# Patient Record
Sex: FEMALE | Race: WHITE | NOT HISPANIC OR LATINO | Employment: OTHER | URBAN - METROPOLITAN AREA
[De-identification: names, ages, dates, MRNs, and addresses within clinical notes are randomized per-mention and may not be internally consistent; named-entity substitution may affect disease eponyms.]

---

## 2023-09-04 PROBLEM — F41.9 ANXIETY: Status: ACTIVE | Noted: 2020-07-31

## 2023-09-04 PROBLEM — M54.32 LEFT SIDED SCIATICA: Status: ACTIVE | Noted: 2019-05-08

## 2023-09-04 PROBLEM — M48.02 FORAMINAL STENOSIS OF CERVICAL REGION: Status: ACTIVE | Noted: 2023-09-04

## 2023-09-04 PROBLEM — K80.10 CHOLELITHIASIS AND CHOLECYSTITIS WITHOUT OBSTRUCTION: Status: ACTIVE | Noted: 2023-09-04

## 2023-09-04 PROBLEM — R31.9 HEMATURIA: Status: ACTIVE | Noted: 2020-01-17

## 2023-09-04 PROBLEM — E66.9 OBESITY: Status: ACTIVE | Noted: 2019-07-23

## 2023-09-04 PROBLEM — R10.11 ABDOMINAL PAIN, RIGHT UPPER QUADRANT: Status: ACTIVE | Noted: 2019-11-22

## 2023-09-04 PROBLEM — R82.90 ABNORMAL URINALYSIS: Status: ACTIVE | Noted: 2020-11-24

## 2023-09-04 PROBLEM — K80.20 GALL STONE: Status: ACTIVE | Noted: 2019-11-27

## 2023-09-04 PROBLEM — E78.5 HLD (HYPERLIPIDEMIA): Status: ACTIVE | Noted: 2022-10-19

## 2023-09-04 PROBLEM — R82.90 MALODOROUS URINE: Status: ACTIVE | Noted: 2020-01-17

## 2023-09-04 PROBLEM — K52.9 GASTROENTERITIS: Status: ACTIVE | Noted: 2023-09-04

## 2023-09-04 PROBLEM — G56.20 CUBITAL TUNNEL SYNDROME: Status: ACTIVE | Noted: 2019-06-14

## 2023-09-04 PROBLEM — Z90.49 S/P CHOLECYSTECTOMY: Status: ACTIVE | Noted: 2020-01-17

## 2023-09-04 PROBLEM — R30.0 DYSURIA: Status: ACTIVE | Noted: 2018-05-07

## 2023-09-04 PROBLEM — E11.9 DIABETES MELLITUS (MULTI): Status: ACTIVE | Noted: 2020-04-24

## 2023-09-04 PROBLEM — G95.20 CERVICAL SPINAL CORD COMPRESSION (MULTI): Status: ACTIVE | Noted: 2023-09-04

## 2023-09-04 PROBLEM — R20.0 NUMBNESS: Status: ACTIVE | Noted: 2023-09-04

## 2023-09-04 PROBLEM — R47.81 SLURRED SPEECH: Status: ACTIVE | Noted: 2020-07-06

## 2023-09-04 PROBLEM — Z98.1 STATUS POST CERVICAL SPINAL FUSION: Status: ACTIVE | Noted: 2023-09-04

## 2023-09-04 RX ORDER — OMEPRAZOLE 20 MG/1
1 CAPSULE, DELAYED RELEASE ORAL DAILY
COMMUNITY
Start: 2023-02-28 | End: 2024-05-10 | Stop reason: SDUPTHER

## 2023-09-04 RX ORDER — MAGNESIUM GLUCONATE 27.5 (500)
TABLET ORAL
COMMUNITY
End: 2023-12-19

## 2023-09-04 RX ORDER — NAPROXEN SODIUM 220 MG
TABLET ORAL
COMMUNITY

## 2023-09-04 RX ORDER — MECLIZINE HCL 12.5 MG 12.5 MG/1
TABLET ORAL
COMMUNITY
Start: 2019-07-11 | End: 2023-12-19

## 2023-09-04 RX ORDER — IBUPROFEN 400 MG/1
TABLET ORAL
COMMUNITY
End: 2024-06-05

## 2023-09-04 RX ORDER — DEXTROMETHORPHAN HYDROBROMIDE, GUAIFENESIN 5; 100 MG/5ML; MG/5ML
1300 LIQUID ORAL
COMMUNITY
Start: 2023-02-28

## 2023-09-04 RX ORDER — METFORMIN HYDROCHLORIDE 500 MG/1
1 TABLET ORAL 2 TIMES DAILY
COMMUNITY
Start: 2023-02-28 | End: 2023-12-19

## 2023-09-04 RX ORDER — BLOOD-GLUCOSE METER
KIT MISCELLANEOUS DAILY
COMMUNITY
Start: 2019-03-22

## 2023-09-17 ENCOUNTER — HOSPITAL ENCOUNTER (OUTPATIENT)
Dept: DATA CONVERSION | Facility: HOSPITAL | Age: 73
Discharge: HOME | End: 2023-09-17
Payer: MEDICARE

## 2023-09-17 DIAGNOSIS — R53.83 OTHER FATIGUE: ICD-10-CM

## 2023-09-17 DIAGNOSIS — R42 DIZZINESS AND GIDDINESS: ICD-10-CM

## 2023-09-17 DIAGNOSIS — I10 ESSENTIAL (PRIMARY) HYPERTENSION: ICD-10-CM

## 2023-09-17 DIAGNOSIS — Z20.822 CONTACT WITH AND (SUSPECTED) EXPOSURE TO COVID-19: ICD-10-CM

## 2023-09-17 DIAGNOSIS — Z88.2 ALLERGY STATUS TO SULFONAMIDES: ICD-10-CM

## 2023-09-17 DIAGNOSIS — Z88.0 ALLERGY STATUS TO PENICILLIN: ICD-10-CM

## 2023-09-17 DIAGNOSIS — Z79.84 LONG TERM (CURRENT) USE OF ORAL HYPOGLYCEMIC DRUGS: ICD-10-CM

## 2023-09-17 DIAGNOSIS — R53.1 WEAKNESS: ICD-10-CM

## 2023-09-17 DIAGNOSIS — Z79.899 OTHER LONG TERM (CURRENT) DRUG THERAPY: ICD-10-CM

## 2023-09-17 LAB
ALBUMIN SERPL-MCNC: 4.3 GM/DL (ref 3.5–5)
ALBUMIN/GLOB SERPL: 1.6 RATIO (ref 1.5–3)
ALP BLD-CCNC: 118 U/L (ref 35–125)
ALT SERPL-CCNC: 13 U/L (ref 5–40)
AMPHETAMINES UR QL SCN>1000 NG/ML: NEGATIVE
ANION GAP SERPL CALCULATED.3IONS-SCNC: 12 MMOL/L (ref 0–19)
AST SERPL-CCNC: 17 U/L (ref 5–40)
BACTERIA SPEC CULT: NORMAL
BACTERIA UR QL AUTO: NEGATIVE
BARBITURATES UR QL SCN>300 NG/ML: NEGATIVE
BASOPHILS # BLD AUTO: 0.02 K/UL (ref 0–0.22)
BASOPHILS NFR BLD AUTO: 0.3 % (ref 0–1)
BENZODIAZ UR QL SCN>300 NG/ML: NEGATIVE
BILIRUB SERPL-MCNC: 0.4 MG/DL (ref 0.1–1.2)
BILIRUB UR QL STRIP.AUTO: NEGATIVE
BUN SERPL-MCNC: 20 MG/DL (ref 8–25)
BUN/CREAT SERPL: 22.2 RATIO (ref 8–21)
BZE UR QL SCN>300 NG/ML: NEGATIVE
CALCIUM SERPL-MCNC: 10.2 MG/DL (ref 8.5–10.4)
CANNABINOIDS UR QL SCN>50 NG/ML: NEGATIVE
CC # UR: NORMAL /UL
CHLORIDE SERPL-SCNC: 102 MMOL/L (ref 97–107)
CLARITY UR: CLEAR
CO2 SERPL-SCNC: 25 MMOL/L (ref 24–31)
COLOR UR: ABNORMAL
CREAT SERPL-MCNC: 0.9 MG/DL (ref 0.4–1.6)
DEPRECATED RDW RBC AUTO: 43 FL (ref 37–54)
DIFFERENTIAL METHOD BLD: ABNORMAL
DRUG SCREEN COMMENT UR-IMP: NORMAL
EOSINOPHIL # BLD AUTO: 0.1 K/UL (ref 0–0.45)
EOSINOPHIL NFR BLD: 1.7 % (ref 0–3)
ERYTHROCYTE [DISTWIDTH] IN BLOOD BY AUTOMATED COUNT: 12.8 % (ref 11.7–15)
FENTANYL+NORFENTANYL UR QL SCN: NEGATIVE
GFR SERPL CREATININE-BSD FRML MDRD: 67 ML/MIN/1.73 M2
GLOBULIN SER-MCNC: 2.7 G/DL (ref 1.9–3.7)
GLUCOSE SERPL-MCNC: 195 MG/DL (ref 65–99)
GLUCOSE UR STRIP.AUTO-MCNC: 300 MG/DL
HCT VFR BLD AUTO: 43.4 % (ref 36–44)
HGB BLD-MCNC: 15.2 GM/DL (ref 12–15)
HGB UR QL STRIP.AUTO: 1 /HPF (ref 0–3)
HGB UR QL: NEGATIVE
HYALINE CASTS UR QL AUTO: ABNORMAL /LPF
IMM GRANULOCYTES # BLD AUTO: 0.02 K/UL (ref 0–0.1)
KETONES UR QL STRIP.AUTO: NEGATIVE
LEUKOCYTE ESTERASE UR QL STRIP.AUTO: ABNORMAL
LYMPHOCYTES # BLD AUTO: 1.31 K/UL (ref 1.2–3.2)
LYMPHOCYTES NFR BLD MANUAL: 22.9 % (ref 20–40)
MCH RBC QN AUTO: 32 PG (ref 26–34)
MCHC RBC AUTO-ENTMCNC: 35 % (ref 31–37)
MCV RBC AUTO: 91.4 FL (ref 80–100)
METHADONE UR QL SCN>300 NG/ML: NEGATIVE
MICROSCOPIC (UA): ABNORMAL
MONOCYTES # BLD AUTO: 0.28 K/UL (ref 0–0.8)
MONOCYTES NFR BLD MANUAL: 4.9 % (ref 0–8)
NEUTROPHILS # BLD AUTO: 3.99 K/UL
NEUTROPHILS # BLD AUTO: 3.99 K/UL (ref 1.8–7.7)
NEUTROPHILS.IMMATURE NFR BLD: 0.3 % (ref 0–1)
NEUTS SEG NFR BLD: 69.9 % (ref 50–70)
NITRITE UR QL STRIP.AUTO: NEGATIVE
NOTE (COV): NORMAL
NRBC BLD-RTO: 0 /100 WBC
OPIATES UR QL SCN>300 NG/ML: NEGATIVE
OXYCODONE UR QL: NEGATIVE
PCP UR QL SCN>25 NG/ML: NEGATIVE
PH UR STRIP.AUTO: 7 [PH] (ref 4.6–8)
PLATELET # BLD AUTO: 167 K/UL (ref 150–450)
PMV BLD AUTO: 10.7 CU (ref 7–12.6)
POTASSIUM SERPL-SCNC: 4.7 MMOL/L (ref 3.4–5.1)
PROT SERPL-MCNC: 7 G/DL (ref 5.9–7.9)
PROT UR STRIP.AUTO-MCNC: NEGATIVE MG/DL
RBC # BLD AUTO: 4.75 M/UL (ref 4–4.9)
REPORT STATUS -LH SQ DATA CONVERSION: NORMAL
SARS-COV-2 RNA RESP QL NAA+PROBE: NEGATIVE
SERVICE CMNT-IMP: NORMAL
SODIUM SERPL-SCNC: 139 MMOL/L (ref 133–145)
SP GR UR STRIP.AUTO: 1.02 (ref 1–1.03)
SPECIMEN SOURCE (COV): NORMAL
SPECIMEN SOURCE: NORMAL
SQUAMOUS UR QL AUTO: ABNORMAL /HPF
URINE CULTURE: ABNORMAL
UROBILINOGEN UR QL STRIP.AUTO: NORMAL MG/DL (ref 0–1)
WBC # BLD AUTO: 5.7 K/UL (ref 4.5–11)
WBC #/AREA URNS AUTO: 2 /HPF (ref 0–3)

## 2023-10-03 ENCOUNTER — LAB (OUTPATIENT)
Dept: LAB | Facility: LAB | Age: 73
End: 2023-10-03
Payer: MEDICARE

## 2023-10-03 DIAGNOSIS — E78.5 HYPERLIPIDEMIA, UNSPECIFIED: Primary | ICD-10-CM

## 2023-10-03 DIAGNOSIS — I10 ESSENTIAL (PRIMARY) HYPERTENSION: ICD-10-CM

## 2023-10-03 DIAGNOSIS — E11.9 TYPE 2 DIABETES MELLITUS WITHOUT COMPLICATIONS (MULTI): ICD-10-CM

## 2023-10-03 DIAGNOSIS — E66.9 OBESITY, UNSPECIFIED: ICD-10-CM

## 2023-10-03 DIAGNOSIS — E78.00 PURE HYPERCHOLESTEROLEMIA, UNSPECIFIED: ICD-10-CM

## 2023-10-03 LAB
ALBUMIN SERPL-MCNC: 4.2 G/DL (ref 3.5–5)
ALP BLD-CCNC: 105 U/L (ref 35–125)
ALT SERPL-CCNC: 14 U/L (ref 5–40)
ANION GAP SERPL CALC-SCNC: 10 MMOL/L
APPEARANCE UR: ABNORMAL
AST SERPL-CCNC: 14 U/L (ref 5–40)
BASOPHILS # BLD AUTO: 0.01 X10*3/UL (ref 0–0.1)
BASOPHILS NFR BLD AUTO: 0.2 %
BILIRUB SERPL-MCNC: 0.3 MG/DL (ref 0.1–1.2)
BILIRUB UR STRIP.AUTO-MCNC: NEGATIVE MG/DL
BUN SERPL-MCNC: 15 MG/DL (ref 8–25)
CALCIUM SERPL-MCNC: 9.5 MG/DL (ref 8.5–10.4)
CHLORIDE SERPL-SCNC: 108 MMOL/L (ref 97–107)
CHOLEST SERPL-MCNC: 222 MG/DL (ref 133–200)
CHOLEST/HDLC SERPL: 3 {RATIO}
CO2 SERPL-SCNC: 23 MMOL/L (ref 24–31)
COLOR UR: ABNORMAL
CREAT SERPL-MCNC: 0.9 MG/DL (ref 0.4–1.6)
EOSINOPHIL # BLD AUTO: 0.13 X10*3/UL (ref 0–0.4)
EOSINOPHIL NFR BLD AUTO: 2.7 %
ERYTHROCYTE [DISTWIDTH] IN BLOOD BY AUTOMATED COUNT: 13.5 % (ref 11.5–14.5)
EST. AVERAGE GLUCOSE BLD GHB EST-MCNC: 157 MG/DL
GFR SERPL CREATININE-BSD FRML MDRD: 67 ML/MIN/1.73M*2
GLUCOSE SERPL-MCNC: 173 MG/DL (ref 65–99)
GLUCOSE UR STRIP.AUTO-MCNC: NORMAL MG/DL
HBA1C MFR BLD: 7.1 %
HCT VFR BLD AUTO: 40.4 % (ref 36–46)
HDLC SERPL-MCNC: 73 MG/DL
HGB BLD-MCNC: 13.5 G/DL (ref 12–16)
IMM GRANULOCYTES # BLD AUTO: 0.03 X10*3/UL (ref 0–0.5)
IMM GRANULOCYTES NFR BLD AUTO: 0.6 % (ref 0–0.9)
KETONES UR STRIP.AUTO-MCNC: NEGATIVE MG/DL
LDLC SERPL CALC-MCNC: 121 MG/DL (ref 65–130)
LEUKOCYTE ESTERASE UR QL STRIP.AUTO: ABNORMAL
LYMPHOCYTES # BLD AUTO: 1.44 X10*3/UL (ref 0.8–3)
LYMPHOCYTES NFR BLD AUTO: 29.5 %
MCH RBC QN AUTO: 31.4 PG (ref 26–34)
MCHC RBC AUTO-ENTMCNC: 33.4 G/DL (ref 32–36)
MCV RBC AUTO: 94 FL (ref 80–100)
MONOCYTES # BLD AUTO: 0.28 X10*3/UL (ref 0.05–0.8)
MONOCYTES NFR BLD AUTO: 5.7 %
NEUTROPHILS # BLD AUTO: 2.99 X10*3/UL (ref 1.6–5.5)
NEUTROPHILS NFR BLD AUTO: 61.3 %
NITRITE UR QL STRIP.AUTO: NEGATIVE
NRBC BLD-RTO: 0 /100 WBCS (ref 0–0)
PH UR STRIP.AUTO: 5 [PH]
PLATELET # BLD AUTO: 179 X10*3/UL (ref 150–450)
PMV BLD AUTO: 11.5 FL (ref 7.5–11.5)
POTASSIUM SERPL-SCNC: 4 MMOL/L (ref 3.4–5.1)
PROT SERPL-MCNC: 6.3 G/DL (ref 5.9–7.9)
PROT UR STRIP.AUTO-MCNC: NEGATIVE MG/DL
RBC # BLD AUTO: 4.3 X10*6/UL (ref 4–5.2)
RBC # UR STRIP.AUTO: ABNORMAL /UL
RBC #/AREA URNS AUTO: ABNORMAL /HPF
SODIUM SERPL-SCNC: 141 MMOL/L (ref 133–145)
SP GR UR STRIP.AUTO: 1.01
SQUAMOUS #/AREA URNS AUTO: ABNORMAL /HPF
TRIGL SERPL-MCNC: 141 MG/DL (ref 40–150)
TSH SERPL DL<=0.05 MIU/L-ACNC: 0.96 MIU/L (ref 0.27–4.2)
UROBILINOGEN UR STRIP.AUTO-MCNC: NORMAL MG/DL
WBC # BLD AUTO: 4.9 X10*3/UL (ref 4.4–11.3)
WBC #/AREA URNS AUTO: ABNORMAL /HPF

## 2023-10-03 PROCEDURE — 36415 COLL VENOUS BLD VENIPUNCTURE: CPT

## 2023-10-04 ENCOUNTER — ANCILLARY PROCEDURE (OUTPATIENT)
Dept: RADIOLOGY | Facility: CLINIC | Age: 73
End: 2023-10-04
Payer: MEDICARE

## 2023-10-04 DIAGNOSIS — Z12.31 ENCOUNTER FOR SCREENING MAMMOGRAM FOR MALIGNANT NEOPLASM OF BREAST: ICD-10-CM

## 2023-10-04 PROCEDURE — 77063 BREAST TOMOSYNTHESIS BI: CPT | Mod: 50

## 2023-10-04 PROCEDURE — 77067 SCR MAMMO BI INCL CAD: CPT | Mod: 50

## 2023-10-13 ENCOUNTER — APPOINTMENT (OUTPATIENT)
Dept: PRIMARY CARE | Facility: CLINIC | Age: 73
End: 2023-10-13
Payer: MEDICARE

## 2023-10-19 ENCOUNTER — OFFICE VISIT (OUTPATIENT)
Dept: PRIMARY CARE | Facility: CLINIC | Age: 73
End: 2023-10-19
Payer: MEDICARE

## 2023-10-19 VITALS
BODY MASS INDEX: 32.17 KG/M2 | HEART RATE: 73 BPM | DIASTOLIC BLOOD PRESSURE: 82 MMHG | SYSTOLIC BLOOD PRESSURE: 144 MMHG | HEIGHT: 62 IN | OXYGEN SATURATION: 98 % | WEIGHT: 174.8 LBS

## 2023-10-19 DIAGNOSIS — E11.9 TYPE 2 DIABETES MELLITUS WITHOUT COMPLICATION, WITHOUT LONG-TERM CURRENT USE OF INSULIN (MULTI): ICD-10-CM

## 2023-10-19 DIAGNOSIS — E78.5 HYPERLIPIDEMIA, UNSPECIFIED HYPERLIPIDEMIA TYPE: ICD-10-CM

## 2023-10-19 DIAGNOSIS — M85.859 OSTEOPENIA OF HIP, UNSPECIFIED LATERALITY: ICD-10-CM

## 2023-10-19 DIAGNOSIS — Z78.0 ASYMPTOMATIC MENOPAUSAL STATE: ICD-10-CM

## 2023-10-19 DIAGNOSIS — Z00.00 ROUTINE GENERAL MEDICAL EXAMINATION AT HEALTH CARE FACILITY: Primary | ICD-10-CM

## 2023-10-19 DIAGNOSIS — I10 BENIGN ESSENTIAL HTN: ICD-10-CM

## 2023-10-19 PROCEDURE — 3079F DIAST BP 80-89 MM HG: CPT | Performed by: STUDENT IN AN ORGANIZED HEALTH CARE EDUCATION/TRAINING PROGRAM

## 2023-10-19 PROCEDURE — 1159F MED LIST DOCD IN RCRD: CPT | Performed by: STUDENT IN AN ORGANIZED HEALTH CARE EDUCATION/TRAINING PROGRAM

## 2023-10-19 PROCEDURE — 3077F SYST BP >= 140 MM HG: CPT | Performed by: STUDENT IN AN ORGANIZED HEALTH CARE EDUCATION/TRAINING PROGRAM

## 2023-10-19 PROCEDURE — 1126F AMNT PAIN NOTED NONE PRSNT: CPT | Performed by: STUDENT IN AN ORGANIZED HEALTH CARE EDUCATION/TRAINING PROGRAM

## 2023-10-19 PROCEDURE — 1036F TOBACCO NON-USER: CPT | Performed by: STUDENT IN AN ORGANIZED HEALTH CARE EDUCATION/TRAINING PROGRAM

## 2023-10-19 PROCEDURE — 99213 OFFICE O/P EST LOW 20 MIN: CPT | Performed by: STUDENT IN AN ORGANIZED HEALTH CARE EDUCATION/TRAINING PROGRAM

## 2023-10-19 PROCEDURE — 1170F FXNL STATUS ASSESSED: CPT | Performed by: STUDENT IN AN ORGANIZED HEALTH CARE EDUCATION/TRAINING PROGRAM

## 2023-10-19 PROCEDURE — 1160F RVW MEDS BY RX/DR IN RCRD: CPT | Performed by: STUDENT IN AN ORGANIZED HEALTH CARE EDUCATION/TRAINING PROGRAM

## 2023-10-19 PROCEDURE — 3051F HG A1C>EQUAL 7.0%<8.0%: CPT | Performed by: STUDENT IN AN ORGANIZED HEALTH CARE EDUCATION/TRAINING PROGRAM

## 2023-10-19 PROCEDURE — G0439 PPPS, SUBSEQ VISIT: HCPCS | Performed by: STUDENT IN AN ORGANIZED HEALTH CARE EDUCATION/TRAINING PROGRAM

## 2023-10-19 PROCEDURE — 3049F LDL-C 100-129 MG/DL: CPT | Performed by: STUDENT IN AN ORGANIZED HEALTH CARE EDUCATION/TRAINING PROGRAM

## 2023-10-19 ASSESSMENT — ACTIVITIES OF DAILY LIVING (ADL)
DOING_HOUSEWORK: INDEPENDENT
GROCERY_SHOPPING: INDEPENDENT
TAKING_MEDICATION: INDEPENDENT
DRESSING: INDEPENDENT
BATHING: INDEPENDENT
MANAGING_FINANCES: INDEPENDENT

## 2023-10-19 ASSESSMENT — ENCOUNTER SYMPTOMS
DEPRESSION: 0
OCCASIONAL FEELINGS OF UNSTEADINESS: 0
LOSS OF SENSATION IN FEET: 0

## 2023-10-19 ASSESSMENT — PAIN SCALES - GENERAL: PAINLEVEL: 0-NO PAIN

## 2023-10-19 ASSESSMENT — PATIENT HEALTH QUESTIONNAIRE - PHQ9
1. LITTLE INTEREST OR PLEASURE IN DOING THINGS: NOT AT ALL
2. FEELING DOWN, DEPRESSED OR HOPELESS: NOT AT ALL
SUM OF ALL RESPONSES TO PHQ9 QUESTIONS 1 AND 2: 0

## 2023-10-19 NOTE — PROGRESS NOTES
Subjective   Reason for Visit: Beryl Sierra is an 73 y.o. female here for a Medicare Wellness visit.   PMHx, FMHx, SHx, and Social history reviewed and updated in chart. PHQ2 reviewed.  Advanced Care planning reviewed.  Self assessment of Health - good  Issues with ADLs - Bathing, dressing, walking - no  Issues with IADLs - managing finances, managing medications, shopping, basic housework - no  Issues with home safety - no      Past Medical, Surgical, and Family History reviewed and updated in chart.  Reviewed all medications by prescribing practitioner or clinical pharmacist (such as prescriptions, OTCs, herbal therapies and supplements) and documented in the medical record.    Presents for Annual Wellness Visit. PMHx, FMHx, SHx, and Social history reviewed and updated in chart. PHQ2 reviewed. Advanced Care planning reviewed.    CHRONIC ISSUES:  Anxiety - no meds  DM- prior Metformin 500mg BID. Stopped few mos ago  HTN - follows with Dr. Buck for med management d/t multiple allergies/intolerances. Home readings 130-low 140's/70's  HLD - no meds  GERD - omeprazole 20mg PRN    SOCIAL  Gardening, walking for exercise  Lives with   No EtOH, drugs, or tobacco    Health Maintenance:  Immunizations -    TDAP/TD - MEDICARE    Pneumococcal: declines all d/t allergy concerns    Shingles - declines d/t allergy concerns    COVID - x1- went to ED after for SOB, did not receive any treatment, just monitored. Has decided to forgo further injections    Influenza - declines d/t allergy concerns  Colonoscopy -  Dr. Alexander, ordered , not done  Women's Health:     Mammogram 10/2023    Dexa-   osteopenia, repeat 2 years    Pap - hyst age 30's      Diabetic review:  Last glucose on BMP - 173, 126, 148  Last A1c - 7.1, 6.9, 6.5, 7.1, 7.1  Current treatment:  - self discontinued Metformin 500mg BID  Home blood sugar readings: 130-160's fasting, 150-200's  Fastin's  No hypoglycemia, no hyperglycemia  Last eGFR  "- 67, 67, 77, 68, 65  Last Creatinine - 0.9, 0.9, 0.8, 0.9, 0.9  Last microalbumin - 12  Last eye exam - 3/2023. Dr. Rafael YANG -- upcoming appt 3/20/24  Last foot exam - declines today    Lung ca screen - NA never smoker  HLD - ASCVD 44.5%, trialed and failed statins, not willing to try any additional therapies    Patient Care Team:  Rox Govea MD as PCP - General (Family Medicine)  Jenniffer Cook PA-C as Primary Care Provider     Review of Systems   Constitutional:  Negative for chills and fever.   HENT:  Negative for trouble swallowing.    Eyes:  Negative for visual disturbance.   Respiratory:  Negative for cough, chest tightness, shortness of breath and wheezing.    Cardiovascular:  Negative for chest pain and palpitations.   Gastrointestinal:  Negative for abdominal pain, blood in stool, constipation and diarrhea.   Genitourinary:  Negative for difficulty urinating, vaginal bleeding, vaginal discharge and vaginal pain.   Neurological:  Negative for dizziness, weakness, light-headedness and headaches.       Objective   Vitals:  /82   Pulse 73   Ht 1.58 m (5' 2.21\")   Wt 79.3 kg (174 lb 12.8 oz)   SpO2 98%   BMI 31.76 kg/m²       Physical Exam  Constitutional:       Appearance: Normal appearance.   HENT:      Head: Normocephalic and atraumatic.      Right Ear: Tympanic membrane, ear canal and external ear normal.      Left Ear: Tympanic membrane, ear canal and external ear normal.      Mouth/Throat:      Mouth: Mucous membranes are moist.      Pharynx: Oropharynx is clear.   Eyes:      Extraocular Movements: Extraocular movements intact.      Conjunctiva/sclera: Conjunctivae normal.      Pupils: Pupils are equal, round, and reactive to light.   Cardiovascular:      Rate and Rhythm: Normal rate and regular rhythm.      Pulses: Normal pulses.      Heart sounds: Normal heart sounds. No murmur heard.  Pulmonary:      Effort: Pulmonary effort is normal.      Breath sounds: Normal breath sounds. No " wheezing, rhonchi or rales.   Abdominal:      General: Abdomen is flat.      Palpations: Abdomen is soft.      Tenderness: There is no abdominal tenderness.   Musculoskeletal:         General: Normal range of motion.      Cervical back: Neck supple.   Skin:     General: Skin is warm and dry.   Neurological:      Mental Status: She is alert.   Psychiatric:         Mood and Affect: Mood normal.         Behavior: Behavior normal.         Assessment/Plan   1. Routine general medical examination at health care facility  Normal exam  No acute issues  HM reviewed and discussed  Labs reviewed    2. Type 2 diabetes mellitus without complication, without long-term current use of insulin (CMS/[facility])  A1c reviewed. Diet and exercise regimen recommendations discussed. Follow up 3 mos.     3. Asymptomatic menopausal state  - XR DEXA bone density; Future    4. Benign essential HTN  Well controlled on current meds, no indication to change any meds at this time  Stable renal function  Repeat in 6mo      5. Hyperlipidemia, unspecified hyperlipidemia type  Lipid panel reviewed. Low fat diet. Regular exercise.      6. Osteopenia of hip, unspecified laterality  Update dexa scan

## 2023-10-26 ASSESSMENT — ENCOUNTER SYMPTOMS
CHEST TIGHTNESS: 0
ABDOMINAL PAIN: 0
COUGH: 0
LIGHT-HEADEDNESS: 0
PALPITATIONS: 0
WHEEZING: 0
SHORTNESS OF BREATH: 0
TROUBLE SWALLOWING: 0
BLOOD IN STOOL: 0
FEVER: 0
WEAKNESS: 0
CONSTIPATION: 0
DIZZINESS: 0
CHILLS: 0
DIARRHEA: 0
DIFFICULTY URINATING: 0
HEADACHES: 0

## 2023-12-19 ENCOUNTER — OFFICE VISIT (OUTPATIENT)
Dept: CARDIOLOGY | Facility: CLINIC | Age: 73
End: 2023-12-19
Payer: MEDICARE

## 2023-12-19 VITALS
OXYGEN SATURATION: 100 % | WEIGHT: 178 LBS | SYSTOLIC BLOOD PRESSURE: 168 MMHG | BODY MASS INDEX: 32.34 KG/M2 | HEART RATE: 81 BPM | DIASTOLIC BLOOD PRESSURE: 88 MMHG

## 2023-12-19 DIAGNOSIS — I10 PRIMARY HYPERTENSION: Primary | ICD-10-CM

## 2023-12-19 PROCEDURE — 3079F DIAST BP 80-89 MM HG: CPT | Performed by: INTERNAL MEDICINE

## 2023-12-19 PROCEDURE — 1126F AMNT PAIN NOTED NONE PRSNT: CPT | Performed by: INTERNAL MEDICINE

## 2023-12-19 PROCEDURE — 3077F SYST BP >= 140 MM HG: CPT | Performed by: INTERNAL MEDICINE

## 2023-12-19 PROCEDURE — 1160F RVW MEDS BY RX/DR IN RCRD: CPT | Performed by: INTERNAL MEDICINE

## 2023-12-19 PROCEDURE — 3049F LDL-C 100-129 MG/DL: CPT | Performed by: INTERNAL MEDICINE

## 2023-12-19 PROCEDURE — 1159F MED LIST DOCD IN RCRD: CPT | Performed by: INTERNAL MEDICINE

## 2023-12-19 PROCEDURE — 1036F TOBACCO NON-USER: CPT | Performed by: INTERNAL MEDICINE

## 2023-12-19 PROCEDURE — 3051F HG A1C>EQUAL 7.0%<8.0%: CPT | Performed by: INTERNAL MEDICINE

## 2023-12-19 PROCEDURE — 99213 OFFICE O/P EST LOW 20 MIN: CPT | Performed by: INTERNAL MEDICINE

## 2023-12-19 ASSESSMENT — ENCOUNTER SYMPTOMS
COUGH: 0
BLURRED VISION: 0
PALPITATIONS: 0
ABDOMINAL PAIN: 0
DYSPNEA ON EXERTION: 0
PARESTHESIAS: 0
DYSURIA: 0
NUMBNESS: 0
HEMATURIA: 0
SHORTNESS OF BREATH: 0

## 2023-12-19 ASSESSMENT — PAIN SCALES - GENERAL: PAINLEVEL: 0-NO PAIN

## 2023-12-19 NOTE — ASSESSMENT & PLAN NOTE
Patient physically feels well.  Reviewed her home blood pressure log with modest elevations in blood pressures not as high as mine today.  Problem has been finding an agent that would not produce adverse effects.  I do not see any trials of an angiotensin receptor blocker agent.  Patient is somewhat hesitant at this time as there is lots of social stressors at home.  Will thus have patient continue to keep a home blood pressure log and we will review on return visit.  Monitor blood open consider a trial of an agent such as losartan.

## 2023-12-19 NOTE — PROGRESS NOTES
Subjective   Beryl Sierra is a 73 y.o. female.    Chief Complaint:  6 month f/u    HPI   with illnesses,  CABG and skin cancer, so lots of stress.  Physically she feels well.  Review of Systems   Constitutional: Negative for malaise/fatigue.   HENT:  Negative for congestion.    Eyes:  Negative for blurred vision.   Cardiovascular:  Negative for chest pain, dyspnea on exertion and palpitations.   Respiratory:  Negative for cough and shortness of breath.    Musculoskeletal:  Negative for joint pain.   Gastrointestinal:  Negative for abdominal pain.   Genitourinary:  Negative for dysuria and hematuria.   Neurological:  Negative for numbness and paresthesias.       Objective   Constitutional:       Appearance: Not in distress.   Eyes:      Conjunctiva/sclera: Conjunctivae normal.   Neck:      Vascular: JVD normal.   Pulmonary:      Breath sounds: Normal breath sounds. No wheezing. No rhonchi. No rales.   Cardiovascular:      Normal rate. Regular rhythm.      Murmurs: There is no murmur.      No gallop.  No click. No rub.   Abdominal:      Palpations: Abdomen is soft.   Neurological:      General: No focal deficit present.      Mental Status: Alert.         Lab Review:       Assessment/Plan   The encounter diagnosis was Primary hypertension.    Primary hypertension  Patient physically feels well.  Reviewed her home blood pressure log with modest elevations in blood pressures not as high as mine today.  Problem has been finding an agent that would not produce adverse effects.  I do not see any trials of an angiotensin receptor blocker agent.  Patient is somewhat hesitant at this time as there is lots of social stressors at home.  Will thus have patient continue to keep a home blood pressure log and we will review on return visit.  Monitor blood open consider a trial of an agent such as losartan.

## 2024-01-09 ENCOUNTER — APPOINTMENT (OUTPATIENT)
Dept: RADIOLOGY | Facility: CLINIC | Age: 74
End: 2024-01-09
Payer: MEDICARE

## 2024-01-09 ENCOUNTER — TELEPHONE (OUTPATIENT)
Dept: PRIMARY CARE | Facility: CLINIC | Age: 74
End: 2024-01-09

## 2024-01-09 DIAGNOSIS — E11.9 TYPE 2 DIABETES MELLITUS WITHOUT COMPLICATION, WITHOUT LONG-TERM CURRENT USE OF INSULIN (MULTI): ICD-10-CM

## 2024-02-02 ENCOUNTER — APPOINTMENT (OUTPATIENT)
Dept: RADIOLOGY | Facility: CLINIC | Age: 74
End: 2024-02-02
Payer: MEDICARE

## 2024-02-02 ENCOUNTER — OFFICE VISIT (OUTPATIENT)
Dept: PRIMARY CARE | Facility: CLINIC | Age: 74
End: 2024-02-02
Payer: MEDICARE

## 2024-02-02 VITALS
BODY MASS INDEX: 31.83 KG/M2 | WEIGHT: 175.2 LBS | HEART RATE: 101 BPM | SYSTOLIC BLOOD PRESSURE: 132 MMHG | DIASTOLIC BLOOD PRESSURE: 82 MMHG | TEMPERATURE: 97.8 F | OXYGEN SATURATION: 98 %

## 2024-02-02 DIAGNOSIS — H10.13 ALLERGIC CONJUNCTIVITIS OF BOTH EYES: Primary | ICD-10-CM

## 2024-02-02 PROCEDURE — 3079F DIAST BP 80-89 MM HG: CPT | Performed by: STUDENT IN AN ORGANIZED HEALTH CARE EDUCATION/TRAINING PROGRAM

## 2024-02-02 PROCEDURE — 1036F TOBACCO NON-USER: CPT | Performed by: STUDENT IN AN ORGANIZED HEALTH CARE EDUCATION/TRAINING PROGRAM

## 2024-02-02 PROCEDURE — 3075F SYST BP GE 130 - 139MM HG: CPT | Performed by: STUDENT IN AN ORGANIZED HEALTH CARE EDUCATION/TRAINING PROGRAM

## 2024-02-02 PROCEDURE — 99213 OFFICE O/P EST LOW 20 MIN: CPT | Performed by: STUDENT IN AN ORGANIZED HEALTH CARE EDUCATION/TRAINING PROGRAM

## 2024-02-02 PROCEDURE — 1159F MED LIST DOCD IN RCRD: CPT | Performed by: STUDENT IN AN ORGANIZED HEALTH CARE EDUCATION/TRAINING PROGRAM

## 2024-02-02 PROCEDURE — 3051F HG A1C>EQUAL 7.0%<8.0%: CPT | Performed by: STUDENT IN AN ORGANIZED HEALTH CARE EDUCATION/TRAINING PROGRAM

## 2024-02-02 PROCEDURE — 3062F POS MACROALBUMINURIA REV: CPT | Performed by: STUDENT IN AN ORGANIZED HEALTH CARE EDUCATION/TRAINING PROGRAM

## 2024-02-02 PROCEDURE — 1125F AMNT PAIN NOTED PAIN PRSNT: CPT | Performed by: STUDENT IN AN ORGANIZED HEALTH CARE EDUCATION/TRAINING PROGRAM

## 2024-02-02 RX ORDER — PREDNISOLONE ACETATE 10 MG/ML
SUSPENSION/ DROPS OPHTHALMIC
COMMUNITY
Start: 2024-01-30 | End: 2024-06-05

## 2024-02-02 RX ORDER — CIPROFLOXACIN HYDROCHLORIDE 3 MG/ML
SOLUTION/ DROPS OPHTHALMIC
COMMUNITY
Start: 2024-01-30 | End: 2024-02-12 | Stop reason: ALTCHOICE

## 2024-02-02 ASSESSMENT — PATIENT HEALTH QUESTIONNAIRE - PHQ9
SUM OF ALL RESPONSES TO PHQ9 QUESTIONS 1 AND 2: 0
1. LITTLE INTEREST OR PLEASURE IN DOING THINGS: NOT AT ALL
2. FEELING DOWN, DEPRESSED OR HOPELESS: NOT AT ALL

## 2024-02-02 ASSESSMENT — PAIN SCALES - GENERAL: PAINLEVEL: 4

## 2024-02-02 NOTE — PROGRESS NOTES
GENERAL PROGRESS NOTE    Chief Complaint   Patient presents with    eyes red      Was given eye drops-Cipro/Prednisone     Sore Throat     Started as a result of using eye drops        HPI  Beryl Sierra is a 73 y.o. female that presents today for eye redness and irritation. Onset was earlier this week on Monday, 4 days ago,. Started with watery eye on the Rt and then began feeling irritated. Lt eye also become affected. She saw her ophthalmologist following day and was told related to her allergies. She was started on cipro/prednisone eye drops by Dr. Hall. Saw him on Tuesday. When she picked up the prescription from the pharmacy she was told she could not use the eye drops due to documented allergy. She is/was not aware of allergy but does have a number of drug allergies on her list including levofloxacin. She did not start the eye drops and as far as she is aware Dr. Hall was not notified. The eye drops were still dispensed to her. Has been using mostly Refresh tears and another ointment she had from a prior prescription from ophthalmology. Used the cipro/pred for a few days initially and then stopped because she developed a scratchhy throat and congestion and felt they were causing that due to her allergy. Currently her eyes both feel irritated, watery. No discharge or pain. Vision is normal.     Vital signs   /82   Pulse 101   Temp 36.6 °C (97.8 °F)   Wt 79.5 kg (175 lb 3.2 oz)   SpO2 98%   BMI 31.83 kg/m²      Current Outpatient Medications   Medication Sig Dispense Refill    acetaminophen (Tylenol Arthritis Pain) 650 mg ER tablet Take 2 tablets (1,300 mg) by mouth.      FreeStyle Lite Strips strip once daily.      ibuprofen 400 mg tablet 2 tablet with food or milk as needed Orally once daily      ciprofloxacin (Ciloxan) 0.3 % ophthalmic solution INSTILL ONE DROP INTO LEFT EYE TWICE  DAILY      naproxen sodium (Aleve) 220 mg tablet 1 tablet with food or milk as needed Orally every 12 hrs      omeprazole (PriLOSEC) 20 mg DR capsule Take 1 capsule (20 mg) by mouth once daily. As needed      prednisoLONE acetate (Pred-Forte) 1 % ophthalmic suspension INSTILL ONE DROP INTO LEFT EYE FOUR TIMES A DAY       No current facility-administered medications for this visit.       Review of Systems   All other systems have been reviewed and are negative except as noted in the HPI.       Physical Exam  Constitutional:       Appearance: Normal appearance.   HENT:      Head: Normocephalic and atraumatic.      Right Ear: Tympanic membrane, ear canal and external ear normal.      Left Ear: Tympanic membrane, ear canal and external ear normal.      Nose: No congestion or rhinorrhea.      Mouth/Throat:      Mouth: Mucous membranes are moist.      Pharynx: Oropharynx is clear. No oropharyngeal exudate or posterior oropharyngeal erythema.   Eyes:      General:         Right eye: No discharge.         Left eye: No discharge.      Extraocular Movements: Extraocular movements intact.      Conjunctiva/sclera:      Right eye: Right conjunctiva is injected.      Left eye: Left conjunctiva is injected.      Pupils: Pupils are equal, round, and reactive to light.   Neurological:      Mental Status: She is alert.         Assessment/Plan   1. Allergic conjunctivitis of both eyes  Discussed her scratchy throat and congestion are more likely related to allergies than allergic reaction to the eye drops but given her concern and hx of allergic reaction and several other documented drug allergies will remain off of antibiotic eye drop. She will continue lubricating eye drops. Discussed adding antihistamine eye drop given her symptoms appear to be allergy related. She will follow back with her ophthalmologist as scheduled. Return precautions reviewed.         Rox Govea MD  02/02/24  1:04 PM

## 2024-02-07 ENCOUNTER — LAB (OUTPATIENT)
Dept: LAB | Facility: LAB | Age: 74
End: 2024-02-07
Payer: MEDICARE

## 2024-02-07 ENCOUNTER — HOSPITAL ENCOUNTER (OUTPATIENT)
Dept: RADIOLOGY | Facility: CLINIC | Age: 74
Discharge: HOME | End: 2024-02-07
Payer: MEDICARE

## 2024-02-07 DIAGNOSIS — E11.9 TYPE 2 DIABETES MELLITUS WITHOUT COMPLICATION, WITHOUT LONG-TERM CURRENT USE OF INSULIN (MULTI): ICD-10-CM

## 2024-02-07 DIAGNOSIS — Z78.0 ASYMPTOMATIC MENOPAUSAL STATE: ICD-10-CM

## 2024-02-07 LAB
ALBUMIN SERPL-MCNC: 4.2 G/DL (ref 3.5–5)
ALP BLD-CCNC: 127 U/L (ref 35–125)
ALT SERPL-CCNC: 10 U/L (ref 5–40)
ANION GAP SERPL CALC-SCNC: 14 MMOL/L
AST SERPL-CCNC: 12 U/L (ref 5–40)
BILIRUB SERPL-MCNC: 0.3 MG/DL (ref 0.1–1.2)
BUN SERPL-MCNC: 16 MG/DL (ref 8–25)
CALCIUM SERPL-MCNC: 9.6 MG/DL (ref 8.5–10.4)
CHLORIDE SERPL-SCNC: 107 MMOL/L (ref 97–107)
CO2 SERPL-SCNC: 24 MMOL/L (ref 24–31)
CREAT SERPL-MCNC: 1 MG/DL (ref 0.4–1.6)
CREAT UR-MCNC: 188.2 MG/DL
EGFRCR SERPLBLD CKD-EPI 2021: 59 ML/MIN/1.73M*2
EST. AVERAGE GLUCOSE BLD GHB EST-MCNC: 177 MG/DL
GLUCOSE SERPL-MCNC: 220 MG/DL (ref 65–99)
HBA1C MFR BLD: 7.8 %
MICROALBUMIN UR-MCNC: <12 MG/L (ref 0–23)
MICROALBUMIN/CREAT UR: NORMAL MG/G{CREAT}
POTASSIUM SERPL-SCNC: 4.1 MMOL/L (ref 3.4–5.1)
PROT SERPL-MCNC: 6.6 G/DL (ref 5.9–7.9)
SODIUM SERPL-SCNC: 145 MMOL/L (ref 133–145)

## 2024-02-07 PROCEDURE — 77085 DXA BONE DENSITY AXL VRT FX: CPT

## 2024-02-07 PROCEDURE — 82570 ASSAY OF URINE CREATININE: CPT

## 2024-02-07 PROCEDURE — 83036 HEMOGLOBIN GLYCOSYLATED A1C: CPT

## 2024-02-07 PROCEDURE — 82043 UR ALBUMIN QUANTITATIVE: CPT

## 2024-02-07 PROCEDURE — 36415 COLL VENOUS BLD VENIPUNCTURE: CPT

## 2024-02-07 PROCEDURE — 80053 COMPREHEN METABOLIC PANEL: CPT

## 2024-02-12 ENCOUNTER — TELEPHONE (OUTPATIENT)
Dept: PRIMARY CARE | Facility: CLINIC | Age: 74
End: 2024-02-12

## 2024-02-12 ENCOUNTER — OFFICE VISIT (OUTPATIENT)
Dept: PRIMARY CARE | Facility: CLINIC | Age: 74
End: 2024-02-12
Payer: MEDICARE

## 2024-02-12 VITALS
OXYGEN SATURATION: 99 % | HEART RATE: 91 BPM | DIASTOLIC BLOOD PRESSURE: 80 MMHG | SYSTOLIC BLOOD PRESSURE: 130 MMHG | WEIGHT: 177 LBS | BODY MASS INDEX: 32.16 KG/M2

## 2024-02-12 DIAGNOSIS — E11.9 TYPE 2 DIABETES MELLITUS WITHOUT COMPLICATION, WITHOUT LONG-TERM CURRENT USE OF INSULIN (MULTI): Primary | ICD-10-CM

## 2024-02-12 DIAGNOSIS — M85.859 OSTEOPENIA OF NECK OF FEMUR, UNSPECIFIED LATERALITY: ICD-10-CM

## 2024-02-12 DIAGNOSIS — N18.31 STAGE 3A CHRONIC KIDNEY DISEASE (MULTI): ICD-10-CM

## 2024-02-12 DIAGNOSIS — E11.9 TYPE 2 DIABETES MELLITUS WITHOUT COMPLICATION, WITHOUT LONG-TERM CURRENT USE OF INSULIN (MULTI): ICD-10-CM

## 2024-02-12 DIAGNOSIS — E78.5 HYPERLIPIDEMIA, UNSPECIFIED HYPERLIPIDEMIA TYPE: ICD-10-CM

## 2024-02-12 DIAGNOSIS — I10 PRIMARY HYPERTENSION: ICD-10-CM

## 2024-02-12 PROBLEM — R47.81 SLURRED SPEECH: Status: RESOLVED | Noted: 2020-07-06 | Resolved: 2024-02-12

## 2024-02-12 PROBLEM — K80.10 CHOLELITHIASIS AND CHOLECYSTITIS WITHOUT OBSTRUCTION: Status: RESOLVED | Noted: 2023-09-04 | Resolved: 2024-02-12

## 2024-02-12 PROBLEM — R10.11 ABDOMINAL PAIN, RIGHT UPPER QUADRANT: Status: RESOLVED | Noted: 2019-11-22 | Resolved: 2024-02-12

## 2024-02-12 PROBLEM — K80.20 GALL STONE: Status: RESOLVED | Noted: 2019-11-27 | Resolved: 2024-02-12

## 2024-02-12 PROBLEM — R20.0 NUMBNESS: Status: RESOLVED | Noted: 2023-09-04 | Resolved: 2024-02-12

## 2024-02-12 PROBLEM — R30.0 DYSURIA: Status: RESOLVED | Noted: 2018-05-07 | Resolved: 2024-02-12

## 2024-02-12 PROBLEM — R82.90 ABNORMAL URINALYSIS: Status: RESOLVED | Noted: 2020-11-24 | Resolved: 2024-02-12

## 2024-02-12 PROBLEM — R82.90 MALODOROUS URINE: Status: RESOLVED | Noted: 2020-01-17 | Resolved: 2024-02-12

## 2024-02-12 PROCEDURE — 3051F HG A1C>EQUAL 7.0%<8.0%: CPT | Performed by: STUDENT IN AN ORGANIZED HEALTH CARE EDUCATION/TRAINING PROGRAM

## 2024-02-12 PROCEDURE — 3062F POS MACROALBUMINURIA REV: CPT | Performed by: STUDENT IN AN ORGANIZED HEALTH CARE EDUCATION/TRAINING PROGRAM

## 2024-02-12 PROCEDURE — 1159F MED LIST DOCD IN RCRD: CPT | Performed by: STUDENT IN AN ORGANIZED HEALTH CARE EDUCATION/TRAINING PROGRAM

## 2024-02-12 PROCEDURE — 3079F DIAST BP 80-89 MM HG: CPT | Performed by: STUDENT IN AN ORGANIZED HEALTH CARE EDUCATION/TRAINING PROGRAM

## 2024-02-12 PROCEDURE — 1036F TOBACCO NON-USER: CPT | Performed by: STUDENT IN AN ORGANIZED HEALTH CARE EDUCATION/TRAINING PROGRAM

## 2024-02-12 PROCEDURE — 1160F RVW MEDS BY RX/DR IN RCRD: CPT | Performed by: STUDENT IN AN ORGANIZED HEALTH CARE EDUCATION/TRAINING PROGRAM

## 2024-02-12 PROCEDURE — 99214 OFFICE O/P EST MOD 30 MIN: CPT | Performed by: STUDENT IN AN ORGANIZED HEALTH CARE EDUCATION/TRAINING PROGRAM

## 2024-02-12 PROCEDURE — 3075F SYST BP GE 130 - 139MM HG: CPT | Performed by: STUDENT IN AN ORGANIZED HEALTH CARE EDUCATION/TRAINING PROGRAM

## 2024-02-12 PROCEDURE — 1126F AMNT PAIN NOTED NONE PRSNT: CPT | Performed by: STUDENT IN AN ORGANIZED HEALTH CARE EDUCATION/TRAINING PROGRAM

## 2024-02-12 ASSESSMENT — PAIN SCALES - GENERAL: PAINLEVEL: 0-NO PAIN

## 2024-02-12 NOTE — PROGRESS NOTES
Subjective   Beryl Sierra is a 73 y.o. female who presents for chronic disease management. CPE due 10/2024     INTERVAL HX   Allergic conjunctivitis - has been using prednisone drops prescribed, using visine allergy as well. Not using Cipro.     CHRONIC ISSUES:  Anxiety - no meds  DM- no medications, previously self discontinued metformin. A1c 7.8  HTN - follows with Dr. Buck for med management d/t multiple allergies/intolerances. Home readings 130-low 140's/70's  HLD - no meds  GERD - omeprazole 20mg PRN     #T2DM  Current medication regimen:  None  - previously self discontinued Metformin 500mg BID  Blood sugars at home averaging:  - Fasting and non-fasting: 160-180's  CGM? no  Hypoglycemic episodes no  Last A1C :  7.8, 7.1, 6.9, 6.5, 7.1  Last eGFR - 59, 67, 67, 77, 68, 65  Last Creatinine - 1.0, 0.9, 0.9, 0.8, 0.9, 0.9  Last microalbumin - <12 2/2024  Up to date on yearly eye exams yes under Dr. Hall  Pneumococcal - no, declined previously    Dexa:  Spine T score: 1.6, total femur T-score: -0.4,  femur neck T-score: -1.9.   Fracture risk total 18%, hip 4.0%  -Not on calcium supplement, not taking Vitamin D -- says allergic to Vit D      LAB REVIEW   Hemoglobin A1C (%)   Date Value   02/07/2024 7.8 (H)   10/03/2023 7.1 (H)   02/17/2023 6.9 (H)   08/16/2022 6.5 (H)   01/24/2022 7.1 (H)     Glucose   Date Value   02/07/2024 220 mg/dL (H)   10/03/2023 173 mg/dL (H)   09/17/2023 195 MG/DL (H)   02/17/2023 126 MG/DL (H)   08/16/2022 148 MG/DL (H)     Creatinine   Date Value   02/07/2024 1.00 mg/dL   10/03/2023 0.90 mg/dL   09/17/2023 0.9 MG/DL   02/17/2023 0.9 MG/DL   08/16/2022 0.8 MG/DL     eGFR (mL/min/1.73m*2)   Date Value   02/07/2024 59 (L)   10/03/2023 67     ESTIMATED GFR (mL/min/1.73 m2)   Date Value   09/17/2023 67   02/17/2023 67   08/16/2022 77     Lab Results   Component Value Date    ALBUR 12 02/17/2023    CREATININE 1.00 02/07/2024     Lab Results   Component Value Date    CHOL 222 (H) 10/03/2023     CHOL 206 (H) 08/16/2022    CHOL 217 (H) 07/30/2021     Lab Results   Component Value Date    HDL 73.0 10/03/2023    HDL 69 08/16/2022    HDL 65 07/30/2021     Lab Results   Component Value Date    LDLCALC 121 10/03/2023    LDLCALC 113 08/16/2022    LDLCALC 127 07/30/2021     Lab Results   Component Value Date    TRIG 141 10/03/2023    TRIG 121 08/16/2022    TRIG 124 07/30/2021       ROS otherwise negative aside from what was mentioned above in HPI.      Objective   /80   Pulse 91   Wt 80.3 kg (177 lb)   SpO2 99%   BMI 32.16 kg/m²     Physical Exam  Vitals and nursing note reviewed.   Constitutional:       General: She is not in acute distress.     Appearance: She is not ill-appearing.   Cardiovascular:      Rate and Rhythm: Normal rate and regular rhythm.      Heart sounds: Normal heart sounds. No murmur heard.  Pulmonary:      Effort: Pulmonary effort is normal. No respiratory distress.      Breath sounds: Normal breath sounds. No wheezing, rhonchi or rales.   Musculoskeletal:      Right lower leg: No edema.      Left lower leg: No edema.         Assessment/Plan     1. Type 2 diabetes mellitus without complication, without long-term current use of insulin (CMS/[facility])  Interval increase in A1c from 7.1 to 7.8. Does admit to poor diet and lack of exercise. She is hesitant to start medication at this time given many drug intolerances/allergies. Will increase diet and exercise efforts, repeat 3-4 mos and if no improvement will need to revisit medication. Healthy diet discussed.  Limit simple sugars. Goals reviewed.  Follow up 3 mos.      2. Hyperlipidemia, unspecified hyperlipidemia type  No medications. Diet and exercise recommendations reviewed.    3. Primary hypertension  Well controlled off meds. Continue home monitoring. Will continue to follow with cardiology.       4. Stage 3a chronic kidney disease (CMS/[facility])  Discussed adequate hydration. Limited use of NSAIDs. Avoid nephrotoxic agents.   - Vitamin D  25-Hydroxy,Total (for eval of Vitamin D levels); Future    5. Osteopenia of neck of femur, unspecified laterality  Discussed increased hip fracture risk and option to start treatment vs observe. She declines to treat at this time. Will consider retrial vitamin D supplement. Will check level in meantime. Dietary calcium intake discussed.

## 2024-02-12 NOTE — PATIENT INSTRUCTIONS
Recommended calcium intake 1000mg daily  Milk (skim, 2%, or whole; 8 oz [240 mL]):  300mg  Yogurt (6 oz [168 g]):  250mg  Orange juice (with calcium; 8 oz [240 mL]):  300mg  Tofu with calcium (0.5 cup [113 g]):  435mg  Cheese (1 oz [28 g]): 195 to 335mg (hard cheese = higher calcium)  Cottage cheese (0.5 cup [113 g]): 130mg  Ice cream or frozen yogurt (0.5 cup [113 g]): 100mg  Non-dairy milks (soy, oat, almond; 8 oz [240 mL]):  300 to 450mg  Beans (0.5 cup cooked [113 g]):  60 to 80mg  Dark, leafy green vegetables (0.5 cup cooked [113 g]):  50 to 135mg  Almonds (24 whole):  70mg  Orange (1 medium): 60mg    Vitamin D - 1000 international units  daily

## 2024-05-10 ENCOUNTER — TELEPHONE (OUTPATIENT)
Dept: PRIMARY CARE | Facility: CLINIC | Age: 74
End: 2024-05-10

## 2024-05-10 DIAGNOSIS — K52.9 GASTROENTERITIS: Primary | ICD-10-CM

## 2024-05-10 RX ORDER — OMEPRAZOLE 20 MG/1
20 CAPSULE, DELAYED RELEASE ORAL DAILY
Qty: 90 CAPSULE | Refills: 0 | Status: SHIPPED | OUTPATIENT
Start: 2024-05-10

## 2024-05-10 NOTE — TELEPHONE ENCOUNTER
Patient called RX Line:    omeprazole (PriLOSEC) 20mg DR capsule     [facility] ([place])    Patient phone number: [phone]

## 2024-06-03 ENCOUNTER — LAB (OUTPATIENT)
Dept: LAB | Facility: LAB | Age: 74
End: 2024-06-03
Payer: COMMERCIAL

## 2024-06-03 DIAGNOSIS — N18.31 STAGE 3A CHRONIC KIDNEY DISEASE (MULTI): ICD-10-CM

## 2024-06-03 DIAGNOSIS — E11.9 TYPE 2 DIABETES MELLITUS WITHOUT COMPLICATION, WITHOUT LONG-TERM CURRENT USE OF INSULIN (MULTI): ICD-10-CM

## 2024-06-03 LAB
25(OH)D3 SERPL-MCNC: 17 NG/ML (ref 31–100)
ALBUMIN SERPL-MCNC: 4.2 G/DL (ref 3.5–5)
ALP BLD-CCNC: 111 U/L (ref 35–125)
ALT SERPL-CCNC: 13 U/L (ref 5–40)
ANION GAP SERPL CALC-SCNC: 13 MMOL/L
APPEARANCE UR: ABNORMAL
AST SERPL-CCNC: 15 U/L (ref 5–40)
BACTERIA #/AREA URNS AUTO: ABNORMAL /HPF
BILIRUB SERPL-MCNC: 0.3 MG/DL (ref 0.1–1.2)
BILIRUB UR STRIP.AUTO-MCNC: NEGATIVE MG/DL
BUN SERPL-MCNC: 17 MG/DL (ref 8–25)
CALCIUM SERPL-MCNC: 9.5 MG/DL (ref 8.5–10.4)
CHLORIDE SERPL-SCNC: 106 MMOL/L (ref 97–107)
CO2 SERPL-SCNC: 22 MMOL/L (ref 24–31)
COLOR UR: ABNORMAL
CREAT SERPL-MCNC: 0.9 MG/DL (ref 0.4–1.6)
CREAT UR-MCNC: 104.5 MG/DL
EGFRCR SERPLBLD CKD-EPI 2021: 66 ML/MIN/1.73M*2
EST. AVERAGE GLUCOSE BLD GHB EST-MCNC: 183 MG/DL
GLUCOSE SERPL-MCNC: 195 MG/DL (ref 65–99)
GLUCOSE UR STRIP.AUTO-MCNC: ABNORMAL MG/DL
HBA1C MFR BLD: 8 %
HYALINE CASTS #/AREA URNS AUTO: ABNORMAL /LPF
KETONES UR STRIP.AUTO-MCNC: NEGATIVE MG/DL
LEUKOCYTE ESTERASE UR QL STRIP.AUTO: ABNORMAL
MICROALBUMIN UR-MCNC: <12 MG/L (ref 0–23)
MICROALBUMIN/CREAT UR: NORMAL MG/G{CREAT}
MUCOUS THREADS #/AREA URNS AUTO: ABNORMAL /LPF
NITRITE UR QL STRIP.AUTO: NEGATIVE
PH UR STRIP.AUTO: 5 [PH]
POTASSIUM SERPL-SCNC: 3.9 MMOL/L (ref 3.4–5.1)
PROT SERPL-MCNC: 6.6 G/DL (ref 5.9–7.9)
PROT UR STRIP.AUTO-MCNC: NEGATIVE MG/DL
RBC # UR STRIP.AUTO: ABNORMAL /UL
RBC #/AREA URNS AUTO: >20 /HPF
SODIUM SERPL-SCNC: 141 MMOL/L (ref 133–145)
SP GR UR STRIP.AUTO: 1.02
SQUAMOUS #/AREA URNS AUTO: ABNORMAL /HPF
UROBILINOGEN UR STRIP.AUTO-MCNC: NORMAL MG/DL
WBC #/AREA URNS AUTO: ABNORMAL /HPF

## 2024-06-03 PROCEDURE — 82306 VITAMIN D 25 HYDROXY: CPT

## 2024-06-03 PROCEDURE — 83036 HEMOGLOBIN GLYCOSYLATED A1C: CPT

## 2024-06-03 PROCEDURE — 36415 COLL VENOUS BLD VENIPUNCTURE: CPT

## 2024-06-03 PROCEDURE — 80053 COMPREHEN METABOLIC PANEL: CPT

## 2024-06-03 PROCEDURE — 82043 UR ALBUMIN QUANTITATIVE: CPT

## 2024-06-03 PROCEDURE — 82570 ASSAY OF URINE CREATININE: CPT

## 2024-06-03 PROCEDURE — 81001 URINALYSIS AUTO W/SCOPE: CPT

## 2024-06-05 ENCOUNTER — OFFICE VISIT (OUTPATIENT)
Dept: CARDIOLOGY | Facility: CLINIC | Age: 74
End: 2024-06-05
Payer: COMMERCIAL

## 2024-06-05 VITALS
BODY MASS INDEX: 31.98 KG/M2 | DIASTOLIC BLOOD PRESSURE: 80 MMHG | HEART RATE: 72 BPM | WEIGHT: 176 LBS | SYSTOLIC BLOOD PRESSURE: 142 MMHG

## 2024-06-05 DIAGNOSIS — I10 PRIMARY HYPERTENSION: Primary | ICD-10-CM

## 2024-06-05 PROCEDURE — 1036F TOBACCO NON-USER: CPT | Performed by: INTERNAL MEDICINE

## 2024-06-05 PROCEDURE — 1159F MED LIST DOCD IN RCRD: CPT | Performed by: INTERNAL MEDICINE

## 2024-06-05 PROCEDURE — 3079F DIAST BP 80-89 MM HG: CPT | Performed by: INTERNAL MEDICINE

## 2024-06-05 PROCEDURE — 1126F AMNT PAIN NOTED NONE PRSNT: CPT | Performed by: INTERNAL MEDICINE

## 2024-06-05 PROCEDURE — 99213 OFFICE O/P EST LOW 20 MIN: CPT | Performed by: INTERNAL MEDICINE

## 2024-06-05 PROCEDURE — 3077F SYST BP >= 140 MM HG: CPT | Performed by: INTERNAL MEDICINE

## 2024-06-05 ASSESSMENT — ENCOUNTER SYMPTOMS
DYSPNEA ON EXERTION: 0
SHORTNESS OF BREATH: 0
ABDOMINAL PAIN: 0
BLURRED VISION: 0
HEMATURIA: 0
PARESTHESIAS: 0
DYSURIA: 0
COUGH: 0
PALPITATIONS: 0
NUMBNESS: 0

## 2024-06-05 ASSESSMENT — PAIN SCALES - GENERAL: PAINLEVEL: 0-NO PAIN

## 2024-06-05 NOTE — PROGRESS NOTES
Subjective   Beryl Sierra is a 74 y.o. female.    Chief Complaint:  Primary hypertension (6 month f/u)    HPI  Physically patient feels very well.  No chest pain or palpitations.  No unusual shortness of breath.  Stays physically active without any significant restriction.    Review of Systems   Constitutional: Negative for malaise/fatigue.   HENT:  Negative for congestion.    Eyes:  Negative for blurred vision.   Cardiovascular:  Negative for chest pain, dyspnea on exertion and palpitations.   Respiratory:  Negative for cough and shortness of breath.    Musculoskeletal:  Negative for joint pain.   Gastrointestinal:  Negative for abdominal pain.   Genitourinary:  Negative for dysuria and hematuria.   Neurological:  Negative for numbness and paresthesias.       Objective   Constitutional:       Appearance: Not in distress.   Eyes:      Conjunctiva/sclera: Conjunctivae normal.   Neck:      Vascular: JVD normal.   Pulmonary:      Breath sounds: Normal breath sounds. No wheezing. No rhonchi. No rales.   Cardiovascular:      Normal rate. Regular rhythm.      Murmurs: There is no murmur.      No gallop.  No click. No rub.   Abdominal:      Palpations: Abdomen is soft.   Neurological:      General: No focal deficit present.      Mental Status: Alert.         Lab Review:       Assessment/Plan   The encounter diagnosis was Primary hypertension.    Primary hypertension  Reviewed home blood pressure log and similar to my reading systolic pressures in the 130s and 140s primarily.  Patient has been intolerant to multiple medications in the past.  She is resistant to restarting any medication at this time.  Review of adverse reactions I do not see a trial of an angiotensin receptor blocker which would be an option.  At this time patient would rather just treat conservatively with diet exercise and sodium restriction.  She will continue to check blood pressures and keep a log.  And we will rediscuss options in the future.  She  is thankful today that I am not forcing another medication on her.

## 2024-06-05 NOTE — ASSESSMENT & PLAN NOTE
Reviewed home blood pressure log and similar to my reading systolic pressures in the 130s and 140s primarily.  Patient has been intolerant to multiple medications in the past.  She is resistant to restarting any medication at this time.  Review of adverse reactions I do not see a trial of an angiotensin receptor blocker which would be an option.  At this time patient would rather just treat conservatively with diet exercise and sodium restriction.  She will continue to check blood pressures and keep a log.  And we will rediscuss options in the future.  She is thankful today that I am not forcing another medication on her.

## 2024-06-11 PROBLEM — E78.00 HYPERCHOLESTEROLEMIA: Status: ACTIVE | Noted: 2020-07-31

## 2024-06-11 PROBLEM — S09.90XA INJURY OF HEAD: Status: RESOLVED | Noted: 2024-06-11 | Resolved: 2024-06-11

## 2024-06-11 PROBLEM — R07.89 CHEST WALL PAIN: Status: RESOLVED | Noted: 2024-06-11 | Resolved: 2024-06-11

## 2024-06-11 PROBLEM — Z28.310 UNVACCINATED FOR COVID-19: Status: ACTIVE | Noted: 2022-04-28

## 2024-06-11 PROBLEM — N39.0 ACUTE URINARY TRACT INFECTION: Status: RESOLVED | Noted: 2021-03-30 | Resolved: 2024-06-11

## 2024-06-11 PROBLEM — E87.6 HYPOKALEMIA: Status: ACTIVE | Noted: 2024-06-11

## 2024-06-11 PROBLEM — M48.00 STENOSIS OF INTERVERTEBRAL FORAMINA: Status: ACTIVE | Noted: 2023-09-04
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
## 2024-06-11 PROBLEM — M79.673 PAIN OF FOOT: Status: RESOLVED | Noted: 2020-07-06 | Resolved: 2024-06-11

## 2024-06-11 PROBLEM — K52.9 GASTROENTERITIS: Status: RESOLVED | Noted: 2023-09-04 | Resolved: 2024-06-11

## 2024-06-11 PROBLEM — R40.1 CLOUDED CONSCIOUSNESS: Status: ACTIVE | Noted: 2020-07-06

## 2024-06-11 PROBLEM — R31.9 HEMATURIA: Status: RESOLVED | Noted: 2020-01-17 | Resolved: 2024-06-11

## 2024-06-11 PROBLEM — M54.32 LEFT SIDED SCIATICA: Status: RESOLVED | Noted: 2019-05-08 | Resolved: 2024-06-11

## 2024-06-11 PROBLEM — S20.219A CONTUSION OF CHEST: Status: RESOLVED | Noted: 2024-06-11 | Resolved: 2024-06-11

## 2024-06-11 PROBLEM — Z20.822 CONTACT WITH AND (SUSPECTED) EXPOSURE TO COVID-19: Status: RESOLVED | Noted: 2022-04-28 | Resolved: 2024-06-11

## 2024-06-11 PROBLEM — T88.1XXA: Status: ACTIVE | Noted: 2021-03-26

## 2024-06-11 PROBLEM — R10.9 ABDOMINAL PAIN: Status: RESOLVED | Noted: 2022-06-27 | Resolved: 2024-06-11

## 2024-06-11 PROBLEM — I10 HYPERTENSION: Status: ACTIVE | Noted: 2023-09-30

## 2024-06-11 PROBLEM — N18.31 STAGE 3A CHRONIC KIDNEY DISEASE (MULTI): Status: RESOLVED | Noted: 2024-02-12 | Resolved: 2024-06-11
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
## 2024-06-11 PROBLEM — R42 DIZZINESS: Status: ACTIVE | Noted: 2022-04-28

## 2024-06-11 PROBLEM — G56.20 CUBITAL TUNNEL SYNDROME: Status: RESOLVED | Noted: 2019-06-14 | Resolved: 2024-06-11

## 2024-06-11 PROBLEM — T50.Z95A ADVERSE EFFECT OF VACCINE: Status: ACTIVE | Noted: 2021-02-05

## 2024-06-11 PROBLEM — F41.9 ANXIETY: Status: RESOLVED | Noted: 2020-07-31 | Resolved: 2024-06-11

## 2024-06-11 PROBLEM — R11.0 NAUSEA: Status: RESOLVED | Noted: 2022-06-27 | Resolved: 2024-06-11

## 2024-06-13 ENCOUNTER — TELEPHONE (OUTPATIENT)
Dept: PRIMARY CARE | Facility: CLINIC | Age: 74
End: 2024-06-13

## 2024-06-13 ENCOUNTER — APPOINTMENT (OUTPATIENT)
Dept: PRIMARY CARE | Facility: CLINIC | Age: 74
End: 2024-06-13
Payer: COMMERCIAL

## 2024-06-13 VITALS
HEIGHT: 62 IN | DIASTOLIC BLOOD PRESSURE: 90 MMHG | OXYGEN SATURATION: 98 % | TEMPERATURE: 97.6 F | SYSTOLIC BLOOD PRESSURE: 164 MMHG | WEIGHT: 176 LBS | BODY MASS INDEX: 32.39 KG/M2 | HEART RATE: 78 BPM

## 2024-06-13 DIAGNOSIS — I10 PRIMARY HYPERTENSION: ICD-10-CM

## 2024-06-13 DIAGNOSIS — E11.65 TYPE 2 DIABETES MELLITUS WITH HYPERGLYCEMIA, WITHOUT LONG-TERM CURRENT USE OF INSULIN (MULTI): ICD-10-CM

## 2024-06-13 DIAGNOSIS — R31.21 ASYMPTOMATIC MICROSCOPIC HEMATURIA: ICD-10-CM

## 2024-06-13 DIAGNOSIS — E11.65 TYPE 2 DIABETES MELLITUS WITH HYPERGLYCEMIA, WITHOUT LONG-TERM CURRENT USE OF INSULIN (MULTI): Primary | ICD-10-CM

## 2024-06-13 PROCEDURE — 99214 OFFICE O/P EST MOD 30 MIN: CPT | Performed by: STUDENT IN AN ORGANIZED HEALTH CARE EDUCATION/TRAINING PROGRAM

## 2024-06-13 PROCEDURE — 1036F TOBACCO NON-USER: CPT | Performed by: STUDENT IN AN ORGANIZED HEALTH CARE EDUCATION/TRAINING PROGRAM

## 2024-06-13 PROCEDURE — 1159F MED LIST DOCD IN RCRD: CPT | Performed by: STUDENT IN AN ORGANIZED HEALTH CARE EDUCATION/TRAINING PROGRAM

## 2024-06-13 PROCEDURE — 3080F DIAST BP >= 90 MM HG: CPT | Performed by: STUDENT IN AN ORGANIZED HEALTH CARE EDUCATION/TRAINING PROGRAM

## 2024-06-13 PROCEDURE — 1160F RVW MEDS BY RX/DR IN RCRD: CPT | Performed by: STUDENT IN AN ORGANIZED HEALTH CARE EDUCATION/TRAINING PROGRAM

## 2024-06-13 PROCEDURE — 3077F SYST BP >= 140 MM HG: CPT | Performed by: STUDENT IN AN ORGANIZED HEALTH CARE EDUCATION/TRAINING PROGRAM

## 2024-06-13 PROCEDURE — 1124F ACP DISCUSS-NO DSCNMKR DOCD: CPT | Performed by: STUDENT IN AN ORGANIZED HEALTH CARE EDUCATION/TRAINING PROGRAM

## 2024-06-13 ASSESSMENT — ENCOUNTER SYMPTOMS
LOSS OF SENSATION IN FEET: 0
DEPRESSION: 0
OCCASIONAL FEELINGS OF UNSTEADINESS: 0

## 2024-06-13 ASSESSMENT — COLUMBIA-SUICIDE SEVERITY RATING SCALE - C-SSRS
2. HAVE YOU ACTUALLY HAD ANY THOUGHTS OF KILLING YOURSELF?: NO
1. IN THE PAST MONTH, HAVE YOU WISHED YOU WERE DEAD OR WISHED YOU COULD GO TO SLEEP AND NOT WAKE UP?: NO
6. HAVE YOU EVER DONE ANYTHING, STARTED TO DO ANYTHING, OR PREPARED TO DO ANYTHING TO END YOUR LIFE?: NO

## 2024-06-13 ASSESSMENT — PATIENT HEALTH QUESTIONNAIRE - PHQ9
2. FEELING DOWN, DEPRESSED OR HOPELESS: NOT AT ALL
1. LITTLE INTEREST OR PLEASURE IN DOING THINGS: NOT AT ALL
SUM OF ALL RESPONSES TO PHQ9 QUESTIONS 1 AND 2: 0
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
## 2024-06-22 ENCOUNTER — APPOINTMENT (OUTPATIENT)
Dept: RADIOLOGY | Facility: HOSPITAL | Age: 74
End: 2024-06-22
Payer: COMMERCIAL

## 2024-06-22 ENCOUNTER — HOSPITAL ENCOUNTER (EMERGENCY)
Facility: HOSPITAL | Age: 74
Discharge: HOME | End: 2024-06-22
Attending: STUDENT IN AN ORGANIZED HEALTH CARE EDUCATION/TRAINING PROGRAM
Payer: COMMERCIAL

## 2024-06-22 ENCOUNTER — APPOINTMENT (OUTPATIENT)
Dept: CARDIOLOGY | Facility: HOSPITAL | Age: 74
End: 2024-06-22
Payer: COMMERCIAL

## 2024-06-22 VITALS
HEART RATE: 86 BPM | WEIGHT: 174.6 LBS | HEIGHT: 62 IN | BODY MASS INDEX: 32.13 KG/M2 | DIASTOLIC BLOOD PRESSURE: 99 MMHG | TEMPERATURE: 97.8 F | OXYGEN SATURATION: 97 % | SYSTOLIC BLOOD PRESSURE: 157 MMHG | RESPIRATION RATE: 17 BRPM

## 2024-06-22 DIAGNOSIS — S16.1XXA CERVICAL STRAIN, ACUTE, INITIAL ENCOUNTER: ICD-10-CM

## 2024-06-22 DIAGNOSIS — Z04.1 EXAM FOLLOWING MVC (MOTOR VEHICLE COLLISION), NO APPARENT INJURY: Primary | ICD-10-CM

## 2024-06-22 LAB
ANION GAP SERPL CALC-SCNC: 15 MMOL/L
BASOPHILS # BLD AUTO: 0.02 X10*3/UL (ref 0–0.1)
BASOPHILS NFR BLD AUTO: 0.3 %
BUN SERPL-MCNC: 17 MG/DL (ref 8–25)
CALCIUM SERPL-MCNC: 9.5 MG/DL (ref 8.5–10.4)
CHLORIDE SERPL-SCNC: 104 MMOL/L (ref 97–107)
CO2 SERPL-SCNC: 20 MMOL/L (ref 24–31)
CREAT SERPL-MCNC: 0.9 MG/DL (ref 0.4–1.6)
EGFRCR SERPLBLD CKD-EPI 2021: 66 ML/MIN/1.73M*2
EOSINOPHIL # BLD AUTO: 0.12 X10*3/UL (ref 0–0.4)
EOSINOPHIL NFR BLD AUTO: 2.1 %
ERYTHROCYTE [DISTWIDTH] IN BLOOD BY AUTOMATED COUNT: 12.5 % (ref 11.5–14.5)
GLUCOSE SERPL-MCNC: 143 MG/DL (ref 65–99)
HCT VFR BLD AUTO: 41.2 % (ref 36–46)
HGB BLD-MCNC: 14.4 G/DL (ref 12–16)
IMM GRANULOCYTES # BLD AUTO: 0.02 X10*3/UL (ref 0–0.5)
IMM GRANULOCYTES NFR BLD AUTO: 0.3 % (ref 0–0.9)
LYMPHOCYTES # BLD AUTO: 1.68 X10*3/UL (ref 0.8–3)
LYMPHOCYTES NFR BLD AUTO: 29.1 %
MCH RBC QN AUTO: 31.2 PG (ref 26–34)
MCHC RBC AUTO-ENTMCNC: 35 G/DL (ref 32–36)
MCV RBC AUTO: 89 FL (ref 80–100)
MONOCYTES # BLD AUTO: 0.29 X10*3/UL (ref 0.05–0.8)
MONOCYTES NFR BLD AUTO: 5 %
NEUTROPHILS # BLD AUTO: 3.64 X10*3/UL (ref 1.6–5.5)
NEUTROPHILS NFR BLD AUTO: 63.2 %
NRBC BLD-RTO: 0 /100 WBCS (ref 0–0)
PLATELET # BLD AUTO: 183 X10*3/UL (ref 150–450)
POTASSIUM SERPL-SCNC: 4.1 MMOL/L (ref 3.4–5.1)
RBC # BLD AUTO: 4.62 X10*6/UL (ref 4–5.2)
SODIUM SERPL-SCNC: 139 MMOL/L (ref 133–145)
TROPONIN T SERPL-MCNC: 11 NG/L
WBC # BLD AUTO: 5.8 X10*3/UL (ref 4.4–11.3)

## 2024-06-22 PROCEDURE — 80048 BASIC METABOLIC PNL TOTAL CA: CPT | Performed by: STUDENT IN AN ORGANIZED HEALTH CARE EDUCATION/TRAINING PROGRAM

## 2024-06-22 PROCEDURE — 84484 ASSAY OF TROPONIN QUANT: CPT | Performed by: STUDENT IN AN ORGANIZED HEALTH CARE EDUCATION/TRAINING PROGRAM

## 2024-06-22 PROCEDURE — 2500000004 HC RX 250 GENERAL PHARMACY W/ HCPCS (ALT 636 FOR OP/ED): Performed by: STUDENT IN AN ORGANIZED HEALTH CARE EDUCATION/TRAINING PROGRAM

## 2024-06-22 PROCEDURE — 93005 ELECTROCARDIOGRAM TRACING: CPT

## 2024-06-22 PROCEDURE — 99285 EMERGENCY DEPT VISIT HI MDM: CPT | Mod: 25

## 2024-06-22 PROCEDURE — 36415 COLL VENOUS BLD VENIPUNCTURE: CPT | Performed by: STUDENT IN AN ORGANIZED HEALTH CARE EDUCATION/TRAINING PROGRAM

## 2024-06-22 PROCEDURE — 72125 CT NECK SPINE W/O DYE: CPT | Performed by: RADIOLOGY

## 2024-06-22 PROCEDURE — 85025 COMPLETE CBC W/AUTO DIFF WBC: CPT | Performed by: STUDENT IN AN ORGANIZED HEALTH CARE EDUCATION/TRAINING PROGRAM

## 2024-06-22 PROCEDURE — 96374 THER/PROPH/DIAG INJ IV PUSH: CPT

## 2024-06-22 PROCEDURE — 72125 CT NECK SPINE W/O DYE: CPT

## 2024-06-22 PROCEDURE — 70450 CT HEAD/BRAIN W/O DYE: CPT | Performed by: RADIOLOGY

## 2024-06-22 PROCEDURE — 70450 CT HEAD/BRAIN W/O DYE: CPT

## 2024-06-22 RX ORDER — KETOROLAC TROMETHAMINE 30 MG/ML
15 INJECTION, SOLUTION INTRAMUSCULAR; INTRAVENOUS ONCE
Status: DISCONTINUED | OUTPATIENT
Start: 2024-06-22 | End: 2024-06-22

## 2024-06-22 RX ORDER — KETOROLAC TROMETHAMINE 30 MG/ML
15 INJECTION, SOLUTION INTRAMUSCULAR; INTRAVENOUS ONCE
Status: COMPLETED | OUTPATIENT
Start: 2024-06-22 | End: 2024-06-22

## 2024-06-22 RX ADMIN — KETOROLAC TROMETHAMINE 15 MG: 30 INJECTION, SOLUTION INTRAMUSCULAR at 15:09

## 2024-06-22 ASSESSMENT — PAIN DESCRIPTION - LOCATION
LOCATION: NECK
LOCATION: NECK

## 2024-06-22 ASSESSMENT — PAIN SCALES - GENERAL
PAINLEVEL_OUTOF10: 0 - NO PAIN
PAINLEVEL_OUTOF10: 3
PAINLEVEL_OUTOF10: 0 - NO PAIN
PAINLEVEL_OUTOF10: 0 - NO PAIN

## 2024-06-22 ASSESSMENT — PAIN DESCRIPTION - PAIN TYPE
TYPE: ACUTE PAIN
TYPE: ACUTE PAIN

## 2024-06-22 ASSESSMENT — PAIN DESCRIPTION - FREQUENCY: FREQUENCY: CONSTANT/CONTINUOUS

## 2024-06-22 ASSESSMENT — PAIN DESCRIPTION - PROGRESSION: CLINICAL_PROGRESSION: NOT CHANGED

## 2024-06-22 ASSESSMENT — PAIN DESCRIPTION - ORIENTATION: ORIENTATION: RIGHT

## 2024-06-22 ASSESSMENT — PAIN - FUNCTIONAL ASSESSMENT
PAIN_FUNCTIONAL_ASSESSMENT: 0-10
PAIN_FUNCTIONAL_ASSESSMENT: 0-10

## 2024-06-22 ASSESSMENT — PAIN DESCRIPTION - DESCRIPTORS: DESCRIPTORS: DULL
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
## 2024-06-24 LAB
ATRIAL RATE: 68 BPM
P AXIS: 17 DEGREES
P OFFSET: 178 MS
P ONSET: 133 MS
PR INTERVAL: 162 MS
Q ONSET: 214 MS
QRS COUNT: 11 BEATS
QRS DURATION: 82 MS
QT INTERVAL: 400 MS
QTC CALCULATION(BAZETT): 425 MS
QTC FREDERICIA: 417 MS
R AXIS: -27 DEGREES
T AXIS: 3 DEGREES
T OFFSET: 414 MS
VENTRICULAR RATE: 68 BPM

## 2024-06-25 ENCOUNTER — TELEPHONE (OUTPATIENT)
Dept: PRIMARY CARE | Facility: CLINIC | Age: 74
End: 2024-06-25
Payer: COMMERCIAL
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
## 2024-07-01 ENCOUNTER — APPOINTMENT (OUTPATIENT)
Dept: PRIMARY CARE | Facility: CLINIC | Age: 74
End: 2024-07-01
Payer: COMMERCIAL

## 2024-07-01 VITALS
DIASTOLIC BLOOD PRESSURE: 78 MMHG | TEMPERATURE: 97.9 F | SYSTOLIC BLOOD PRESSURE: 138 MMHG | WEIGHT: 175 LBS | OXYGEN SATURATION: 98 % | HEART RATE: 84 BPM | BODY MASS INDEX: 32.01 KG/M2

## 2024-07-01 DIAGNOSIS — M54.2 NECK PAIN: ICD-10-CM
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
## 2024-07-01 DIAGNOSIS — S06.0X0A CONCUSSION WITHOUT LOSS OF CONSCIOUSNESS, INITIAL ENCOUNTER: ICD-10-CM

## 2024-07-01 PROCEDURE — 3078F DIAST BP <80 MM HG: CPT | Performed by: STUDENT IN AN ORGANIZED HEALTH CARE EDUCATION/TRAINING PROGRAM

## 2024-07-01 PROCEDURE — 1159F MED LIST DOCD IN RCRD: CPT | Performed by: STUDENT IN AN ORGANIZED HEALTH CARE EDUCATION/TRAINING PROGRAM

## 2024-07-01 PROCEDURE — 99214 OFFICE O/P EST MOD 30 MIN: CPT | Performed by: STUDENT IN AN ORGANIZED HEALTH CARE EDUCATION/TRAINING PROGRAM

## 2024-07-01 PROCEDURE — 3075F SYST BP GE 130 - 139MM HG: CPT | Performed by: STUDENT IN AN ORGANIZED HEALTH CARE EDUCATION/TRAINING PROGRAM
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
## 2024-07-08 ENCOUNTER — TELEPHONE (OUTPATIENT)
Dept: PRIMARY CARE | Facility: CLINIC | Age: 74
End: 2024-07-08
Payer: COMMERCIAL
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
## 2024-07-08 DIAGNOSIS — S06.0X0S CONCUSSION WITHOUT LOSS OF CONSCIOUSNESS, SEQUELA (CMS-HCC): ICD-10-CM

## 2024-07-16 ASSESSMENT — ENCOUNTER SYMPTOMS
HEADACHES: 1
BACK PAIN: 1
WEAKNESS: 0
DIZZINESS: 0
NECK PAIN: 1
NUMBNESS: 0
LIGHT-HEADEDNESS: 0
ABDOMINAL PAIN: 0
CHILLS: 0
PHOTOPHOBIA: 0
SHORTNESS OF BREATH: 0
FEVER: 0

## 2024-07-19 ASSESSMENT — ENCOUNTER SYMPTOMS
MYALGIAS: 1
CARDIOVASCULAR NEGATIVE: 1
RESPIRATORY NEGATIVE: 1
ARTHRALGIAS: 1
CONSTITUTIONAL NEGATIVE: 1

## 2024-07-19 NOTE — PATIENT INSTRUCTIONS
Discussed head injury and second impact syndrome in detail with the patient and/or parent(s) and/or legal guardian(s) to the level of their understanding at the time of this office.  Concussion handout provided to the patient and/or parent(s) and/or legal guardian(s) at the time of this office visit.  Reviewed worsening signs and symptoms as well as what to look for with the patient and should these symptoms occur, advised to call the office and/or go to the emergency department immediately.  Recommendation over-the-counter supplements of EPA DHA Omega-3 fatty acids/Fish oilds take 3-4g a day, Coenzyme Q-10 take 300mg a day, Magnesium Oxide 500mg a day, as well as Gingko Biloba 120mg a day to help speed up the recovery from concussions and cut down on the signs and symptoms.  Patient may take OTC Tylenol Extra Strength, may take 1 tablet every 6-8 hours as needed with food.  Patient advised regarding the risk and/or potential adverse reactions and/or side effects of any prescribed medications along with any over-the-counter medications or any supplements used. Patient advised to seek immediate medical care if any adverse reactions occur. The patient and/or patients(s) parents(s) verbalized their understanding.  Recommend that the patient begin relative rest which includes activities of daily living and reduced screen time immediately after injury and up to two days status post injury. The patient may return to light-intensity physical activity, such as walking that does not more than mildly exacerbate symptoms during the initial 24-48 hours following concussion  Prior to Phase 6 of Return to Moderate/Heavy job demands protocol, re-evaluation must be performed and show return to baseline or normal, whichever is available.   Physical therapy ordered for cervical spine and/or vestibular ocular and balance therapy during today's visit   Follow up 4 weeks (office# [phone])
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
## 2024-07-22 ENCOUNTER — OFFICE VISIT (OUTPATIENT)
Dept: SPORTS MEDICINE | Facility: CLINIC | Age: 74
End: 2024-07-22
Payer: MEDICARE

## 2024-07-22 VITALS
WEIGHT: 175 LBS | BODY MASS INDEX: 32.2 KG/M2 | DIASTOLIC BLOOD PRESSURE: 88 MMHG | HEART RATE: 83 BPM | HEIGHT: 62 IN | SYSTOLIC BLOOD PRESSURE: 138 MMHG

## 2024-07-22 DIAGNOSIS — M25.512 LEFT SHOULDER PAIN, UNSPECIFIED CHRONICITY: ICD-10-CM
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
## 2024-07-22 DIAGNOSIS — F40.298 PHONOPHOBIA: ICD-10-CM

## 2024-07-22 DIAGNOSIS — S16.1XXA CERVICAL STRAIN, ACUTE, INITIAL ENCOUNTER: ICD-10-CM

## 2024-07-22 DIAGNOSIS — M54.2 CERVICAL PAIN: ICD-10-CM

## 2024-07-22 DIAGNOSIS — R26.89 BALANCE DISORDER: ICD-10-CM

## 2024-07-22 DIAGNOSIS — G44.319 ACUTE POST-TRAUMATIC HEADACHE, NOT INTRACTABLE: ICD-10-CM

## 2024-07-22 DIAGNOSIS — H53.149 PHOTOPHOBIA: ICD-10-CM

## 2024-07-22 DIAGNOSIS — H53.9 VISUAL DISORDER: ICD-10-CM
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
## 2024-07-22 DIAGNOSIS — S06.0X0A CONCUSSION WITHOUT LOSS OF CONSCIOUSNESS, INITIAL ENCOUNTER: Primary | ICD-10-CM

## 2024-07-22 DIAGNOSIS — R42 DIZZINESS: ICD-10-CM

## 2024-07-22 PROCEDURE — 3075F SYST BP GE 130 - 139MM HG: CPT | Performed by: NURSE PRACTITIONER

## 2024-07-22 PROCEDURE — 1125F AMNT PAIN NOTED PAIN PRSNT: CPT | Performed by: NURSE PRACTITIONER

## 2024-07-22 PROCEDURE — 1159F MED LIST DOCD IN RCRD: CPT | Performed by: NURSE PRACTITIONER

## 2024-07-22 PROCEDURE — 99215 OFFICE O/P EST HI 40 MIN: CPT | Performed by: NURSE PRACTITIONER

## 2024-07-22 PROCEDURE — 1036F TOBACCO NON-USER: CPT | Performed by: NURSE PRACTITIONER

## 2024-07-22 PROCEDURE — 3079F DIAST BP 80-89 MM HG: CPT | Performed by: NURSE PRACTITIONER

## 2024-07-22 RX ORDER — MECLIZINE HYDROCHLORIDE 25 MG/1
25 TABLET ORAL 3 TIMES DAILY PRN
COMMUNITY

## 2024-07-22 ASSESSMENT — PATIENT HEALTH QUESTIONNAIRE - PHQ9
1. LITTLE INTEREST OR PLEASURE IN DOING THINGS: NOT AT ALL
SUM OF ALL RESPONSES TO PHQ9 QUESTIONS 1 AND 2: 0
2. FEELING DOWN, DEPRESSED OR HOPELESS: NOT AT ALL

## 2024-07-22 ASSESSMENT — PAIN SCALES - GENERAL: PAINLEVEL: 5

## 2024-07-22 ASSESSMENT — ENCOUNTER SYMPTOMS
LOSS OF SENSATION IN FEET: 0
DEPRESSION: 0
OCCASIONAL FEELINGS OF UNSTEADINESS: 0

## 2024-08-05 ENCOUNTER — APPOINTMENT (OUTPATIENT)
Dept: NEUROSURGERY | Facility: CLINIC | Age: 74
End: 2024-08-05
Payer: COMMERCIAL

## 2024-08-14 ENCOUNTER — APPOINTMENT (OUTPATIENT)
Dept: PHYSICAL THERAPY | Facility: CLINIC | Age: 74
End: 2024-08-14
Payer: COMMERCIAL

## 2024-08-14 ENCOUNTER — APPOINTMENT (OUTPATIENT)
Dept: PRIMARY CARE | Facility: CLINIC | Age: 74
End: 2024-08-14
Payer: COMMERCIAL

## 2024-08-19 ENCOUNTER — APPOINTMENT (OUTPATIENT)
Dept: SPORTS MEDICINE | Facility: CLINIC | Age: 74
End: 2024-08-19
Payer: COMMERCIAL

## 2024-08-19 ENCOUNTER — CLINICAL SUPPORT (OUTPATIENT)
Dept: PHYSICAL THERAPY | Facility: CLINIC | Age: 74
End: 2024-08-19
Payer: MEDICARE
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
## 2024-08-19 DIAGNOSIS — H53.9 VISUAL DISORDER: ICD-10-CM

## 2024-08-19 DIAGNOSIS — M54.2 CERVICAL PAIN: ICD-10-CM

## 2024-08-19 DIAGNOSIS — R26.89 BALANCE DISORDER: ICD-10-CM

## 2024-08-19 DIAGNOSIS — M25.512 LEFT SHOULDER PAIN, UNSPECIFIED CHRONICITY: ICD-10-CM

## 2024-08-19 DIAGNOSIS — R42 DIZZINESS: ICD-10-CM

## 2024-08-19 DIAGNOSIS — H53.149 PHOTOPHOBIA: ICD-10-CM

## 2024-08-19 DIAGNOSIS — S16.1XXA CERVICAL STRAIN, ACUTE, INITIAL ENCOUNTER: ICD-10-CM

## 2024-08-19 DIAGNOSIS — G44.319 ACUTE POST-TRAUMATIC HEADACHE, NOT INTRACTABLE: ICD-10-CM

## 2024-08-19 DIAGNOSIS — S06.0X0A CONCUSSION WITHOUT LOSS OF CONSCIOUSNESS, INITIAL ENCOUNTER: ICD-10-CM

## 2024-08-19 DIAGNOSIS — F40.298 PHONOPHOBIA: ICD-10-CM

## 2024-08-19 PROCEDURE — 97140 MANUAL THERAPY 1/> REGIONS: CPT | Mod: GP

## 2024-08-19 PROCEDURE — 97162 PT EVAL MOD COMPLEX 30 MIN: CPT | Mod: GP

## 2024-08-19 PROCEDURE — 97112 NEUROMUSCULAR REEDUCATION: CPT | Mod: GP

## 2024-08-19 ASSESSMENT — ENCOUNTER SYMPTOMS
PAIN SCALE AT HIGHEST: 10
ALLEVIATING FACTORS: MEDICATIONS
PAIN SCALE AT LOWEST: 2
PAIN SCALE: 5
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
## 2024-08-20 ENCOUNTER — APPOINTMENT (OUTPATIENT)
Dept: SPORTS MEDICINE | Facility: CLINIC | Age: 74
End: 2024-08-20
Payer: COMMERCIAL
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
## 2024-08-20 DIAGNOSIS — H53.9 VISUAL DISORDER: ICD-10-CM

## 2024-08-20 DIAGNOSIS — F40.298 PHONOPHOBIA: ICD-10-CM

## 2024-08-20 DIAGNOSIS — R26.89 BALANCE DISORDER: ICD-10-CM

## 2024-08-20 DIAGNOSIS — S16.1XXD CERVICAL STRAIN, SUBSEQUENT ENCOUNTER: ICD-10-CM

## 2024-08-20 DIAGNOSIS — R42 DIZZINESS: ICD-10-CM

## 2024-08-20 DIAGNOSIS — M54.2 CERVICAL PAIN: ICD-10-CM

## 2024-08-20 DIAGNOSIS — M25.512 LEFT SHOULDER PAIN, UNSPECIFIED CHRONICITY: ICD-10-CM

## 2024-08-20 DIAGNOSIS — H53.149 PHOTOPHOBIA: ICD-10-CM

## 2024-08-20 DIAGNOSIS — G44.319 ACUTE POST-TRAUMATIC HEADACHE, NOT INTRACTABLE: ICD-10-CM

## 2024-08-20 DIAGNOSIS — S06.0X0D CONCUSSION WITHOUT LOSS OF CONSCIOUSNESS, SUBSEQUENT ENCOUNTER: ICD-10-CM

## 2024-08-20 ASSESSMENT — ENCOUNTER SYMPTOMS
OCCASIONAL FEELINGS OF UNSTEADINESS: 1
RESPIRATORY NEGATIVE: 1
MYALGIAS: 1
DEPRESSION: 0
ARTHRALGIAS: 1
CARDIOVASCULAR NEGATIVE: 1
CONSTITUTIONAL NEGATIVE: 1
LOSS OF SENSATION IN FEET: 0

## 2024-08-20 ASSESSMENT — ACTIVITIES OF DAILY LIVING (ADL): POOR_BALANCE: 1

## 2024-08-20 ASSESSMENT — PAIN SCALES - GENERAL: PAINLEVEL_OUTOF10: 5 - MODERATE PAIN

## 2024-08-20 ASSESSMENT — PAIN - FUNCTIONAL ASSESSMENT: PAIN_FUNCTIONAL_ASSESSMENT: 0-10

## 2024-08-20 NOTE — PROGRESS NOTES
Established patient  History Of Present Illness  Beryl Sierra is a 74 y.o. female presenting for her follow up CONCUSSION evaluation.     Past Medical History  She has a past medical history of Abdominal pain (06/27/2022), Abnormal urinalysis (11/24/2020), Accidental fall (06/11/2024), Acute urinary tract infection (03/30/2021), Allergic, Anxiety, Arthritis, Chest wall pain (06/11/2024), Cholelithiasis and cholecystitis without obstruction (09/04/2023), Contact with and (suspected) exposure to covid-19 (04/28/2022), Contusion of chest (06/11/2024), Diabetes mellitus (Multi) (Unknown), Dysuria (05/07/2018), Gall stone (11/27/2019), GERD (gastroesophageal reflux disease), Hypertension (Unknown), Injury of head (06/11/2024), Nausea (06/27/2022), Pain of foot (07/06/2020), and Urinary tract infection.    Surgical History  She has a past surgical history that includes Cholecystectomy and Tonsillectomy.     Social History  She reports that she has never smoked. She has never used smokeless tobacco. She reports that she does not drink alcohol and does not use drugs.    Family History  Family History   Problem Relation Name Age of Onset    Other (Shy-Drager) Mother      Tuberculosis Mother      Heart disease Father Enrique Sierra (Father)     Hypertension Father Enrique Sierra (Father)     Atrial fibrillation Father Enrique Sierra (Father)     Abnormal EKG Father Enrique Sierra (Father)     Angina Father Enrique Sierra (Father)     Diabetes type II Father Enrique Sierra (Father)     Heart attack Father Enrique Sierra (Father)     Heart murmur Father Enrique Sierra (Father)     Cancer Other Uncle      Allergies  Vitamins a,c,e-zinc-copper; Aluminum aspirin; Amlodipine; Aspirin; Atenolol; Cefaclor; Cefuroxime axetil; Cephalexin; Codeine; Covid-19 vaccine, mrna, tel000d1, lnp-s (pfizer); Doxazosin; Doxycycline; Erythromycin; Escitalopram oxalate; Ezetimibe; Latex; Levofloxacin; Lisinopril; Metformin; Metoprolol; Metronidazole;  Dwdee-vdmxk-hufohsx-pramoxine; Nitrofurantoin monohyd/m-cryst; Other; Penicillin; Simvastatin; Streptomycin; Sulfamethoxazole-trimethoprim; Tetracycline; Trimethoprim; Tropicamide; Vancomycin; Zolpidem tartrate; and Hydrochlorothiazide    Review of Systems  Review of Systems   Constitutional: Negative.    Respiratory: Negative.     Cardiovascular: Negative.    Musculoskeletal:  Positive for arthralgias and myalgias.     Last Recorded Vitals  There were no vitals taken for this visit.     The , ARVIND ROSAS was present during today's visit and not limited to physical examination!    Examination:  CERVICAL   TART Findings: Positive Tissue Texture Changes, Asymmetry, Restriction, Tenderness.   Ecchymosis/Bruising: Negative.   Percussion Test (CERVICAL): Positive  Tuning Fork Test (CERVICAL): Negative.   Orientation (CERVICAL): Normal.     ROM (CERVICAL):  Positive DECREASED and painful Forward Flexion, Extension, and Lateral Bending (Side Bending).     Muscle Strength:   Negative: Cervical Flexion, Extension, Left Rotation, Right Rotation, Left Lateral Flexion, Right Lateral Flexion, Trapezius (Shrug), Anterior Deltoid, Posterior Deltoid and Lateral Deltoid.          DTR/Neurological: Symmetrical  +2/+4 Biceps Reflex (C5)  +2/+4 Brachioradialis Reflex (C6)  +2/+4 Triceps Reflex (C7).     Sensation/Neurological Cervical:   Negative, Symmetrical:  C2: Lower jaw and back of head  C3: Upper neck and back of head  C4: Lower neck, upper shoulders, and upper chest  C5: Area of collarbones, lateral upper arms, and upper chest  C6: Lateral forearms, thumbs, anterior index finger, and lateral half of the middle finger  C7: Some of posterior index finger, medial half of middle finger, upper posterior back, and back of arms  C8: Ring finger, little finger, medial forearm, and posterior upper back.     Cervicle Spine Tests:  Lhermitte's Sign: Negative.   Spurling's Test (Atlanto-Axial Compression Test): Negative.  "  Reverse Spurling's Test: Negative.   Cervical Compression with Max Foraminal Compression Test: Negative.   Intervertebral Foramina Compression Test: Negative.   Flexion Compression Test: Positive   Extension Compression Test: Positive    Maximum Compression of the Intervertebral Foramina Test: Negative.   Forward Flexion Test: Positive     Neurological:  CORTICAL FUNCTIONS: normal.   CRANIAL NERVES: normal  MOTOR STRENGTH: normal.   SENSORY: normal.   REFLEXES: normal.   PLANTARS: normal.   CEREBELLAR SIGNS: absent.   TREMORS: absent.   COORDINATION: finger-to-nose and rapid alternating movements were intact.   GAIT AND STATION: Within normal limits.   SPEECH: normal.   MUSCLE BULK: normal.   PRONATOR DRIFT: not present.   INVOLUNTARY MOVEMENTS: no tremors seen.           PNP Psych Exam:  APPEARANCE: appropriate.   BEHAVIOR/DEMEANOR: appropriate.   MOOD: cheerful.   SPEECH: normal.   THOUGHT PROCESSES: coherent.   THOUGHT CONTENT: appropriate.   MEMORY PROBLEMS: none.   INSIGHT: appropriate.   DISORIENTATION: none.        Ophthalmology:  VISUAL ACUITY No change noted by pt.   IRIS WNLs.   PUPILS normal, bilaterally.   VISION normal, bilaterally.     Nystagmus:  NO    Cranial Palpation Exam:  Paritel Bone Normal.   Occipital Bone Normal.   Frontal Bone Normal.      Concussion Examination    Symptoms:  Date of last concussion 24  Rate Symptoms over the past 24 hours: (0 to 6 symptom scale)  Headache: 6/6  Pressure in head: 6/6  Neck Pain: 6/6  Nausea or vomitin/6  Dizziness: 5/6  Blurred vision: 3/6  Balance problems: 4/6  Sensitivity to light: 5/6  Sensitivity to noise:2/6  Feeling slowed down: 6/6  Feeling like in a \"fog\": 5/6  Difficulty concentratin/6  Difficulty rememberin/6  Fatigue or low energy: 6/6  Confusion: 6/6  Drowsiness: 5/6  More emotional: 6/6  Irritability: 5/6  Sadness: 6/6  Nervous or anxious: 6/6  Sleep disturbances: 3/6    Vestibular Ocular Motor Screening (VOMS):  NOT " PERFORMED  Baseline Post Injury 1: Headache 8, Dizziness 3, Nausea 2, and Fogginess 8  Smooth Pursuits Post Injury 1: Headache 9, Dizziness 4, Nausea 3, and Fogginess 8  Horizontal Saccades  Post Inury 1: NOT PERFORMED  Vertical Saccades  Post Injury 1: NOT PERFORMED  Near Point Convergence  Post Injury 1: NOT PERFORMED  Vestibular Ocular Reflex (VOR) Horizontal  Post Injury 1: NOT PERFORMED  Vestibular Ocular Reflex (VOR) Veritical  Post Injury 1: NOT PERFORMED  Visual Motion Sensitivity (VMS)  Post Injury 1:  NOT PERFORMED    Balance: Unable to perform with eyes closed  Foot tested: LEFT/RIGHT (ie: test the non-dominant foot)  Modified MIKAL: NOT PERFORMED  Double leg stance: ___ of 10  Tandem Stance: ____ of 10  Single leg stance: ____ of 10  Total errors: ___ of 10    Imaging and Diagnostics Review:  No new radiographs were obtained today.    Assessment   No diagnosis found.      Plan   Treatment or Intervention:  Once again discussed head injury and second impact syndrome in detail with the patient and/or parent(s) and/or legal guardian(s) to the level of their understanding at the time of this office.  Once again reviewed worsening signs and symptoms as well as what to look for with the patient and should these symptoms occur, advised to call the office and/or go to the emergency department immediately.  Recommendation over-the-counter supplements of EPA DHA Omega-3 fatty acids/Fish oilds take 3-4g a day, Coenzyme Q-10 take 300mg a day, Magnesium Oxide 500mg a day, as well as Gingko Biloba 120mg a day to help speed up the recovery from concussions and cut down on the signs and symptoms.  Patient may take OTC Tylenol Extra Strength, may take 1 tablet every 6-8 hours as needed with food.  Patient advised regarding the risk and/or potential adverse reactions and/or side effects of any prescribed medications along with any over-the-counter medications or any supplements used. Patient advised to seek immediate  medical care if any adverse reactions occur. The patient and/or patients(s) parents(s) verbalized their understanding.  Recommend that the patient begin relative rest which includes activities of daily living and reduced screen time immediately after injury and up to two days status post injury. The patient may return to light-intensity physical activity, such as walking that does not more than mildly exacerbate symptoms during the initial 24-48 hours following concussion  Prior to Phase 6 of Return to Moderate/Heavy job demands protocol, re-evaluation must be performed and show return to baseline or normal, whichever is available.   Continue with physical therapy as previously ordered for cervical spine and/or vestibular ocular and balance therapy    Diagnostic studies:      Activity Instructions, Restrictions, and Accommodations:      Consultations/Referrals:  Physical therapy    Follow-up:      ARVIND NEAL on 8/20/24 at 10:47 AM.     Baljit Liriano CNP

## 2024-08-21 ENCOUNTER — TREATMENT (OUTPATIENT)
Dept: PHYSICAL THERAPY | Facility: CLINIC | Age: 74
End: 2024-08-21
Payer: MEDICARE

## 2024-08-21 DIAGNOSIS — F40.298 PHONOPHOBIA: ICD-10-CM

## 2024-08-21 DIAGNOSIS — M54.2 CERVICAL PAIN: ICD-10-CM

## 2024-08-21 DIAGNOSIS — R26.89 BALANCE DISORDER: ICD-10-CM

## 2024-08-21 DIAGNOSIS — H53.149 PHOTOPHOBIA: ICD-10-CM
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
## 2024-08-21 DIAGNOSIS — S16.1XXA CERVICAL STRAIN, ACUTE, INITIAL ENCOUNTER: ICD-10-CM

## 2024-08-21 DIAGNOSIS — S06.0X0A CONCUSSION WITHOUT LOSS OF CONSCIOUSNESS, INITIAL ENCOUNTER: ICD-10-CM
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
## 2024-08-21 DIAGNOSIS — G44.319 ACUTE POST-TRAUMATIC HEADACHE, NOT INTRACTABLE: ICD-10-CM

## 2024-08-21 DIAGNOSIS — R42 DIZZINESS: ICD-10-CM

## 2024-08-21 DIAGNOSIS — M25.512 LEFT SHOULDER PAIN, UNSPECIFIED CHRONICITY: ICD-10-CM

## 2024-08-21 DIAGNOSIS — H53.9 VISUAL DISORDER: ICD-10-CM

## 2024-08-21 PROCEDURE — 97014 ELECTRIC STIMULATION THERAPY: CPT | Mod: GP

## 2024-08-21 PROCEDURE — 97110 THERAPEUTIC EXERCISES: CPT | Mod: GP

## 2024-08-21 PROCEDURE — 97140 MANUAL THERAPY 1/> REGIONS: CPT | Mod: GP
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
## 2024-08-26 ENCOUNTER — TREATMENT (OUTPATIENT)
Dept: PHYSICAL THERAPY | Facility: CLINIC | Age: 74
End: 2024-08-26
Payer: MEDICARE

## 2024-08-26 DIAGNOSIS — S16.1XXA CERVICAL STRAIN, ACUTE, INITIAL ENCOUNTER: ICD-10-CM

## 2024-08-26 DIAGNOSIS — R26.89 BALANCE DISORDER: ICD-10-CM

## 2024-08-26 DIAGNOSIS — S06.0X0A CONCUSSION WITHOUT LOSS OF CONSCIOUSNESS, INITIAL ENCOUNTER: ICD-10-CM
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
## 2024-08-26 DIAGNOSIS — M54.2 CERVICAL PAIN: ICD-10-CM

## 2024-08-26 DIAGNOSIS — G44.319 ACUTE POST-TRAUMATIC HEADACHE, NOT INTRACTABLE: ICD-10-CM
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
## 2024-08-26 DIAGNOSIS — H53.9 VISUAL DISORDER: ICD-10-CM

## 2024-08-26 DIAGNOSIS — R42 DIZZINESS: ICD-10-CM

## 2024-08-26 DIAGNOSIS — M25.512 LEFT SHOULDER PAIN, UNSPECIFIED CHRONICITY: ICD-10-CM

## 2024-08-26 DIAGNOSIS — H53.149 PHOTOPHOBIA: ICD-10-CM

## 2024-08-26 DIAGNOSIS — F40.298 PHONOPHOBIA: ICD-10-CM
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
## 2024-08-26 PROCEDURE — 97112 NEUROMUSCULAR REEDUCATION: CPT | Mod: GP

## 2024-08-26 PROCEDURE — 97014 ELECTRIC STIMULATION THERAPY: CPT | Mod: GP

## 2024-08-26 ASSESSMENT — PAIN - FUNCTIONAL ASSESSMENT: PAIN_FUNCTIONAL_ASSESSMENT: 0-10

## 2024-08-26 ASSESSMENT — PAIN SCALES - GENERAL: PAINLEVEL_OUTOF10: 4
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
## 2024-09-03 ENCOUNTER — TREATMENT (OUTPATIENT)
Dept: PHYSICAL THERAPY | Facility: CLINIC | Age: 74
End: 2024-09-03
Payer: COMMERCIAL

## 2024-09-03 DIAGNOSIS — M54.2 CERVICAL PAIN: ICD-10-CM

## 2024-09-03 DIAGNOSIS — S06.0X0A CONCUSSION WITHOUT LOSS OF CONSCIOUSNESS, INITIAL ENCOUNTER: ICD-10-CM
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
## 2024-09-03 DIAGNOSIS — S16.1XXA CERVICAL STRAIN, ACUTE, INITIAL ENCOUNTER: ICD-10-CM

## 2024-09-03 DIAGNOSIS — R42 DIZZINESS: ICD-10-CM

## 2024-09-03 DIAGNOSIS — H53.9 VISUAL DISORDER: ICD-10-CM

## 2024-09-03 DIAGNOSIS — R26.89 BALANCE DISORDER: ICD-10-CM

## 2024-09-03 DIAGNOSIS — G44.319 ACUTE POST-TRAUMATIC HEADACHE, NOT INTRACTABLE: ICD-10-CM

## 2024-09-03 DIAGNOSIS — F40.298 PHONOPHOBIA: ICD-10-CM

## 2024-09-03 DIAGNOSIS — H53.149 PHOTOPHOBIA: ICD-10-CM

## 2024-09-03 DIAGNOSIS — M25.512 LEFT SHOULDER PAIN, UNSPECIFIED CHRONICITY: ICD-10-CM

## 2024-09-03 PROCEDURE — 97140 MANUAL THERAPY 1/> REGIONS: CPT | Mod: GP

## 2024-09-03 PROCEDURE — 97014 ELECTRIC STIMULATION THERAPY: CPT | Mod: GP
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
## 2024-09-04 ASSESSMENT — PAIN - FUNCTIONAL ASSESSMENT: PAIN_FUNCTIONAL_ASSESSMENT: 0-10

## 2024-09-04 ASSESSMENT — PAIN SCALES - GENERAL: PAINLEVEL_OUTOF10: 8

## 2024-09-05 ENCOUNTER — TREATMENT (OUTPATIENT)
Dept: PHYSICAL THERAPY | Facility: CLINIC | Age: 74
End: 2024-09-05
Payer: COMMERCIAL

## 2024-09-05 DIAGNOSIS — M54.2 CERVICAL PAIN: ICD-10-CM

## 2024-09-05 DIAGNOSIS — H53.149 PHOTOPHOBIA: ICD-10-CM

## 2024-09-05 DIAGNOSIS — G44.319 ACUTE POST-TRAUMATIC HEADACHE, NOT INTRACTABLE: ICD-10-CM

## 2024-09-05 DIAGNOSIS — S06.0X0A CONCUSSION WITHOUT LOSS OF CONSCIOUSNESS, INITIAL ENCOUNTER: ICD-10-CM

## 2024-09-05 DIAGNOSIS — M25.512 LEFT SHOULDER PAIN, UNSPECIFIED CHRONICITY: ICD-10-CM
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
## 2024-09-05 DIAGNOSIS — H53.9 VISUAL DISORDER: ICD-10-CM

## 2024-09-05 DIAGNOSIS — S16.1XXA CERVICAL STRAIN, ACUTE, INITIAL ENCOUNTER: ICD-10-CM
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
## 2024-09-05 DIAGNOSIS — R26.89 BALANCE DISORDER: ICD-10-CM

## 2024-09-05 DIAGNOSIS — R42 DIZZINESS: ICD-10-CM

## 2024-09-05 DIAGNOSIS — F40.298 PHONOPHOBIA: ICD-10-CM

## 2024-09-05 PROCEDURE — 97140 MANUAL THERAPY 1/> REGIONS: CPT | Mod: GP

## 2024-09-05 PROCEDURE — 97112 NEUROMUSCULAR REEDUCATION: CPT | Mod: GP

## 2024-09-05 ASSESSMENT — PAIN SCALES - GENERAL: PAINLEVEL_OUTOF10: 8

## 2024-09-05 ASSESSMENT — PAIN - FUNCTIONAL ASSESSMENT: PAIN_FUNCTIONAL_ASSESSMENT: 0-10
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
## 2024-09-10 ENCOUNTER — TREATMENT (OUTPATIENT)
Dept: PHYSICAL THERAPY | Facility: CLINIC | Age: 74
End: 2024-09-10
Payer: COMMERCIAL

## 2024-09-10 DIAGNOSIS — S16.1XXA CERVICAL STRAIN, ACUTE, INITIAL ENCOUNTER: ICD-10-CM

## 2024-09-10 DIAGNOSIS — R42 DIZZINESS: ICD-10-CM

## 2024-09-10 DIAGNOSIS — R26.89 BALANCE DISORDER: ICD-10-CM

## 2024-09-10 DIAGNOSIS — G44.319 ACUTE POST-TRAUMATIC HEADACHE, NOT INTRACTABLE: ICD-10-CM

## 2024-09-10 DIAGNOSIS — H53.9 VISUAL DISORDER: ICD-10-CM
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
## 2024-09-10 DIAGNOSIS — H53.149 PHOTOPHOBIA: ICD-10-CM
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
## 2024-09-10 DIAGNOSIS — F40.298 PHONOPHOBIA: ICD-10-CM

## 2024-09-10 DIAGNOSIS — M25.512 LEFT SHOULDER PAIN, UNSPECIFIED CHRONICITY: ICD-10-CM

## 2024-09-10 DIAGNOSIS — M54.2 CERVICAL PAIN: ICD-10-CM

## 2024-09-10 DIAGNOSIS — S06.0X0A CONCUSSION WITHOUT LOSS OF CONSCIOUSNESS, INITIAL ENCOUNTER: ICD-10-CM

## 2024-09-10 PROCEDURE — 97140 MANUAL THERAPY 1/> REGIONS: CPT | Mod: GP

## 2024-09-10 PROCEDURE — 97112 NEUROMUSCULAR REEDUCATION: CPT | Mod: GP

## 2024-09-10 ASSESSMENT — PAIN - FUNCTIONAL ASSESSMENT: PAIN_FUNCTIONAL_ASSESSMENT: 0-10

## 2024-09-10 ASSESSMENT — PAIN SCALES - GENERAL: PAINLEVEL_OUTOF10: 3
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
## 2024-09-12 ENCOUNTER — TREATMENT (OUTPATIENT)
Dept: PHYSICAL THERAPY | Facility: CLINIC | Age: 74
End: 2024-09-12
Payer: COMMERCIAL

## 2024-09-12 DIAGNOSIS — R42 DIZZINESS: ICD-10-CM

## 2024-09-12 DIAGNOSIS — H53.149 PHOTOPHOBIA: ICD-10-CM

## 2024-09-12 DIAGNOSIS — G44.319 ACUTE POST-TRAUMATIC HEADACHE, NOT INTRACTABLE: ICD-10-CM

## 2024-09-12 DIAGNOSIS — R26.89 BALANCE DISORDER: ICD-10-CM
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
## 2024-09-12 DIAGNOSIS — F40.298 PHONOPHOBIA: ICD-10-CM

## 2024-09-12 DIAGNOSIS — M54.2 CERVICAL PAIN: ICD-10-CM

## 2024-09-12 DIAGNOSIS — S16.1XXA CERVICAL STRAIN, ACUTE, INITIAL ENCOUNTER: ICD-10-CM

## 2024-09-12 DIAGNOSIS — S06.0X0A CONCUSSION WITHOUT LOSS OF CONSCIOUSNESS, INITIAL ENCOUNTER: ICD-10-CM
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
## 2024-09-12 DIAGNOSIS — H53.9 VISUAL DISORDER: ICD-10-CM

## 2024-09-12 DIAGNOSIS — M25.512 LEFT SHOULDER PAIN, UNSPECIFIED CHRONICITY: ICD-10-CM

## 2024-09-12 PROCEDURE — 97026 INFRARED THERAPY: CPT | Mod: GP

## 2024-09-12 PROCEDURE — 97112 NEUROMUSCULAR REEDUCATION: CPT | Mod: GP

## 2024-09-12 ASSESSMENT — PAIN - FUNCTIONAL ASSESSMENT: PAIN_FUNCTIONAL_ASSESSMENT: 0-10

## 2024-09-12 ASSESSMENT — ENCOUNTER SYMPTOMS
CARDIOVASCULAR NEGATIVE: 1
CONSTITUTIONAL NEGATIVE: 1
RESPIRATORY NEGATIVE: 1
MYALGIAS: 1
ARTHRALGIAS: 1

## 2024-09-12 ASSESSMENT — PAIN SCALES - GENERAL: PAINLEVEL_OUTOF10: 5 - MODERATE PAIN
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
## 2024-09-16 ENCOUNTER — TREATMENT (OUTPATIENT)
Dept: PHYSICAL THERAPY | Facility: CLINIC | Age: 74
End: 2024-09-16
Payer: COMMERCIAL

## 2024-09-16 ENCOUNTER — OFFICE VISIT (OUTPATIENT)
Dept: SPORTS MEDICINE | Facility: CLINIC | Age: 74
End: 2024-09-16
Payer: COMMERCIAL

## 2024-09-16 VITALS
DIASTOLIC BLOOD PRESSURE: 74 MMHG | HEIGHT: 62 IN | WEIGHT: 175 LBS | SYSTOLIC BLOOD PRESSURE: 130 MMHG | HEART RATE: 84 BPM | BODY MASS INDEX: 32.2 KG/M2

## 2024-09-16 DIAGNOSIS — H53.149 PHOTOPHOBIA: ICD-10-CM

## 2024-09-16 DIAGNOSIS — F40.298 PHONOPHOBIA: ICD-10-CM
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
## 2024-09-16 DIAGNOSIS — R26.89 BALANCE DISORDER: ICD-10-CM

## 2024-09-16 DIAGNOSIS — S06.0X0D CONCUSSION WITHOUT LOSS OF CONSCIOUSNESS, SUBSEQUENT ENCOUNTER: Primary | ICD-10-CM

## 2024-09-16 DIAGNOSIS — H53.9 VISUAL DISORDER: ICD-10-CM

## 2024-09-16 DIAGNOSIS — M25.512 LEFT SHOULDER PAIN, UNSPECIFIED CHRONICITY: ICD-10-CM

## 2024-09-16 DIAGNOSIS — M54.2 CERVICAL PAIN: ICD-10-CM

## 2024-09-16 DIAGNOSIS — S16.1XXA CERVICAL STRAIN, ACUTE, INITIAL ENCOUNTER: ICD-10-CM

## 2024-09-16 DIAGNOSIS — G44.319 ACUTE POST-TRAUMATIC HEADACHE, NOT INTRACTABLE: ICD-10-CM

## 2024-09-16 DIAGNOSIS — R42 DIZZINESS: ICD-10-CM

## 2024-09-16 DIAGNOSIS — S06.0X0A CONCUSSION WITHOUT LOSS OF CONSCIOUSNESS, INITIAL ENCOUNTER: ICD-10-CM
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
## 2024-09-16 DIAGNOSIS — S16.1XXD CERVICAL STRAIN, SUBSEQUENT ENCOUNTER: ICD-10-CM
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
## 2024-09-16 PROCEDURE — 1160F RVW MEDS BY RX/DR IN RCRD: CPT | Performed by: NURSE PRACTITIONER

## 2024-09-16 PROCEDURE — 99214 OFFICE O/P EST MOD 30 MIN: CPT | Performed by: NURSE PRACTITIONER

## 2024-09-16 PROCEDURE — 97140 MANUAL THERAPY 1/> REGIONS: CPT | Mod: GP

## 2024-09-16 PROCEDURE — 3078F DIAST BP <80 MM HG: CPT | Performed by: NURSE PRACTITIONER

## 2024-09-16 PROCEDURE — 1125F AMNT PAIN NOTED PAIN PRSNT: CPT | Performed by: NURSE PRACTITIONER

## 2024-09-16 PROCEDURE — 1159F MED LIST DOCD IN RCRD: CPT | Performed by: NURSE PRACTITIONER

## 2024-09-16 PROCEDURE — 1036F TOBACCO NON-USER: CPT | Performed by: NURSE PRACTITIONER

## 2024-09-16 PROCEDURE — 3075F SYST BP GE 130 - 139MM HG: CPT | Performed by: NURSE PRACTITIONER

## 2024-09-16 ASSESSMENT — ENCOUNTER SYMPTOMS
LOSS OF SENSATION IN FEET: 0
OCCASIONAL FEELINGS OF UNSTEADINESS: 0
DEPRESSION: 0

## 2024-09-16 ASSESSMENT — PATIENT HEALTH QUESTIONNAIRE - PHQ9
2. FEELING DOWN, DEPRESSED OR HOPELESS: NOT AT ALL
SUM OF ALL RESPONSES TO PHQ9 QUESTIONS 1 AND 2: 0
1. LITTLE INTEREST OR PLEASURE IN DOING THINGS: NOT AT ALL

## 2024-09-16 ASSESSMENT — PAIN SCALES - GENERAL
PAINLEVEL_OUTOF10: 3
PAINLEVEL: 3
PAINLEVEL_OUTOF10: 4

## 2024-09-16 ASSESSMENT — COLUMBIA-SUICIDE SEVERITY RATING SCALE - C-SSRS
2. HAVE YOU ACTUALLY HAD ANY THOUGHTS OF KILLING YOURSELF?: NO
6. HAVE YOU EVER DONE ANYTHING, STARTED TO DO ANYTHING, OR PREPARED TO DO ANYTHING TO END YOUR LIFE?: NO
1. IN THE PAST MONTH, HAVE YOU WISHED YOU WERE DEAD OR WISHED YOU COULD GO TO SLEEP AND NOT WAKE UP?: NO

## 2024-09-16 ASSESSMENT — PAIN - FUNCTIONAL ASSESSMENT
PAIN_FUNCTIONAL_ASSESSMENT: 0-10
PAIN_FUNCTIONAL_ASSESSMENT: 0-10

## 2024-09-16 ASSESSMENT — PAIN DESCRIPTION - DESCRIPTORS: DESCRIPTORS: ACHING
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
## 2024-09-20 ENCOUNTER — TREATMENT (OUTPATIENT)
Dept: PHYSICAL THERAPY | Facility: CLINIC | Age: 74
End: 2024-09-20
Payer: COMMERCIAL

## 2024-09-20 DIAGNOSIS — G44.319 ACUTE POST-TRAUMATIC HEADACHE, NOT INTRACTABLE: ICD-10-CM

## 2024-09-20 DIAGNOSIS — F40.298 PHONOPHOBIA: ICD-10-CM

## 2024-09-20 DIAGNOSIS — H53.9 VISUAL DISORDER: ICD-10-CM
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
## 2024-09-20 DIAGNOSIS — R42 DIZZINESS: ICD-10-CM

## 2024-09-20 DIAGNOSIS — M54.2 CERVICAL PAIN: ICD-10-CM

## 2024-09-20 DIAGNOSIS — R26.89 BALANCE DISORDER: ICD-10-CM

## 2024-09-20 DIAGNOSIS — S16.1XXA CERVICAL STRAIN, ACUTE, INITIAL ENCOUNTER: ICD-10-CM

## 2024-09-20 DIAGNOSIS — S06.0X0A CONCUSSION WITHOUT LOSS OF CONSCIOUSNESS, INITIAL ENCOUNTER: ICD-10-CM

## 2024-09-20 DIAGNOSIS — H53.149 PHOTOPHOBIA: ICD-10-CM

## 2024-09-20 DIAGNOSIS — M25.512 LEFT SHOULDER PAIN, UNSPECIFIED CHRONICITY: ICD-10-CM

## 2024-09-20 PROCEDURE — 97140 MANUAL THERAPY 1/> REGIONS: CPT | Mod: GP

## 2024-09-20 ASSESSMENT — PAIN SCALES - GENERAL: PAINLEVEL_OUTOF10: 5 - MODERATE PAIN

## 2024-09-20 ASSESSMENT — PAIN - FUNCTIONAL ASSESSMENT: PAIN_FUNCTIONAL_ASSESSMENT: 0-10
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
## 2024-09-24 ENCOUNTER — TREATMENT (OUTPATIENT)
Dept: PHYSICAL THERAPY | Facility: CLINIC | Age: 74
End: 2024-09-24
Payer: COMMERCIAL

## 2024-09-24 DIAGNOSIS — M25.512 LEFT SHOULDER PAIN, UNSPECIFIED CHRONICITY: ICD-10-CM

## 2024-09-24 DIAGNOSIS — G44.319 ACUTE POST-TRAUMATIC HEADACHE, NOT INTRACTABLE: ICD-10-CM

## 2024-09-24 DIAGNOSIS — F40.298 PHONOPHOBIA: ICD-10-CM
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
## 2024-09-24 DIAGNOSIS — H53.149 PHOTOPHOBIA: ICD-10-CM

## 2024-09-24 DIAGNOSIS — R42 DIZZINESS: ICD-10-CM

## 2024-09-24 DIAGNOSIS — S16.1XXA CERVICAL STRAIN, ACUTE, INITIAL ENCOUNTER: ICD-10-CM
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
## 2024-09-24 DIAGNOSIS — M54.2 CERVICAL PAIN: ICD-10-CM

## 2024-09-24 DIAGNOSIS — R26.89 BALANCE DISORDER: ICD-10-CM

## 2024-09-24 DIAGNOSIS — H53.9 VISUAL DISORDER: ICD-10-CM

## 2024-09-24 DIAGNOSIS — S06.0X0A CONCUSSION WITHOUT LOSS OF CONSCIOUSNESS, INITIAL ENCOUNTER: ICD-10-CM

## 2024-09-24 PROCEDURE — 97140 MANUAL THERAPY 1/> REGIONS: CPT | Mod: GP

## 2024-09-24 ASSESSMENT — PAIN SCALES - GENERAL: PAINLEVEL_OUTOF10: 4

## 2024-09-24 ASSESSMENT — PAIN - FUNCTIONAL ASSESSMENT: PAIN_FUNCTIONAL_ASSESSMENT: 0-10
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
## 2024-09-26 ASSESSMENT — PAIN - FUNCTIONAL ASSESSMENT: PAIN_FUNCTIONAL_ASSESSMENT: 0-10

## 2024-09-26 ASSESSMENT — PAIN SCALES - GENERAL: PAINLEVEL_OUTOF10: 4

## 2024-09-27 ENCOUNTER — TREATMENT (OUTPATIENT)
Dept: PHYSICAL THERAPY | Facility: CLINIC | Age: 74
End: 2024-09-27
Payer: COMMERCIAL

## 2024-09-27 DIAGNOSIS — S16.1XXA CERVICAL STRAIN, ACUTE, INITIAL ENCOUNTER: ICD-10-CM

## 2024-09-27 DIAGNOSIS — H53.9 VISUAL DISORDER: ICD-10-CM

## 2024-09-27 DIAGNOSIS — R42 DIZZINESS: ICD-10-CM

## 2024-09-27 DIAGNOSIS — F40.298 PHONOPHOBIA: ICD-10-CM
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
## 2024-09-27 DIAGNOSIS — G44.319 ACUTE POST-TRAUMATIC HEADACHE, NOT INTRACTABLE: ICD-10-CM

## 2024-09-27 DIAGNOSIS — M54.2 CERVICAL PAIN: ICD-10-CM

## 2024-09-27 DIAGNOSIS — R26.89 BALANCE DISORDER: ICD-10-CM

## 2024-09-27 DIAGNOSIS — M25.512 LEFT SHOULDER PAIN, UNSPECIFIED CHRONICITY: ICD-10-CM

## 2024-09-27 DIAGNOSIS — S06.0X0A CONCUSSION WITHOUT LOSS OF CONSCIOUSNESS, INITIAL ENCOUNTER: ICD-10-CM
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
## 2024-09-27 DIAGNOSIS — H53.149 PHOTOPHOBIA: ICD-10-CM

## 2024-09-27 PROCEDURE — 97026 INFRARED THERAPY: CPT | Mod: GP

## 2024-09-27 PROCEDURE — 97014 ELECTRIC STIMULATION THERAPY: CPT | Mod: GP

## 2024-09-27 PROCEDURE — 97140 MANUAL THERAPY 1/> REGIONS: CPT | Mod: GP
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
## 2024-10-01 ENCOUNTER — EVALUATION (OUTPATIENT)
Dept: SPEECH THERAPY | Facility: CLINIC | Age: 74
End: 2024-10-01
Payer: COMMERCIAL

## 2024-10-01 ENCOUNTER — TREATMENT (OUTPATIENT)
Dept: PHYSICAL THERAPY | Facility: CLINIC | Age: 74
End: 2024-10-01
Payer: COMMERCIAL

## 2024-10-01 DIAGNOSIS — M54.2 CERVICAL PAIN: ICD-10-CM

## 2024-10-01 DIAGNOSIS — H53.149 PHOTOPHOBIA: ICD-10-CM

## 2024-10-01 DIAGNOSIS — S06.0X0A CONCUSSION WITHOUT LOSS OF CONSCIOUSNESS, INITIAL ENCOUNTER: ICD-10-CM
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
## 2024-10-01 DIAGNOSIS — S16.1XXD CERVICAL STRAIN, SUBSEQUENT ENCOUNTER: ICD-10-CM

## 2024-10-01 DIAGNOSIS — F40.298 PHONOPHOBIA: ICD-10-CM

## 2024-10-01 DIAGNOSIS — S06.0X0D CONCUSSION WITHOUT LOSS OF CONSCIOUSNESS, SUBSEQUENT ENCOUNTER: ICD-10-CM

## 2024-10-01 DIAGNOSIS — M25.512 LEFT SHOULDER PAIN, UNSPECIFIED CHRONICITY: ICD-10-CM

## 2024-10-01 DIAGNOSIS — R26.89 BALANCE DISORDER: ICD-10-CM

## 2024-10-01 DIAGNOSIS — H53.9 VISUAL DISORDER: ICD-10-CM
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
## 2024-10-01 DIAGNOSIS — R48.8 OTHER SYMBOLIC DYSFUNCTION: Primary | ICD-10-CM

## 2024-10-01 DIAGNOSIS — G44.319 ACUTE POST-TRAUMATIC HEADACHE, NOT INTRACTABLE: ICD-10-CM

## 2024-10-01 DIAGNOSIS — R42 DIZZINESS: ICD-10-CM

## 2024-10-01 DIAGNOSIS — S16.1XXA CERVICAL STRAIN, ACUTE, INITIAL ENCOUNTER: ICD-10-CM

## 2024-10-01 PROCEDURE — 97110 THERAPEUTIC EXERCISES: CPT | Mod: GP | Performed by: PHYSICAL THERAPIST

## 2024-10-01 PROCEDURE — 92523 SPEECH SOUND LANG COMPREHEN: CPT | Mod: GN | Performed by: SPEECH-LANGUAGE PATHOLOGIST

## 2024-10-01 PROCEDURE — 97140 MANUAL THERAPY 1/> REGIONS: CPT | Mod: GP | Performed by: PHYSICAL THERAPIST
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
## 2024-10-04 ENCOUNTER — TREATMENT (OUTPATIENT)
Dept: PHYSICAL THERAPY | Facility: CLINIC | Age: 74
End: 2024-10-04
Payer: COMMERCIAL

## 2024-10-04 DIAGNOSIS — R42 DIZZINESS: ICD-10-CM

## 2024-10-04 DIAGNOSIS — M25.512 LEFT SHOULDER PAIN, UNSPECIFIED CHRONICITY: ICD-10-CM

## 2024-10-04 DIAGNOSIS — F40.298 PHONOPHOBIA: ICD-10-CM

## 2024-10-04 DIAGNOSIS — S16.1XXA CERVICAL STRAIN, ACUTE, INITIAL ENCOUNTER: ICD-10-CM

## 2024-10-04 DIAGNOSIS — G44.319 ACUTE POST-TRAUMATIC HEADACHE, NOT INTRACTABLE: ICD-10-CM

## 2024-10-04 DIAGNOSIS — H53.149 PHOTOPHOBIA: ICD-10-CM

## 2024-10-04 DIAGNOSIS — R26.89 BALANCE DISORDER: ICD-10-CM

## 2024-10-04 DIAGNOSIS — H53.9 VISUAL DISORDER: ICD-10-CM
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
## 2024-10-04 DIAGNOSIS — S06.0X0A CONCUSSION WITHOUT LOSS OF CONSCIOUSNESS, INITIAL ENCOUNTER: ICD-10-CM

## 2024-10-04 DIAGNOSIS — M54.2 CERVICAL PAIN: ICD-10-CM

## 2024-10-04 PROCEDURE — 97112 NEUROMUSCULAR REEDUCATION: CPT | Mod: GP

## 2024-10-04 PROCEDURE — 97140 MANUAL THERAPY 1/> REGIONS: CPT | Mod: GP

## 2024-10-04 PROCEDURE — 97014 ELECTRIC STIMULATION THERAPY: CPT | Mod: GP

## 2024-10-04 ASSESSMENT — PAIN - FUNCTIONAL ASSESSMENT: PAIN_FUNCTIONAL_ASSESSMENT: 0-10

## 2024-10-04 ASSESSMENT — PAIN SCALES - GENERAL: PAINLEVEL_OUTOF10: 3
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
## 2024-10-08 ENCOUNTER — TREATMENT (OUTPATIENT)
Dept: PHYSICAL THERAPY | Facility: CLINIC | Age: 74
End: 2024-10-08
Payer: COMMERCIAL

## 2024-10-08 ENCOUNTER — TREATMENT (OUTPATIENT)
Dept: SPEECH THERAPY | Facility: CLINIC | Age: 74
End: 2024-10-08
Payer: COMMERCIAL

## 2024-10-08 DIAGNOSIS — G44.319 ACUTE POST-TRAUMATIC HEADACHE, NOT INTRACTABLE: ICD-10-CM

## 2024-10-08 DIAGNOSIS — S16.1XXA CERVICAL STRAIN, ACUTE, INITIAL ENCOUNTER: ICD-10-CM

## 2024-10-08 DIAGNOSIS — R48.8 OTHER SYMBOLIC DYSFUNCTION: ICD-10-CM
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
## 2024-10-08 DIAGNOSIS — S16.1XXD CERVICAL STRAIN, SUBSEQUENT ENCOUNTER: ICD-10-CM
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
## 2024-10-08 DIAGNOSIS — S06.0X0A CONCUSSION WITHOUT LOSS OF CONSCIOUSNESS, INITIAL ENCOUNTER: ICD-10-CM

## 2024-10-08 DIAGNOSIS — F40.298 PHONOPHOBIA: ICD-10-CM

## 2024-10-08 DIAGNOSIS — M25.512 LEFT SHOULDER PAIN, UNSPECIFIED CHRONICITY: ICD-10-CM
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
## 2024-10-08 DIAGNOSIS — S06.0X0D CONCUSSION WITHOUT LOSS OF CONSCIOUSNESS, SUBSEQUENT ENCOUNTER: ICD-10-CM

## 2024-10-08 DIAGNOSIS — M54.2 CERVICAL PAIN: ICD-10-CM

## 2024-10-08 DIAGNOSIS — R26.89 BALANCE DISORDER: ICD-10-CM

## 2024-10-08 DIAGNOSIS — H53.149 PHOTOPHOBIA: ICD-10-CM

## 2024-10-08 DIAGNOSIS — R42 DIZZINESS: ICD-10-CM

## 2024-10-08 DIAGNOSIS — H53.9 VISUAL DISORDER: ICD-10-CM

## 2024-10-08 PROCEDURE — 97140 MANUAL THERAPY 1/> REGIONS: CPT | Mod: GP

## 2024-10-08 PROCEDURE — 92507 TX SP LANG VOICE COMM INDIV: CPT | Mod: GN | Performed by: SPEECH-LANGUAGE PATHOLOGIST

## 2024-10-08 ASSESSMENT — PAIN SCALES - GENERAL: PAINLEVEL_OUTOF10: 2

## 2024-10-08 ASSESSMENT — PAIN - FUNCTIONAL ASSESSMENT: PAIN_FUNCTIONAL_ASSESSMENT: 0-10
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
## 2024-10-11 ENCOUNTER — TREATMENT (OUTPATIENT)
Dept: PHYSICAL THERAPY | Facility: CLINIC | Age: 74
End: 2024-10-11
Payer: COMMERCIAL

## 2024-10-11 DIAGNOSIS — M25.512 LEFT SHOULDER PAIN, UNSPECIFIED CHRONICITY: ICD-10-CM

## 2024-10-11 DIAGNOSIS — S06.0X0A CONCUSSION WITHOUT LOSS OF CONSCIOUSNESS, INITIAL ENCOUNTER: ICD-10-CM

## 2024-10-11 DIAGNOSIS — F40.298 PHONOPHOBIA: ICD-10-CM

## 2024-10-11 DIAGNOSIS — S16.1XXD CERVICAL STRAIN, SUBSEQUENT ENCOUNTER: ICD-10-CM

## 2024-10-11 DIAGNOSIS — M54.2 CERVICAL PAIN: ICD-10-CM

## 2024-10-11 DIAGNOSIS — R26.89 BALANCE DISORDER: ICD-10-CM
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
## 2024-10-11 DIAGNOSIS — G44.319 ACUTE POST-TRAUMATIC HEADACHE, NOT INTRACTABLE: ICD-10-CM

## 2024-10-11 DIAGNOSIS — H53.9 VISUAL DISORDER: ICD-10-CM
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
## 2024-10-11 DIAGNOSIS — H53.149 PHOTOPHOBIA: ICD-10-CM

## 2024-10-11 DIAGNOSIS — S16.1XXA CERVICAL STRAIN, ACUTE, INITIAL ENCOUNTER: ICD-10-CM

## 2024-10-11 DIAGNOSIS — R42 DIZZINESS: ICD-10-CM
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
## 2024-10-11 PROCEDURE — 97140 MANUAL THERAPY 1/> REGIONS: CPT | Mod: GP,CQ

## 2024-10-11 PROCEDURE — 97110 THERAPEUTIC EXERCISES: CPT | Mod: GP,CQ

## 2024-10-11 ASSESSMENT — PAIN SCALES - GENERAL: PAINLEVEL_OUTOF10: 5 - MODERATE PAIN

## 2024-10-11 ASSESSMENT — PAIN - FUNCTIONAL ASSESSMENT: PAIN_FUNCTIONAL_ASSESSMENT: 0-10
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
## 2024-10-15 ENCOUNTER — TREATMENT (OUTPATIENT)
Dept: SPEECH THERAPY | Facility: CLINIC | Age: 74
End: 2024-10-15
Payer: COMMERCIAL

## 2024-10-15 ENCOUNTER — TREATMENT (OUTPATIENT)
Dept: PHYSICAL THERAPY | Facility: CLINIC | Age: 74
End: 2024-10-15
Payer: COMMERCIAL

## 2024-10-15 DIAGNOSIS — R48.8 OTHER SYMBOLIC DYSFUNCTION: ICD-10-CM

## 2024-10-15 DIAGNOSIS — H53.149 PHOTOPHOBIA: ICD-10-CM
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
## 2024-10-15 DIAGNOSIS — M54.2 CERVICAL PAIN: ICD-10-CM

## 2024-10-15 DIAGNOSIS — M25.512 LEFT SHOULDER PAIN, UNSPECIFIED CHRONICITY: ICD-10-CM

## 2024-10-15 DIAGNOSIS — R42 DIZZINESS: ICD-10-CM

## 2024-10-15 DIAGNOSIS — F40.298 PHONOPHOBIA: ICD-10-CM

## 2024-10-15 DIAGNOSIS — S06.0X0D CONCUSSION WITHOUT LOSS OF CONSCIOUSNESS, SUBSEQUENT ENCOUNTER: ICD-10-CM
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
## 2024-10-15 DIAGNOSIS — S06.0X0A CONCUSSION WITHOUT LOSS OF CONSCIOUSNESS, INITIAL ENCOUNTER: ICD-10-CM

## 2024-10-15 DIAGNOSIS — S16.1XXD CERVICAL STRAIN, SUBSEQUENT ENCOUNTER: ICD-10-CM

## 2024-10-15 DIAGNOSIS — S16.1XXA CERVICAL STRAIN, ACUTE, INITIAL ENCOUNTER: ICD-10-CM

## 2024-10-15 DIAGNOSIS — H53.9 VISUAL DISORDER: ICD-10-CM
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
## 2024-10-15 DIAGNOSIS — G44.319 ACUTE POST-TRAUMATIC HEADACHE, NOT INTRACTABLE: ICD-10-CM

## 2024-10-15 DIAGNOSIS — R26.89 BALANCE DISORDER: ICD-10-CM

## 2024-10-15 PROCEDURE — 97140 MANUAL THERAPY 1/> REGIONS: CPT | Mod: GP

## 2024-10-15 PROCEDURE — 97110 THERAPEUTIC EXERCISES: CPT | Mod: GP

## 2024-10-15 PROCEDURE — 92507 TX SP LANG VOICE COMM INDIV: CPT | Mod: GN | Performed by: SPEECH-LANGUAGE PATHOLOGIST

## 2024-10-15 ASSESSMENT — PAIN - FUNCTIONAL ASSESSMENT: PAIN_FUNCTIONAL_ASSESSMENT: 0-10

## 2024-10-15 ASSESSMENT — PAIN SCALES - GENERAL: PAINLEVEL_OUTOF10: 4
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
## 2024-10-17 ASSESSMENT — ENCOUNTER SYMPTOMS
RESPIRATORY NEGATIVE: 1
CONSTITUTIONAL NEGATIVE: 1
ARTHRALGIAS: 1
CARDIOVASCULAR NEGATIVE: 1
MYALGIAS: 1
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
## 2024-10-18 ENCOUNTER — APPOINTMENT (OUTPATIENT)
Dept: PHYSICAL THERAPY | Facility: CLINIC | Age: 74
End: 2024-10-18
Payer: MEDICARE

## 2024-10-21 ENCOUNTER — OFFICE VISIT (OUTPATIENT)
Dept: SPORTS MEDICINE | Facility: CLINIC | Age: 74
End: 2024-10-21
Payer: COMMERCIAL

## 2024-10-21 VITALS
DIASTOLIC BLOOD PRESSURE: 74 MMHG | HEART RATE: 81 BPM | BODY MASS INDEX: 32.2 KG/M2 | SYSTOLIC BLOOD PRESSURE: 130 MMHG | WEIGHT: 175 LBS | HEIGHT: 62 IN
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
## 2024-10-21 DIAGNOSIS — S06.0X0D CONCUSSION WITHOUT LOSS OF CONSCIOUSNESS, SUBSEQUENT ENCOUNTER: Primary | ICD-10-CM

## 2024-10-21 DIAGNOSIS — G44.319 ACUTE POST-TRAUMATIC HEADACHE, NOT INTRACTABLE: ICD-10-CM

## 2024-10-21 DIAGNOSIS — S16.1XXD CERVICAL STRAIN, SUBSEQUENT ENCOUNTER: ICD-10-CM

## 2024-10-21 DIAGNOSIS — M25.512 ACUTE PAIN OF LEFT SHOULDER: ICD-10-CM

## 2024-10-21 PROCEDURE — 3078F DIAST BP <80 MM HG: CPT | Performed by: NURSE PRACTITIONER

## 2024-10-21 PROCEDURE — 1125F AMNT PAIN NOTED PAIN PRSNT: CPT | Performed by: NURSE PRACTITIONER

## 2024-10-21 PROCEDURE — 3075F SYST BP GE 130 - 139MM HG: CPT | Performed by: NURSE PRACTITIONER

## 2024-10-21 PROCEDURE — 1036F TOBACCO NON-USER: CPT | Performed by: NURSE PRACTITIONER

## 2024-10-21 PROCEDURE — 99214 OFFICE O/P EST MOD 30 MIN: CPT | Performed by: NURSE PRACTITIONER

## 2024-10-21 PROCEDURE — 1159F MED LIST DOCD IN RCRD: CPT | Performed by: NURSE PRACTITIONER

## 2024-10-21 ASSESSMENT — ENCOUNTER SYMPTOMS
OCCASIONAL FEELINGS OF UNSTEADINESS: 0
LOSS OF SENSATION IN FEET: 0
DEPRESSION: 0

## 2024-10-21 ASSESSMENT — PAIN SCALES - GENERAL: PAINLEVEL_OUTOF10: 3

## 2024-10-22 ENCOUNTER — TREATMENT (OUTPATIENT)
Dept: SPEECH THERAPY | Facility: CLINIC | Age: 74
End: 2024-10-22
Payer: COMMERCIAL

## 2024-10-22 ENCOUNTER — TREATMENT (OUTPATIENT)
Dept: PHYSICAL THERAPY | Facility: CLINIC | Age: 74
End: 2024-10-22
Payer: COMMERCIAL
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
## 2024-10-22 DIAGNOSIS — S06.0X0D CONCUSSION WITHOUT LOSS OF CONSCIOUSNESS, SUBSEQUENT ENCOUNTER: ICD-10-CM
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
## 2024-10-22 DIAGNOSIS — S16.1XXD CERVICAL STRAIN, SUBSEQUENT ENCOUNTER: ICD-10-CM

## 2024-10-22 DIAGNOSIS — R48.8 OTHER SYMBOLIC DYSFUNCTION: ICD-10-CM

## 2024-10-22 DIAGNOSIS — R26.89 BALANCE DISORDER: ICD-10-CM

## 2024-10-22 DIAGNOSIS — S06.0X0A CONCUSSION WITHOUT LOSS OF CONSCIOUSNESS, INITIAL ENCOUNTER: ICD-10-CM

## 2024-10-22 DIAGNOSIS — H53.149 PHOTOPHOBIA: ICD-10-CM

## 2024-10-22 DIAGNOSIS — M54.2 CERVICAL PAIN: ICD-10-CM

## 2024-10-22 DIAGNOSIS — F40.298 PHONOPHOBIA: ICD-10-CM

## 2024-10-22 DIAGNOSIS — R42 DIZZINESS: ICD-10-CM

## 2024-10-22 DIAGNOSIS — G44.319 ACUTE POST-TRAUMATIC HEADACHE, NOT INTRACTABLE: ICD-10-CM

## 2024-10-22 DIAGNOSIS — S16.1XXA CERVICAL STRAIN, ACUTE, INITIAL ENCOUNTER: ICD-10-CM

## 2024-10-22 DIAGNOSIS — M25.512 LEFT SHOULDER PAIN, UNSPECIFIED CHRONICITY: ICD-10-CM
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
## 2024-10-22 DIAGNOSIS — H53.9 VISUAL DISORDER: ICD-10-CM

## 2024-10-22 PROCEDURE — 97014 ELECTRIC STIMULATION THERAPY: CPT | Mod: GP

## 2024-10-22 PROCEDURE — 97112 NEUROMUSCULAR REEDUCATION: CPT | Mod: GP

## 2024-10-22 PROCEDURE — 92507 TX SP LANG VOICE COMM INDIV: CPT | Mod: GN | Performed by: SPEECH-LANGUAGE PATHOLOGIST

## 2024-10-22 ASSESSMENT — PAIN SCALES - GENERAL: PAINLEVEL_OUTOF10: 4

## 2024-10-22 ASSESSMENT — PAIN - FUNCTIONAL ASSESSMENT: PAIN_FUNCTIONAL_ASSESSMENT: 0-10
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
## 2024-10-25 ENCOUNTER — TREATMENT (OUTPATIENT)
Dept: PHYSICAL THERAPY | Facility: CLINIC | Age: 74
End: 2024-10-25
Payer: COMMERCIAL
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
## 2024-10-25 DIAGNOSIS — S16.1XXD CERVICAL STRAIN, SUBSEQUENT ENCOUNTER: ICD-10-CM

## 2024-10-25 DIAGNOSIS — F40.298 PHONOPHOBIA: ICD-10-CM
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
## 2024-10-25 DIAGNOSIS — R42 DIZZINESS: ICD-10-CM
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
## 2024-10-25 DIAGNOSIS — H53.9 VISUAL DISORDER: ICD-10-CM

## 2024-10-25 DIAGNOSIS — M25.512 LEFT SHOULDER PAIN, UNSPECIFIED CHRONICITY: Primary | ICD-10-CM

## 2024-10-25 DIAGNOSIS — G44.319 ACUTE POST-TRAUMATIC HEADACHE, NOT INTRACTABLE: ICD-10-CM

## 2024-10-25 DIAGNOSIS — M54.2 CERVICAL PAIN: ICD-10-CM

## 2024-10-25 DIAGNOSIS — H53.149 PHOTOPHOBIA: ICD-10-CM

## 2024-10-25 DIAGNOSIS — S16.1XXA CERVICAL STRAIN, ACUTE, INITIAL ENCOUNTER: ICD-10-CM

## 2024-10-25 DIAGNOSIS — R26.89 BALANCE DISORDER: ICD-10-CM

## 2024-10-25 DIAGNOSIS — S06.0X0A CONCUSSION WITHOUT LOSS OF CONSCIOUSNESS, INITIAL ENCOUNTER: ICD-10-CM

## 2024-10-25 PROCEDURE — 97140 MANUAL THERAPY 1/> REGIONS: CPT | Mod: GP

## 2024-10-25 PROCEDURE — 97014 ELECTRIC STIMULATION THERAPY: CPT | Mod: GP

## 2024-10-25 ASSESSMENT — PAIN - FUNCTIONAL ASSESSMENT: PAIN_FUNCTIONAL_ASSESSMENT: 0-10

## 2024-10-25 ASSESSMENT — PAIN SCALES - GENERAL: PAINLEVEL_OUTOF10: 7
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
## 2024-10-29 ENCOUNTER — TREATMENT (OUTPATIENT)
Dept: SPEECH THERAPY | Facility: CLINIC | Age: 74
End: 2024-10-29
Payer: MEDICARE

## 2024-10-29 ENCOUNTER — TREATMENT (OUTPATIENT)
Dept: PHYSICAL THERAPY | Facility: CLINIC | Age: 74
End: 2024-10-29
Payer: MEDICARE

## 2024-10-29 DIAGNOSIS — G44.319 ACUTE POST-TRAUMATIC HEADACHE, NOT INTRACTABLE: ICD-10-CM
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
## 2024-10-29 DIAGNOSIS — M25.512 LEFT SHOULDER PAIN, UNSPECIFIED CHRONICITY: ICD-10-CM

## 2024-10-29 DIAGNOSIS — R26.89 BALANCE DISORDER: ICD-10-CM
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
## 2024-10-29 DIAGNOSIS — S16.1XXA CERVICAL STRAIN, ACUTE, INITIAL ENCOUNTER: ICD-10-CM

## 2024-10-29 DIAGNOSIS — M54.2 CERVICAL PAIN: ICD-10-CM

## 2024-10-29 DIAGNOSIS — H53.149 PHOTOPHOBIA: ICD-10-CM

## 2024-10-29 DIAGNOSIS — R42 DIZZINESS: ICD-10-CM

## 2024-10-29 DIAGNOSIS — H53.9 VISUAL DISORDER: ICD-10-CM

## 2024-10-29 DIAGNOSIS — F40.298 PHONOPHOBIA: ICD-10-CM

## 2024-10-29 DIAGNOSIS — R48.8 OTHER SYMBOLIC DYSFUNCTION: ICD-10-CM

## 2024-10-29 DIAGNOSIS — S06.0X0A CONCUSSION WITHOUT LOSS OF CONSCIOUSNESS, INITIAL ENCOUNTER: ICD-10-CM

## 2024-10-29 DIAGNOSIS — S06.0X0D CONCUSSION WITHOUT LOSS OF CONSCIOUSNESS, SUBSEQUENT ENCOUNTER: ICD-10-CM

## 2024-10-29 DIAGNOSIS — S16.1XXD CERVICAL STRAIN, SUBSEQUENT ENCOUNTER: ICD-10-CM
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
## 2024-10-29 PROCEDURE — 97140 MANUAL THERAPY 1/> REGIONS: CPT | Mod: GP

## 2024-10-29 PROCEDURE — 97014 ELECTRIC STIMULATION THERAPY: CPT | Mod: GP

## 2024-10-29 PROCEDURE — 97110 THERAPEUTIC EXERCISES: CPT | Mod: GP

## 2024-10-29 PROCEDURE — 92507 TX SP LANG VOICE COMM INDIV: CPT | Mod: GN | Performed by: SPEECH-LANGUAGE PATHOLOGIST

## 2024-10-29 ASSESSMENT — PAIN - FUNCTIONAL ASSESSMENT: PAIN_FUNCTIONAL_ASSESSMENT: 0-10

## 2024-10-30 ASSESSMENT — ENCOUNTER SYMPTOMS
RESPIRATORY NEGATIVE: 1
ARTHRALGIAS: 1
CONSTITUTIONAL NEGATIVE: 1
CARDIOVASCULAR NEGATIVE: 1
MYALGIAS: 1

## 2024-10-31 ENCOUNTER — OFFICE VISIT (OUTPATIENT)
Dept: SPORTS MEDICINE | Facility: CLINIC | Age: 74
End: 2024-10-31
Payer: MEDICARE

## 2024-10-31 VITALS
HEIGHT: 62 IN | HEART RATE: 71 BPM | WEIGHT: 175 LBS | DIASTOLIC BLOOD PRESSURE: 74 MMHG | SYSTOLIC BLOOD PRESSURE: 130 MMHG | BODY MASS INDEX: 32.2 KG/M2

## 2024-10-31 DIAGNOSIS — M75.52 BURSITIS OF LEFT SHOULDER: ICD-10-CM

## 2024-10-31 DIAGNOSIS — S43.52XA ACROMIOCLAVICULAR SPRAIN, LEFT, INITIAL ENCOUNTER: ICD-10-CM

## 2024-10-31 DIAGNOSIS — S43.402A SPRAIN OF LEFT SHOULDER, INITIAL ENCOUNTER: ICD-10-CM

## 2024-10-31 DIAGNOSIS — S49.92XA INJURY OF GLENOID LABRUM, LEFT, INITIAL ENCOUNTER: ICD-10-CM

## 2024-10-31 DIAGNOSIS — M54.12 RADICULOPATHY OF CERVICAL REGION: ICD-10-CM

## 2024-10-31 DIAGNOSIS — S46.012A ROTATOR CUFF STRAIN, LEFT, INITIAL ENCOUNTER: ICD-10-CM

## 2024-10-31 DIAGNOSIS — S46.812A TRAPEZIUS STRAIN, LEFT, INITIAL ENCOUNTER: ICD-10-CM

## 2024-10-31 PROBLEM — S49.90XA INJURY OF GLENOID LABRUM: Status: ACTIVE | Noted: 2024-10-31

## 2024-10-31 PROCEDURE — 1159F MED LIST DOCD IN RCRD: CPT | Performed by: NURSE PRACTITIONER

## 2024-10-31 PROCEDURE — 99214 OFFICE O/P EST MOD 30 MIN: CPT | Performed by: NURSE PRACTITIONER

## 2024-10-31 PROCEDURE — 3075F SYST BP GE 130 - 139MM HG: CPT | Performed by: NURSE PRACTITIONER

## 2024-10-31 PROCEDURE — 3078F DIAST BP <80 MM HG: CPT | Performed by: NURSE PRACTITIONER

## 2024-10-31 PROCEDURE — 1036F TOBACCO NON-USER: CPT | Performed by: NURSE PRACTITIONER

## 2024-10-31 PROCEDURE — 1125F AMNT PAIN NOTED PAIN PRSNT: CPT | Performed by: NURSE PRACTITIONER

## 2024-10-31 ASSESSMENT — ENCOUNTER SYMPTOMS
LOSS OF SENSATION IN FEET: 0
OCCASIONAL FEELINGS OF UNSTEADINESS: 0
DEPRESSION: 0

## 2024-10-31 ASSESSMENT — PAIN SCALES - GENERAL: PAINLEVEL_OUTOF10: 4

## 2024-11-01 ENCOUNTER — TREATMENT (OUTPATIENT)
Dept: PHYSICAL THERAPY | Facility: CLINIC | Age: 74
End: 2024-11-01
Payer: MEDICARE

## 2024-11-01 DIAGNOSIS — H53.9 VISUAL DISORDER: ICD-10-CM

## 2024-11-01 DIAGNOSIS — S06.0X0A CONCUSSION WITHOUT LOSS OF CONSCIOUSNESS, INITIAL ENCOUNTER: ICD-10-CM

## 2024-11-01 DIAGNOSIS — R42 DIZZINESS: ICD-10-CM

## 2024-11-01 DIAGNOSIS — M54.2 CERVICAL PAIN: ICD-10-CM
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
## 2024-11-01 DIAGNOSIS — S16.1XXD CERVICAL STRAIN, SUBSEQUENT ENCOUNTER: ICD-10-CM
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
## 2024-11-01 DIAGNOSIS — M25.512 LEFT SHOULDER PAIN, UNSPECIFIED CHRONICITY: ICD-10-CM

## 2024-11-01 DIAGNOSIS — S16.1XXA CERVICAL STRAIN, ACUTE, INITIAL ENCOUNTER: ICD-10-CM

## 2024-11-01 DIAGNOSIS — R26.89 BALANCE DISORDER: ICD-10-CM

## 2024-11-01 DIAGNOSIS — H53.149 PHOTOPHOBIA: ICD-10-CM

## 2024-11-01 DIAGNOSIS — F40.298 PHONOPHOBIA: ICD-10-CM

## 2024-11-01 DIAGNOSIS — G44.319 ACUTE POST-TRAUMATIC HEADACHE, NOT INTRACTABLE: ICD-10-CM

## 2024-11-01 PROCEDURE — 97140 MANUAL THERAPY 1/> REGIONS: CPT | Mod: GP

## 2024-11-01 PROCEDURE — 97014 ELECTRIC STIMULATION THERAPY: CPT | Mod: GP

## 2024-11-01 PROCEDURE — 97110 THERAPEUTIC EXERCISES: CPT | Mod: GP

## 2024-11-01 ASSESSMENT — PAIN - FUNCTIONAL ASSESSMENT: PAIN_FUNCTIONAL_ASSESSMENT: 0-10

## 2024-11-01 ASSESSMENT — PAIN SCALES - GENERAL: PAINLEVEL_OUTOF10: 4

## 2024-11-05 ENCOUNTER — APPOINTMENT (OUTPATIENT)
Dept: SPEECH THERAPY | Facility: CLINIC | Age: 74
End: 2024-11-05
Payer: COMMERCIAL

## 2024-11-08 ENCOUNTER — TREATMENT (OUTPATIENT)
Dept: PHYSICAL THERAPY | Facility: CLINIC | Age: 74
End: 2024-11-08
Payer: MEDICARE

## 2024-11-08 DIAGNOSIS — H53.9 VISUAL DISORDER: ICD-10-CM

## 2024-11-08 DIAGNOSIS — S16.1XXA CERVICAL STRAIN, ACUTE, INITIAL ENCOUNTER: ICD-10-CM

## 2024-11-08 DIAGNOSIS — H53.149 PHOTOPHOBIA: ICD-10-CM

## 2024-11-08 DIAGNOSIS — M54.2 CERVICAL PAIN: ICD-10-CM

## 2024-11-08 DIAGNOSIS — M25.512 LEFT SHOULDER PAIN, UNSPECIFIED CHRONICITY: ICD-10-CM
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
## 2024-11-08 DIAGNOSIS — R42 DIZZINESS: ICD-10-CM
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
## 2024-11-08 DIAGNOSIS — S16.1XXD CERVICAL STRAIN, SUBSEQUENT ENCOUNTER: ICD-10-CM

## 2024-11-08 DIAGNOSIS — R26.89 BALANCE DISORDER: ICD-10-CM

## 2024-11-08 DIAGNOSIS — S06.0X0A CONCUSSION WITHOUT LOSS OF CONSCIOUSNESS, INITIAL ENCOUNTER: ICD-10-CM

## 2024-11-08 DIAGNOSIS — G44.319 ACUTE POST-TRAUMATIC HEADACHE, NOT INTRACTABLE: ICD-10-CM

## 2024-11-08 DIAGNOSIS — F40.298 PHONOPHOBIA: ICD-10-CM

## 2024-11-08 PROCEDURE — 97014 ELECTRIC STIMULATION THERAPY: CPT | Mod: GP | Performed by: PHYSICAL THERAPIST

## 2024-11-08 PROCEDURE — 97110 THERAPEUTIC EXERCISES: CPT | Mod: GP | Performed by: PHYSICAL THERAPIST

## 2024-11-08 ASSESSMENT — PAIN - FUNCTIONAL ASSESSMENT: PAIN_FUNCTIONAL_ASSESSMENT: 0-10

## 2024-11-08 ASSESSMENT — PAIN SCALES - GENERAL: PAINLEVEL_OUTOF10: 5 - MODERATE PAIN
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
## 2024-11-12 ENCOUNTER — TREATMENT (OUTPATIENT)
Dept: SPEECH THERAPY | Facility: CLINIC | Age: 74
End: 2024-11-12
Payer: COMMERCIAL

## 2024-11-12 ENCOUNTER — TREATMENT (OUTPATIENT)
Dept: PHYSICAL THERAPY | Facility: CLINIC | Age: 74
End: 2024-11-12
Payer: COMMERCIAL

## 2024-11-12 DIAGNOSIS — M54.2 CERVICAL PAIN: ICD-10-CM

## 2024-11-12 DIAGNOSIS — R42 DIZZINESS: ICD-10-CM

## 2024-11-12 DIAGNOSIS — H53.149 PHOTOPHOBIA: ICD-10-CM

## 2024-11-12 DIAGNOSIS — G44.319 ACUTE POST-TRAUMATIC HEADACHE, NOT INTRACTABLE: ICD-10-CM
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
## 2024-11-12 DIAGNOSIS — M25.512 LEFT SHOULDER PAIN, UNSPECIFIED CHRONICITY: ICD-10-CM

## 2024-11-12 DIAGNOSIS — F40.298 PHONOPHOBIA: ICD-10-CM

## 2024-11-12 DIAGNOSIS — R48.8 OTHER SYMBOLIC DYSFUNCTION: ICD-10-CM

## 2024-11-12 DIAGNOSIS — S06.0X0A CONCUSSION WITHOUT LOSS OF CONSCIOUSNESS, INITIAL ENCOUNTER: ICD-10-CM

## 2024-11-12 DIAGNOSIS — S06.0X0D CONCUSSION WITHOUT LOSS OF CONSCIOUSNESS, SUBSEQUENT ENCOUNTER: ICD-10-CM
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
## 2024-11-12 DIAGNOSIS — S16.1XXD CERVICAL STRAIN, SUBSEQUENT ENCOUNTER: ICD-10-CM

## 2024-11-12 DIAGNOSIS — R26.89 BALANCE DISORDER: ICD-10-CM

## 2024-11-12 DIAGNOSIS — S16.1XXA CERVICAL STRAIN, ACUTE, INITIAL ENCOUNTER: ICD-10-CM

## 2024-11-12 DIAGNOSIS — H53.9 VISUAL DISORDER: ICD-10-CM

## 2024-11-12 PROCEDURE — 97110 THERAPEUTIC EXERCISES: CPT | Mod: GP

## 2024-11-12 PROCEDURE — 97014 ELECTRIC STIMULATION THERAPY: CPT | Mod: GP

## 2024-11-12 PROCEDURE — 92507 TX SP LANG VOICE COMM INDIV: CPT | Mod: GN | Performed by: SPEECH-LANGUAGE PATHOLOGIST

## 2024-11-12 ASSESSMENT — PAIN SCALES - GENERAL: PAINLEVEL_OUTOF10: 3

## 2024-11-12 ASSESSMENT — PAIN - FUNCTIONAL ASSESSMENT: PAIN_FUNCTIONAL_ASSESSMENT: 0-10
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
## 2024-11-13 ASSESSMENT — ENCOUNTER SYMPTOMS
CONSTITUTIONAL NEGATIVE: 1
RESPIRATORY NEGATIVE: 1
ARTHRALGIAS: 1
CARDIOVASCULAR NEGATIVE: 1
MYALGIAS: 1

## 2024-11-13 NOTE — PROGRESS NOTES
Established patient  History Of Present Illness  Beryl Sierra is a 74 y.o. female presenting for her follow up CONCUSSION evaluation. Since last visit in office, she states that she feels much better since last visit. Rates current pain as a 3/10 but states that it is all shoulder pain. Notes that her headaches are tolerable but would be at a 2/10. Pt notes that her last speech therapy is next Tuesday and her last physical therapy session is next Friday. Notes that her sessions as well as home exercises have been helping improve her symptoms.  We discussed treatment options.  After discussion we will clear patient from her concussion and she can continue with her speech and vestibular ocular therapy as scheduled until complete.  Patient can follow-up as needed for continued or further complaints of disability related to her concussion.  We reviewed patient MRI results in detail.  Patient verbalizes understanding of results.  We discussed treatment options and can have patient continue with her physical therapy for her shoulder and can follow-up in 6 weeks for reevaluation and we can consider further treatment options at that time with consideration of therapeutic versus regenerative injections as well as possible referral to orthopedic surgery.  Patient verbalizes understanding and agreement with plan of care.    Past Medical History  She has a past medical history of Abdominal pain (06/27/2022), Abnormal urinalysis (11/24/2020), Accidental fall (06/11/2024), Acute urinary tract infection (03/30/2021), Allergic, Anxiety, Arthritis, Cervical vertebral fusion (10/15/2020), Chest wall pain (06/11/2024), Cholelithiasis and cholecystitis without obstruction (09/04/2023), Contact with and (suspected) exposure to covid-19 (04/28/2022), Contusion of chest (06/11/2024), Diabetes mellitus (Multi) (Unknown), Dysuria (05/07/2018), Gall stone (11/27/2019), GERD (gastroesophageal reflux disease), Hypertension (Unknown), Injury  "of head (06/11/2024), Nausea (06/27/2022), Pain of foot (07/06/2020), and Urinary tract infection.    Surgical History  She has a past surgical history that includes Cholecystectomy and Tonsillectomy.     Social History  She reports that she has never smoked. She has never used smokeless tobacco. She reports that she does not drink alcohol and does not use drugs.    Family History  Family History   Problem Relation Name Age of Onset    Other (Shy-Drager) Mother      Tuberculosis Mother      Heart disease Father Enrique Sierra (Father)     Hypertension Father Enrique Urban (Father)     Atrial fibrillation Father Enrique Sierra (Father)     Abnormal EKG Father Enrique Sierra (Father)     Angina Father Enrique Sierra (Father)     Diabetes type II Father Enrique [facility] (Father)     Heart attack Father Enrique Sierra (Father)     Heart murmur Father Enrique Sierra (Father)     Cancer Other Uncle      Allergies  Vitamins a,c,e-zinc-copper; Aluminum aspirin; Amlodipine; Aspirin; Atenolol; Cefaclor; Cefuroxime axetil; Cephalexin; Codeine; Covid-19 vaccine, mrna, [id number], lnp-s (pfizer); Doxazosin; Doxycycline; Erythromycin; Escitalopram oxalate; Ezetimibe; Latex; Levofloxacin; Lisinopril; Metformin; Metoprolol; Metronidazole; Olvpu-xzuej-uyplnhd-pramoxine; Nitrofurantoin monohyd/m-cryst; Other; Penicillin; Simvastatin; Streptomycin; Sulfamethoxazole-trimethoprim; Tetracycline; Trimethoprim; Tropicamide; Vancomycin; Zolpidem tartrate; and Hydrochlorothiazide    Review of Systems  Review of Systems   Constitutional: Negative.    HENT: Negative.     Respiratory: Negative.     Cardiovascular: Negative.    Musculoskeletal:  Positive for arthralgias and myalgias.   All other systems reviewed and are negative.    Last Recorded Vitals  /80 (BP Location: Right arm, Patient Position: Sitting, BP Cuff Size: Adult)   Pulse 73   Ht 1.575 m (5' 2\")   Wt 79.4 kg (175 lb)   BMI 32.01 kg/m²      The , ARVIND ROSAS was present during today's visit " and not limited to physical examination!    Examination:  CERVICAL   TART Findings: Negative.  Tissue Texture Changes, Asymmetry, Restriction, Tenderness.   Ecchymosis/Bruising: Negative.   Percussion Test (CERVICAL): Negative.   Tuning Fork Test (CERVICAL): Negative.   Orientation (CERVICAL): Normal.     ROM (CERVICAL):  Negative. Forward Flexion, Extension, and Lateral Bending (Side Bending).     Muscle Strength:   Negative: Cervical Flexion, Extension, Left Rotation, Right Rotation, Left Lateral Flexion, Right Lateral Flexion, Trapezius (Shrug), Anterior Deltoid, Posterior Deltoid and Lateral Deltoid.          DTR/Neurological: Symmetrical  +2/+4 Biceps Reflex (C5)  +2/+4 Brachioradialis Reflex (C6)  +2/+4 Triceps Reflex (C7).     Sensation/Neurological Cervical:   Negative, Symmetrical:  C2: Lower jaw and back of head  C3: Upper neck and back of head  C4: Lower neck, upper shoulders, and upper chest  C5: Area of collarbones, lateral upper arms, and upper chest  C6: Lateral forearms, thumbs, anterior index finger, and lateral half of the middle finger  C7: Some of posterior index finger, medial half of middle finger, upper posterior back, and back of arms  C8: Ring finger, little finger, medial forearm, and posterior upper back.     Cervicle Spine Tests:  Lhermitte's Sign: Negative.   Spurling's Test (Atlanto-Axial Compression Test): Negative.   Reverse Spurling's Test: Negative.   Cervical Compression with Max Foraminal Compression Test: Negative.   Intervertebral Foramina Compression Test: Negative.   Flexion Compression Test: Negative.    Extension Compression Test: Negative.   Maximum Compression of the Intervertebral Foramina Test: Negative.   Forward Flexion Test: Negative.     Neurological:  CORTICAL FUNCTIONS: normal.   CRANIAL NERVES: normal  MOTOR STRENGTH: normal.   SENSORY: normal.   REFLEXES: normal.   PLANTARS: normal.   CEREBELLAR SIGNS: absent.   TREMORS: absent.   COORDINATION: finger-to-nose and  "rapid alternating movements were intact.   GAIT AND STATION: Within normal limits.   SPEECH: normal.   MUSCLE BULK: normal.   PRONATOR DRIFT: not present.   INVOLUNTARY MOVEMENTS: no tremors seen.           PNP Psych Exam:  APPEARANCE: appropriate.   BEHAVIOR/DEMEANOR: appropriate.   MOOD: cheerful.   SPEECH: normal.   THOUGHT PROCESSES: coherent.   THOUGHT CONTENT: appropriate.   MEMORY PROBLEMS: none.   INSIGHT: appropriate.   DISORIENTATION: none.        Ophthalmology:  VISUAL ACUITY No change noted by pt.   IRIS WNLs.   PUPILS normal, bilaterally.   VISION normal, bilaterally.     Nystagmus:  NO    Cranial Palpation Exam:  Paritel Bone Normal.   Occipital Bone Normal.   Frontal Bone Normal.      Concussion Examination    Symptoms:  Date of last concussion 24  Rate Symptoms over the past 24 hours: (0 to 6 symptom scale)  Headache: 1/6  Pressure in head: 0/6 -2  Neck Pain: 4/6 +1  Nausea or vomitin/6  Dizziness: 0/6  Blurred vision: 0/6  Balance problems: 1/6  Sensitivity to light: 0/6 -1  Sensitivity to noise: 0/6  Feeling slowed down: 5/6 +3  Feeling like in a \"fog\": 0/6 -2  Difficulty concentratin/6 -3  Difficulty rememberin/6  Fatigue or low energy: 4/6 -1  Confusion: 0/6 -3  Drowsiness: 3/6 -2  More emotional: 2/6 -3  Irritability: 3/6 -1  Sadness: /6 -1  Nervous or anxious: 0/6 -1  Sleep disturbances: 0/6 -2    Vestibular Ocular Motor Screening (VOMS): PERFORMED  Baseline Post Injury 1: Headache 1, Dizziness 1, Nausea 0, and Fogginess 1  Smooth Pursuits Post Injury 1: Headache 1, Dizziness 1, Nausea 0, and Fogginess 1  Horizontal Saccades  Post Inury 1: Headache 1, Dizziness 1, Nausea 0, and Fogginess 1  Vertical Saccades  Post Injury 1: Headache 1, Dizziness 1, Nausea 0, and Fogginess 1  Near Point Convergence  Post Injury 1: Headache 1, Dizziness 1, Nausea 0, and Fogginess 1  Vestibular Ocular Reflex (VOR) Horizontal Post Injury 1: Headache 1, Dizziness 1, Nausea 0, and Fogginess " 1  Vestibular Ocular Reflex (VOR) Veritical Post Injury 1: Headache 1, Dizziness 1, Nausea 0, and Fogginess 1  Visual Motion Sensitivity (VMS) Post Injury 1: Headache 1, Dizziness 1, Nausea 0, and Fogginess 1    Balance: Unable to perform with eyes closed  Foot tested: LEFT/RIGHT (ie: test the non-dominant foot)  Modified MKIAL: NOT PERFORMED  Double leg stance: ___ of 10  Tandem Stance: ____ of 10  Single leg stance: ____ of 10  Total errors: ___ of 10    Imaging and Diagnostics Review:  No new radiographs were obtained today.    Assessment   1. Concussion without loss of consciousness, subsequent encounter    2. Acute post-traumatic headache, not intractable    3. Cervical strain, subsequent encounter    4. Acute pain of left shoulder          Plan   Treatment or Intervention:  Once again discussed head injury and second impact syndrome in detail with the patient and/or parent(s) and/or legal guardian(s) to the level of their understanding at the time of this office.  Once again reviewed worsening signs and symptoms as well as what to look for with the patient and should these symptoms occur, advised to call the office and/or go to the emergency department immediately.  Recommendation over-the-counter supplements of EPA DHA Omega-3 fatty acids/Fish oilds take 3-4g a day, Coenzyme Q-10 take 300mg a day, Magnesium Oxide 500mg a day, as well as Gingko Biloba 120mg a day to help speed up the recovery from concussions and cut down on the signs and symptoms.  Patient may take OTC Tylenol Extra Strength, may take 1 tablet every 6-8 hours as needed with food.  Patient advised regarding the risk and/or potential adverse reactions and/or side effects of any prescribed medications along with any over-the-counter medications or any supplements used. Patient advised to seek immediate medical care if any adverse reactions occur. The patient and/or patients(s) parents(s) verbalized their understanding.  Recommend that the patient  begin relative rest which includes activities of daily living and reduced screen time immediately after injury and up to two days status post injury. The patient may return to light-intensity physical activity, such as walking that does not more than mildly exacerbate symptoms during the initial 24-48 hours following concussion  Prior to Phase 6 of Return to Moderate/Heavy job demands protocol, re-evaluation must be performed and show return to baseline or normal, whichever is available.   Continue with physical therapy as previously ordered for cervical spine and/or vestibular ocular and balance therapy  Continue with speech therapy.    Diagnostic studies:  Interpreted By:  Duran Grimes,   STUDY:  MRI of the  left shoulder without IV contrast;  11/14/2024 5:07 pm      INDICATION:  Signs/Symptoms:LEFT SHOULDER PAIN.      ,S46.012A Strain of muscle(s) and tendon(s) of the rotator cuff of  left shoulder, initial encounter,S49.92XA Unspecified injury of left  shoulder and upper arm, initial encounter,S46.812A Strain of other  muscles, fascia and tendons at shoulder and upper arm level, left  arm, initial encounter,S43.402A Unspecified sprain of left shoulder  joint, initial encounter,M54.12 Radiculopathy, cervical region,M75.52  Bursitis of left shoulder,S43.52XA Sprain of left acromioclavicular  joint, initial encounter      COMPARISON:  None      ACCESSION NUMBER(S):  [id number]      ORDERING CLINICIAN:  ELSA NICK      TECHNIQUE:  MR imaging of the left shoulder was obtained  without IV contrast.      FINDINGS:  ROTATOR CUFF TENDONS:  There is severe tendinopathy of the supraspinatus and infraspinatus  tendons without evidence of a tear. There is tendinopathy of the  subscapularis tendon as well. The teres minor tendon is intact and  unremarkable. There is no edema or fatty atrophy of the rotator cuff  musculature.      BICEPS TENDON AND ROTATOR INTERVAL:  The intra-articular long head biceps tendon is  intact and has a  normal course. The rotator interval is unremarkable.      JOINTS:  There is severe cartilage loss with osteophytosis and marrow reactive  changes in the acromioclavicular joint.      There is moderate cartilage loss in the glenohumeral joint      No evidence of significant joint effusion. No significant bursal  fluid collection.      LABRUM:  The labrum is grossly unremarkable given the limitations of a  non-arthrographic study.      OSSEOUS STRUCTURES:  No focal marrow replacing lesions are identified. There is no  fracture.      SOFT TISSUES:  The suprascapular nerve is intact at the suprascapular and  spinoglenoid notches.      IMPRESSION:  1.  Severe tendinopathy of the rotator cuff tendons without evidence  of a tear.  2. Severe acromioclavicular and moderate glenohumeral joint  osteoarthritis.          I personally reviewed the images/study and I agree with the findings  as stated. This study was interpreted at [place], [place].      MACRO:  None      Signed by: Duran Grimes 11/15/2024 4:58 AM  Dictation workstation:   [id number]    Activity Instructions, Restrictions, and Accommodations:      Consultations/Referrals:  Physical therapy    Follow-up:  Follow up as needed for CONCUSSION  Total appointment time _30_ minutes. Greater than 50% spent counseling patient on results of physical exam, treatment options as well as reasons for need for brain rest, warning signs to look for to avoid serious complications, need for PT and expected outcomes, as well as discussing possible medications.      ARVIND NEAL on 11/18/24 at 8:07 AM.     Baljit Liriano CNP
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
## 2024-11-13 NOTE — PATIENT INSTRUCTIONS
Once again discussed head injury and second impact syndrome in detail with the patient and/or parent(s) and/or legal guardian(s) to the level of their understanding at the time of this office.  Once again reviewed worsening signs and symptoms as well as what to look for with the patient and should these symptoms occur, advised to call the office and/or go to the emergency department immediately.  Recommendation over-the-counter supplements of EPA DHA Omega-3 fatty acids/Fish oilds take 3-4g a day, Coenzyme Q-10 take 300mg a day, Magnesium Oxide 500mg a day, as well as Gingko Biloba 120mg a day to help speed up the recovery from concussions and cut down on the signs and symptoms.  Patient may take OTC Tylenol Extra Strength, may take 1 tablet every 6-8 hours as needed with food.  Patient advised regarding the risk and/or potential adverse reactions and/or side effects of any prescribed medications along with any over-the-counter medications or any supplements used. Patient advised to seek immediate medical care if any adverse reactions occur. The patient and/or patients(s) parents(s) verbalized their understanding.  Recommend that the patient begin relative rest which includes activities of daily living and reduced screen time immediately after injury and up to two days status post injury. The patient may return to light-intensity physical activity, such as walking that does not more than mildly exacerbate symptoms during the initial 24-48 hours following concussion  Prior to Phase 6 of Return to Moderate/Heavy job demands protocol, re-evaluation must be performed and show return to baseline or normal, whichever is available.   Continue with physical therapy as previously ordered for cervical spine and/or vestibular ocular and balance therapy  Continue with speech therapy.  Follow up as needed for CONCUSSION.   Follow up 6 weeks for concussion

## 2024-11-14 ENCOUNTER — APPOINTMENT (OUTPATIENT)
Dept: PHYSICAL THERAPY | Facility: CLINIC | Age: 74
End: 2024-11-14
Payer: MEDICARE

## 2024-11-14 ENCOUNTER — TREATMENT (OUTPATIENT)
Dept: PHYSICAL THERAPY | Facility: CLINIC | Age: 74
End: 2024-11-14
Payer: COMMERCIAL

## 2024-11-14 ENCOUNTER — HOSPITAL ENCOUNTER (OUTPATIENT)
Dept: RADIOLOGY | Facility: HOSPITAL | Age: 74
Discharge: HOME | End: 2024-11-14
Payer: COMMERCIAL

## 2024-11-14 DIAGNOSIS — S16.1XXA CERVICAL STRAIN, ACUTE, INITIAL ENCOUNTER: ICD-10-CM

## 2024-11-14 DIAGNOSIS — M54.12 RADICULOPATHY OF CERVICAL REGION: ICD-10-CM

## 2024-11-14 DIAGNOSIS — S16.1XXD CERVICAL STRAIN, SUBSEQUENT ENCOUNTER: ICD-10-CM

## 2024-11-14 DIAGNOSIS — G44.319 ACUTE POST-TRAUMATIC HEADACHE, NOT INTRACTABLE: ICD-10-CM
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
## 2024-11-14 DIAGNOSIS — R42 DIZZINESS: ICD-10-CM
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
## 2024-11-14 DIAGNOSIS — H53.9 VISUAL DISORDER: ICD-10-CM

## 2024-11-14 DIAGNOSIS — S46.012A ROTATOR CUFF STRAIN, LEFT, INITIAL ENCOUNTER: ICD-10-CM

## 2024-11-14 DIAGNOSIS — H53.149 PHOTOPHOBIA: ICD-10-CM

## 2024-11-14 DIAGNOSIS — F40.298 PHONOPHOBIA: ICD-10-CM

## 2024-11-14 DIAGNOSIS — S49.92XA INJURY OF GLENOID LABRUM, LEFT, INITIAL ENCOUNTER: ICD-10-CM

## 2024-11-14 DIAGNOSIS — S43.402A SPRAIN OF LEFT SHOULDER, INITIAL ENCOUNTER: ICD-10-CM

## 2024-11-14 DIAGNOSIS — R26.89 BALANCE DISORDER: ICD-10-CM

## 2024-11-14 DIAGNOSIS — S43.52XA ACROMIOCLAVICULAR SPRAIN, LEFT, INITIAL ENCOUNTER: ICD-10-CM

## 2024-11-14 DIAGNOSIS — S06.0X0A CONCUSSION WITHOUT LOSS OF CONSCIOUSNESS, INITIAL ENCOUNTER: ICD-10-CM

## 2024-11-14 DIAGNOSIS — M54.2 CERVICAL PAIN: ICD-10-CM

## 2024-11-14 DIAGNOSIS — M25.512 LEFT SHOULDER PAIN, UNSPECIFIED CHRONICITY: ICD-10-CM

## 2024-11-14 DIAGNOSIS — S46.812A TRAPEZIUS STRAIN, LEFT, INITIAL ENCOUNTER: ICD-10-CM

## 2024-11-14 DIAGNOSIS — M75.52 BURSITIS OF LEFT SHOULDER: ICD-10-CM

## 2024-11-14 PROCEDURE — 97110 THERAPEUTIC EXERCISES: CPT | Mod: GP,CQ

## 2024-11-14 PROCEDURE — 73221 MRI JOINT UPR EXTREM W/O DYE: CPT | Mod: LT

## 2024-11-14 PROCEDURE — 97014 ELECTRIC STIMULATION THERAPY: CPT | Mod: GP,CQ

## 2024-11-14 ASSESSMENT — PAIN - FUNCTIONAL ASSESSMENT: PAIN_FUNCTIONAL_ASSESSMENT: 0-10

## 2024-11-14 ASSESSMENT — PAIN SCALES - GENERAL: PAINLEVEL_OUTOF10: 4

## 2024-11-18 ENCOUNTER — OFFICE VISIT (OUTPATIENT)
Dept: SPORTS MEDICINE | Facility: CLINIC | Age: 74
End: 2024-11-18
Payer: MEDICARE

## 2024-11-18 VITALS
DIASTOLIC BLOOD PRESSURE: 80 MMHG | HEART RATE: 73 BPM | SYSTOLIC BLOOD PRESSURE: 128 MMHG | HEIGHT: 62 IN | WEIGHT: 175 LBS | BODY MASS INDEX: 32.2 KG/M2

## 2024-11-18 DIAGNOSIS — S16.1XXD CERVICAL STRAIN, SUBSEQUENT ENCOUNTER: ICD-10-CM

## 2024-11-18 DIAGNOSIS — M25.512 ACUTE PAIN OF LEFT SHOULDER: ICD-10-CM

## 2024-11-18 DIAGNOSIS — M19.011 PRIMARY OSTEOARTHRITIS OF RIGHT SHOULDER: ICD-10-CM

## 2024-11-18 DIAGNOSIS — S06.0X0D CONCUSSION WITHOUT LOSS OF CONSCIOUSNESS, SUBSEQUENT ENCOUNTER: ICD-10-CM

## 2024-11-18 DIAGNOSIS — S46.012D ROTATOR CUFF STRAIN, LEFT, SUBSEQUENT ENCOUNTER: Primary | ICD-10-CM

## 2024-11-18 DIAGNOSIS — G44.319 ACUTE POST-TRAUMATIC HEADACHE, NOT INTRACTABLE: ICD-10-CM

## 2024-11-18 PROCEDURE — 3079F DIAST BP 80-89 MM HG: CPT | Performed by: NURSE PRACTITIONER

## 2024-11-18 PROCEDURE — 1036F TOBACCO NON-USER: CPT | Performed by: NURSE PRACTITIONER

## 2024-11-18 PROCEDURE — 99214 OFFICE O/P EST MOD 30 MIN: CPT | Performed by: NURSE PRACTITIONER

## 2024-11-18 PROCEDURE — 1159F MED LIST DOCD IN RCRD: CPT | Performed by: NURSE PRACTITIONER

## 2024-11-18 PROCEDURE — 1125F AMNT PAIN NOTED PAIN PRSNT: CPT | Performed by: NURSE PRACTITIONER

## 2024-11-18 PROCEDURE — 3074F SYST BP LT 130 MM HG: CPT | Performed by: NURSE PRACTITIONER

## 2024-11-18 ASSESSMENT — PAIN SCALES - GENERAL: PAINLEVEL_OUTOF10: 2

## 2024-11-18 ASSESSMENT — ENCOUNTER SYMPTOMS
OCCASIONAL FEELINGS OF UNSTEADINESS: 0
DEPRESSION: 0
LOSS OF SENSATION IN FEET: 0

## 2024-11-19 ENCOUNTER — TREATMENT (OUTPATIENT)
Dept: PHYSICAL THERAPY | Facility: CLINIC | Age: 74
End: 2024-11-19
Payer: MEDICARE

## 2024-11-19 ENCOUNTER — TREATMENT (OUTPATIENT)
Dept: SPEECH THERAPY | Facility: CLINIC | Age: 74
End: 2024-11-19
Payer: MEDICARE

## 2024-11-19 DIAGNOSIS — M25.512 LEFT SHOULDER PAIN, UNSPECIFIED CHRONICITY: ICD-10-CM
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
## 2024-11-19 DIAGNOSIS — H53.9 VISUAL DISORDER: ICD-10-CM

## 2024-11-19 DIAGNOSIS — R48.8 OTHER SYMBOLIC DYSFUNCTION: ICD-10-CM

## 2024-11-19 DIAGNOSIS — S06.0X0D CONCUSSION WITHOUT LOSS OF CONSCIOUSNESS, SUBSEQUENT ENCOUNTER: ICD-10-CM

## 2024-11-19 DIAGNOSIS — H53.149 PHOTOPHOBIA: ICD-10-CM

## 2024-11-19 DIAGNOSIS — S06.0X0A CONCUSSION WITHOUT LOSS OF CONSCIOUSNESS, INITIAL ENCOUNTER: ICD-10-CM

## 2024-11-19 DIAGNOSIS — S16.1XXD CERVICAL STRAIN, SUBSEQUENT ENCOUNTER: ICD-10-CM

## 2024-11-19 DIAGNOSIS — M54.2 CERVICAL PAIN: ICD-10-CM

## 2024-11-19 DIAGNOSIS — R42 DIZZINESS: ICD-10-CM

## 2024-11-19 DIAGNOSIS — G44.319 ACUTE POST-TRAUMATIC HEADACHE, NOT INTRACTABLE: ICD-10-CM

## 2024-11-19 DIAGNOSIS — R26.89 BALANCE DISORDER: ICD-10-CM

## 2024-11-19 DIAGNOSIS — S16.1XXA CERVICAL STRAIN, ACUTE, INITIAL ENCOUNTER: ICD-10-CM

## 2024-11-19 DIAGNOSIS — F40.298 PHONOPHOBIA: ICD-10-CM

## 2024-11-19 PROCEDURE — 97014 ELECTRIC STIMULATION THERAPY: CPT | Mod: GP | Performed by: PHYSICAL THERAPIST

## 2024-11-19 PROCEDURE — 97110 THERAPEUTIC EXERCISES: CPT | Mod: GP | Performed by: PHYSICAL THERAPIST

## 2024-11-19 PROCEDURE — 92507 TX SP LANG VOICE COMM INDIV: CPT | Mod: GN | Performed by: SPEECH-LANGUAGE PATHOLOGIST

## 2024-11-19 ASSESSMENT — PAIN - FUNCTIONAL ASSESSMENT: PAIN_FUNCTIONAL_ASSESSMENT: 0-10

## 2024-11-19 ASSESSMENT — PAIN SCALES - GENERAL: PAINLEVEL_OUTOF10: 2
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
## 2024-11-21 ENCOUNTER — APPOINTMENT (OUTPATIENT)
Dept: PHYSICAL THERAPY | Facility: CLINIC | Age: 74
End: 2024-11-21
Payer: MEDICARE

## 2024-11-22 ENCOUNTER — TELEPHONE (OUTPATIENT)
Dept: PRIMARY CARE | Facility: CLINIC | Age: 74
End: 2024-11-22
Payer: COMMERCIAL

## 2024-11-22 DIAGNOSIS — Z00.00 ROUTINE GENERAL MEDICAL EXAMINATION AT HEALTH CARE FACILITY: ICD-10-CM

## 2024-11-22 DIAGNOSIS — E78.5 HYPERLIPIDEMIA, UNSPECIFIED HYPERLIPIDEMIA TYPE: ICD-10-CM

## 2024-11-22 DIAGNOSIS — E11.65 TYPE 2 DIABETES MELLITUS WITH HYPERGLYCEMIA, WITHOUT LONG-TERM CURRENT USE OF INSULIN: ICD-10-CM

## 2024-11-22 DIAGNOSIS — I10 PRIMARY HYPERTENSION: ICD-10-CM

## 2024-11-24 ENCOUNTER — APPOINTMENT (OUTPATIENT)
Dept: RADIOLOGY | Facility: HOSPITAL | Age: 74
End: 2024-11-24
Payer: COMMERCIAL

## 2024-11-24 ENCOUNTER — APPOINTMENT (OUTPATIENT)
Dept: CARDIOLOGY | Facility: HOSPITAL | Age: 74
End: 2024-11-24
Payer: COMMERCIAL

## 2024-11-24 ENCOUNTER — HOSPITAL ENCOUNTER (EMERGENCY)
Facility: HOSPITAL | Age: 74
Discharge: HOME | End: 2024-11-24
Attending: STUDENT IN AN ORGANIZED HEALTH CARE EDUCATION/TRAINING PROGRAM
Payer: COMMERCIAL

## 2024-11-24 VITALS
RESPIRATION RATE: 19 BRPM | HEART RATE: 64 BPM | HEIGHT: 62 IN | OXYGEN SATURATION: 97 % | DIASTOLIC BLOOD PRESSURE: 74 MMHG | BODY MASS INDEX: 33.71 KG/M2 | TEMPERATURE: 98.2 F | WEIGHT: 183.2 LBS | SYSTOLIC BLOOD PRESSURE: 179 MMHG

## 2024-11-24 DIAGNOSIS — F41.9 ANXIETY: ICD-10-CM

## 2024-11-24 DIAGNOSIS — R42 DIZZINESS: ICD-10-CM

## 2024-11-24 DIAGNOSIS — N30.00 ACUTE CYSTITIS WITHOUT HEMATURIA: Primary | ICD-10-CM

## 2024-11-24 LAB
ALBUMIN SERPL BCP-MCNC: 4 G/DL (ref 3.4–5)
ALP SERPL-CCNC: 83 U/L (ref 33–136)
ALT SERPL W P-5'-P-CCNC: 13 U/L (ref 7–45)
ANION GAP BLDV CALCULATED.4IONS-SCNC: 10 MMOL/L (ref 10–25)
ANION GAP SERPL CALCULATED.3IONS-SCNC: 10 MMOL/L (ref 10–20)
APPEARANCE UR: ABNORMAL
AST SERPL W P-5'-P-CCNC: 16 U/L (ref 9–39)
B-OH-BUTYR SERPL-SCNC: 0.03 MMOL/L (ref 0.02–0.27)
BACTERIA #/AREA URNS AUTO: ABNORMAL /HPF
BASE EXCESS BLDV CALC-SCNC: 0.7 MMOL/L (ref -2–3)
BASOPHILS # BLD AUTO: 0.02 X10*3/UL (ref 0–0.1)
BASOPHILS NFR BLD AUTO: 0.3 %
BILIRUB SERPL-MCNC: 0.4 MG/DL (ref 0–1.2)
BILIRUB UR STRIP.AUTO-MCNC: NEGATIVE MG/DL
BODY TEMPERATURE: 37 DEGREES CELSIUS
BUN SERPL-MCNC: 15 MG/DL (ref 6–23)
CA-I BLDV-SCNC: 1.24 MMOL/L (ref 1.1–1.33)
CALCIUM SERPL-MCNC: 9 MG/DL (ref 8.6–10.3)
CARDIAC TROPONIN I PNL SERPL HS: 3 NG/L (ref 0–13)
CARDIAC TROPONIN I PNL SERPL HS: 3 NG/L (ref 0–13)
CHLORIDE BLDV-SCNC: 106 MMOL/L (ref 98–107)
CHLORIDE SERPL-SCNC: 107 MMOL/L (ref 98–107)
CO2 SERPL-SCNC: 25 MMOL/L (ref 21–32)
COLOR UR: ABNORMAL
CREAT SERPL-MCNC: 0.85 MG/DL (ref 0.5–1.05)
EGFRCR SERPLBLD CKD-EPI 2021: 71 ML/MIN/1.73M*2
EOSINOPHIL # BLD AUTO: 0.22 X10*3/UL (ref 0–0.4)
EOSINOPHIL NFR BLD AUTO: 3.7 %
ERYTHROCYTE [DISTWIDTH] IN BLOOD BY AUTOMATED COUNT: 12.4 % (ref 11.5–14.5)
GLUCOSE BLD MANUAL STRIP-MCNC: 210 MG/DL (ref 74–99)
GLUCOSE BLDV-MCNC: 225 MG/DL (ref 74–99)
GLUCOSE SERPL-MCNC: 218 MG/DL (ref 74–99)
GLUCOSE UR STRIP.AUTO-MCNC: ABNORMAL MG/DL
HCO3 BLDV-SCNC: 25.4 MMOL/L (ref 22–26)
HCT VFR BLD AUTO: 42.8 % (ref 36–46)
HCT VFR BLD EST: 42 % (ref 36–46)
HGB BLD-MCNC: 14.8 G/DL (ref 12–16)
HGB BLDV-MCNC: 14 G/DL (ref 12–16)
IMM GRANULOCYTES # BLD AUTO: 0.01 X10*3/UL (ref 0–0.5)
IMM GRANULOCYTES NFR BLD AUTO: 0.2 % (ref 0–0.9)
INHALED O2 CONCENTRATION: 0 %
KETONES UR STRIP.AUTO-MCNC: NEGATIVE MG/DL
LACTATE BLDV-SCNC: 1.4 MMOL/L (ref 0.4–2)
LEUKOCYTE ESTERASE UR QL STRIP.AUTO: ABNORMAL
LYMPHOCYTES # BLD AUTO: 1.35 X10*3/UL (ref 0.8–3)
LYMPHOCYTES NFR BLD AUTO: 22.5 %
MCH RBC QN AUTO: 31.9 PG (ref 26–34)
MCHC RBC AUTO-ENTMCNC: 34.6 G/DL (ref 32–36)
MCV RBC AUTO: 92 FL (ref 80–100)
MONOCYTES # BLD AUTO: 0.27 X10*3/UL (ref 0.05–0.8)
MONOCYTES NFR BLD AUTO: 4.5 %
NEUTROPHILS # BLD AUTO: 4.14 X10*3/UL (ref 1.6–5.5)
NEUTROPHILS NFR BLD AUTO: 68.8 %
NITRITE UR QL STRIP.AUTO: NEGATIVE
NRBC BLD-RTO: 0 /100 WBCS (ref 0–0)
OXYHGB MFR BLDV: 76.3 % (ref 45–75)
PCO2 BLDV: 40 MM HG (ref 41–51)
PH BLDV: 7.41 PH (ref 7.33–7.43)
PH UR STRIP.AUTO: 5 [PH]
PLATELET # BLD AUTO: 172 X10*3/UL (ref 150–450)
PO2 BLDV: 45 MM HG (ref 35–45)
POTASSIUM BLDV-SCNC: 4.1 MMOL/L (ref 3.5–5.3)
POTASSIUM SERPL-SCNC: 3.8 MMOL/L (ref 3.5–5.3)
PROT SERPL-MCNC: 6.6 G/DL (ref 6.4–8.2)
PROT UR STRIP.AUTO-MCNC: NEGATIVE MG/DL
RBC # BLD AUTO: 4.64 X10*6/UL (ref 4–5.2)
RBC # UR STRIP.AUTO: NEGATIVE /UL
RBC #/AREA URNS AUTO: ABNORMAL /HPF
SAO2 % BLDV: 78 % (ref 45–75)
SARS-COV-2 RNA RESP QL NAA+PROBE: NOT DETECTED
SODIUM BLDV-SCNC: 137 MMOL/L (ref 136–145)
SODIUM SERPL-SCNC: 138 MMOL/L (ref 136–145)
SP GR UR STRIP.AUTO: 1
SQUAMOUS #/AREA URNS AUTO: ABNORMAL /HPF
UROBILINOGEN UR STRIP.AUTO-MCNC: NORMAL MG/DL
WBC # BLD AUTO: 6 X10*3/UL (ref 4.4–11.3)
WBC #/AREA URNS AUTO: ABNORMAL /HPF

## 2024-11-24 PROCEDURE — 96375 TX/PRO/DX INJ NEW DRUG ADDON: CPT

## 2024-11-24 PROCEDURE — 2500000002 HC RX 250 W HCPCS SELF ADMINISTERED DRUGS (ALT 637 FOR MEDICARE OP, ALT 636 FOR OP/ED): Mod: MUE | Performed by: CLINICAL NURSE SPECIALIST

## 2024-11-24 PROCEDURE — 2500000004 HC RX 250 GENERAL PHARMACY W/ HCPCS (ALT 636 FOR OP/ED): Performed by: CLINICAL NURSE SPECIALIST

## 2024-11-24 PROCEDURE — 71046 X-RAY EXAM CHEST 2 VIEWS: CPT | Performed by: RADIOLOGY

## 2024-11-24 PROCEDURE — 96365 THER/PROPH/DIAG IV INF INIT: CPT

## 2024-11-24 PROCEDURE — 70450 CT HEAD/BRAIN W/O DYE: CPT

## 2024-11-24 PROCEDURE — 36415 COLL VENOUS BLD VENIPUNCTURE: CPT | Performed by: CLINICAL NURSE SPECIALIST

## 2024-11-24 PROCEDURE — 96366 THER/PROPH/DIAG IV INF ADDON: CPT

## 2024-11-24 PROCEDURE — 82010 KETONE BODYS QUAN: CPT | Performed by: CLINICAL NURSE SPECIALIST

## 2024-11-24 PROCEDURE — 84484 ASSAY OF TROPONIN QUANT: CPT | Performed by: CLINICAL NURSE SPECIALIST

## 2024-11-24 PROCEDURE — 70450 CT HEAD/BRAIN W/O DYE: CPT | Performed by: RADIOLOGY

## 2024-11-24 PROCEDURE — 82947 ASSAY GLUCOSE BLOOD QUANT: CPT | Mod: 59

## 2024-11-24 PROCEDURE — 81001 URINALYSIS AUTO W/SCOPE: CPT | Performed by: CLINICAL NURSE SPECIALIST

## 2024-11-24 PROCEDURE — 85025 COMPLETE CBC W/AUTO DIFF WBC: CPT | Performed by: CLINICAL NURSE SPECIALIST

## 2024-11-24 PROCEDURE — 99285 EMERGENCY DEPT VISIT HI MDM: CPT | Mod: 25

## 2024-11-24 PROCEDURE — 71046 X-RAY EXAM CHEST 2 VIEWS: CPT

## 2024-11-24 PROCEDURE — 84132 ASSAY OF SERUM POTASSIUM: CPT | Performed by: CLINICAL NURSE SPECIALIST

## 2024-11-24 PROCEDURE — 93005 ELECTROCARDIOGRAM TRACING: CPT

## 2024-11-24 PROCEDURE — 82947 ASSAY GLUCOSE BLOOD QUANT: CPT

## 2024-11-24 PROCEDURE — 87086 URINE CULTURE/COLONY COUNT: CPT | Mod: TRILAB | Performed by: CLINICAL NURSE SPECIALIST

## 2024-11-24 PROCEDURE — 87635 SARS-COV-2 COVID-19 AMP PRB: CPT | Performed by: CLINICAL NURSE SPECIALIST

## 2024-11-24 RX ORDER — HYDROXYZINE PAMOATE 50 MG/1
50 CAPSULE ORAL ONCE
Status: COMPLETED | OUTPATIENT
Start: 2024-11-24 | End: 2024-11-24

## 2024-11-24 RX ORDER — MECLIZINE HYDROCHLORIDE 25 MG/1
25 TABLET ORAL ONCE
Status: COMPLETED | OUTPATIENT
Start: 2024-11-24 | End: 2024-11-24

## 2024-11-24 RX ORDER — CIPROFLOXACIN 2 MG/ML
400 INJECTION, SOLUTION INTRAVENOUS ONCE
Status: COMPLETED | OUTPATIENT
Start: 2024-11-24 | End: 2024-11-24

## 2024-11-24 RX ORDER — ONDANSETRON HYDROCHLORIDE 2 MG/ML
4 INJECTION, SOLUTION INTRAVENOUS ONCE
Status: COMPLETED | OUTPATIENT
Start: 2024-11-24 | End: 2024-11-24

## 2024-11-24 RX ORDER — CIPROFLOXACIN 500 MG/1
500 TABLET ORAL 2 TIMES DAILY
Qty: 6 TABLET | Refills: 0 | Status: SHIPPED | OUTPATIENT
Start: 2024-11-24 | End: 2024-11-27

## 2024-11-24 ASSESSMENT — COLUMBIA-SUICIDE SEVERITY RATING SCALE - C-SSRS
1. IN THE PAST MONTH, HAVE YOU WISHED YOU WERE DEAD OR WISHED YOU COULD GO TO SLEEP AND NOT WAKE UP?: NO
2. HAVE YOU ACTUALLY HAD ANY THOUGHTS OF KILLING YOURSELF?: NO
6. HAVE YOU EVER DONE ANYTHING, STARTED TO DO ANYTHING, OR PREPARED TO DO ANYTHING TO END YOUR LIFE?: NO

## 2024-11-24 ASSESSMENT — PAIN SCALES - GENERAL
PAINLEVEL_OUTOF10: 0 - NO PAIN
PAINLEVEL_OUTOF10: 0 - NO PAIN

## 2024-11-24 ASSESSMENT — PAIN - FUNCTIONAL ASSESSMENT: PAIN_FUNCTIONAL_ASSESSMENT: 0-10

## 2024-11-24 NOTE — Clinical Note
Patient discharged home, discharge instructions, follow ups and medications noted. No questions or concerns noted

## 2024-11-24 NOTE — DISCHARGE INSTRUCTIONS
Increase fluids  Add cranberry juice to diet  Avoid sexual activity till symptom-free treatment is complete  Take antibiotic until completed first dose given in the emergency department take tonight before bed use with caution  Follow-up with primary care physician in 2 days for reevaluation and culture results monitor for worsening signs and symptoms of infection  Continue with your meclizine and vestibular rehab concussion rehab.  Follow-up with primary care physician in 2 days for reevaluation return with any worsening symptoms or concerns
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
## 2024-11-25 LAB
ATRIAL RATE: 73 BPM
P AXIS: -14 DEGREES
P OFFSET: 182 MS
P ONSET: 139 MS
PR INTERVAL: 150 MS
Q ONSET: 214 MS
QRS COUNT: 12 BEATS
QRS DURATION: 78 MS
QT INTERVAL: 376 MS
QTC CALCULATION(BAZETT): 414 MS
QTC FREDERICIA: 401 MS
R AXIS: -26 DEGREES
T AXIS: 5 DEGREES
T OFFSET: 402 MS
VENTRICULAR RATE: 73 BPM

## 2024-11-26 ENCOUNTER — TREATMENT (OUTPATIENT)
Dept: SPEECH THERAPY | Facility: CLINIC | Age: 74
End: 2024-11-26
Payer: MEDICARE

## 2024-11-26 DIAGNOSIS — S06.0X0D CONCUSSION WITHOUT LOSS OF CONSCIOUSNESS, SUBSEQUENT ENCOUNTER: ICD-10-CM

## 2024-11-26 DIAGNOSIS — R48.8 OTHER SYMBOLIC DYSFUNCTION: ICD-10-CM

## 2024-11-26 LAB — BACTERIA UR CULT: NORMAL

## 2024-11-26 PROCEDURE — 92507 TX SP LANG VOICE COMM INDIV: CPT | Mod: GN | Performed by: SPEECH-LANGUAGE PATHOLOGIST
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
## 2024-11-27 ENCOUNTER — TREATMENT (OUTPATIENT)
Dept: PHYSICAL THERAPY | Facility: CLINIC | Age: 74
End: 2024-11-27
Payer: MEDICARE

## 2024-11-27 DIAGNOSIS — M54.2 CERVICAL PAIN: ICD-10-CM
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
## 2024-11-27 DIAGNOSIS — R26.89 BALANCE DISORDER: ICD-10-CM

## 2024-11-27 DIAGNOSIS — H53.9 VISUAL DISORDER: ICD-10-CM

## 2024-11-27 DIAGNOSIS — H53.149 PHOTOPHOBIA: ICD-10-CM

## 2024-11-27 DIAGNOSIS — S06.0X0A CONCUSSION WITHOUT LOSS OF CONSCIOUSNESS, INITIAL ENCOUNTER: ICD-10-CM

## 2024-11-27 DIAGNOSIS — G44.319 ACUTE POST-TRAUMATIC HEADACHE, NOT INTRACTABLE: ICD-10-CM

## 2024-11-27 DIAGNOSIS — F40.298 PHONOPHOBIA: ICD-10-CM
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
## 2024-11-27 DIAGNOSIS — S16.1XXA CERVICAL STRAIN, ACUTE, INITIAL ENCOUNTER: ICD-10-CM

## 2024-11-27 DIAGNOSIS — S16.1XXD CERVICAL STRAIN, SUBSEQUENT ENCOUNTER: ICD-10-CM

## 2024-11-27 DIAGNOSIS — R42 DIZZINESS: ICD-10-CM

## 2024-11-27 DIAGNOSIS — M25.512 LEFT SHOULDER PAIN, UNSPECIFIED CHRONICITY: ICD-10-CM

## 2024-11-27 PROCEDURE — 97014 ELECTRIC STIMULATION THERAPY: CPT | Mod: GP | Performed by: PHYSICAL THERAPIST

## 2024-11-27 PROCEDURE — 97112 NEUROMUSCULAR REEDUCATION: CPT | Mod: GP | Performed by: PHYSICAL THERAPIST

## 2024-11-27 PROCEDURE — 97110 THERAPEUTIC EXERCISES: CPT | Mod: GP | Performed by: PHYSICAL THERAPIST

## 2024-11-27 ASSESSMENT — PAIN - FUNCTIONAL ASSESSMENT: PAIN_FUNCTIONAL_ASSESSMENT: 0-10

## 2024-11-27 ASSESSMENT — PAIN SCALES - GENERAL: PAINLEVEL_OUTOF10: 2
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
## 2024-11-29 ENCOUNTER — TREATMENT (OUTPATIENT)
Dept: PHYSICAL THERAPY | Facility: CLINIC | Age: 74
End: 2024-11-29
Payer: MEDICARE

## 2024-11-29 DIAGNOSIS — S16.1XXD CERVICAL STRAIN, SUBSEQUENT ENCOUNTER: ICD-10-CM

## 2024-11-29 DIAGNOSIS — H53.9 VISUAL DISORDER: ICD-10-CM

## 2024-11-29 DIAGNOSIS — G44.319 ACUTE POST-TRAUMATIC HEADACHE, NOT INTRACTABLE: ICD-10-CM

## 2024-11-29 DIAGNOSIS — R42 DIZZINESS: ICD-10-CM

## 2024-11-29 DIAGNOSIS — H53.149 PHOTOPHOBIA: ICD-10-CM

## 2024-11-29 DIAGNOSIS — M54.2 CERVICAL PAIN: ICD-10-CM

## 2024-11-29 DIAGNOSIS — R26.89 BALANCE DISORDER: ICD-10-CM

## 2024-11-29 DIAGNOSIS — F40.298 PHONOPHOBIA: ICD-10-CM
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
## 2024-11-29 DIAGNOSIS — S06.0X0A CONCUSSION WITHOUT LOSS OF CONSCIOUSNESS, INITIAL ENCOUNTER: ICD-10-CM

## 2024-11-29 DIAGNOSIS — M25.512 LEFT SHOULDER PAIN, UNSPECIFIED CHRONICITY: ICD-10-CM

## 2024-11-29 DIAGNOSIS — S16.1XXA CERVICAL STRAIN, ACUTE, INITIAL ENCOUNTER: ICD-10-CM
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
## 2024-11-29 PROCEDURE — 97014 ELECTRIC STIMULATION THERAPY: CPT | Mod: GP

## 2024-11-29 PROCEDURE — 97110 THERAPEUTIC EXERCISES: CPT | Mod: GP

## 2024-11-29 ASSESSMENT — PAIN SCALES - GENERAL: PAINLEVEL_OUTOF10: 2

## 2024-11-29 ASSESSMENT — PAIN - FUNCTIONAL ASSESSMENT: PAIN_FUNCTIONAL_ASSESSMENT: 0-10
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
## 2024-12-02 ENCOUNTER — APPOINTMENT (OUTPATIENT)
Dept: PRIMARY CARE | Facility: CLINIC | Age: 74
End: 2024-12-02
Payer: COMMERCIAL

## 2024-12-04 ENCOUNTER — OFFICE VISIT (OUTPATIENT)
Dept: PRIMARY CARE | Facility: CLINIC | Age: 74
End: 2024-12-04
Payer: COMMERCIAL

## 2024-12-04 VITALS
BODY MASS INDEX: 33.13 KG/M2 | TEMPERATURE: 96.8 F | DIASTOLIC BLOOD PRESSURE: 82 MMHG | HEART RATE: 87 BPM | WEIGHT: 180 LBS | SYSTOLIC BLOOD PRESSURE: 124 MMHG | OXYGEN SATURATION: 98 % | HEIGHT: 62 IN

## 2024-12-04 DIAGNOSIS — R82.90 ABNORMAL URINALYSIS: Primary | ICD-10-CM

## 2024-12-04 DIAGNOSIS — R39.9 UTI SYMPTOMS: ICD-10-CM

## 2024-12-04 LAB
POC APPEARANCE, URINE: ABNORMAL
POC BILIRUBIN, URINE: NEGATIVE
POC BLOOD, URINE: ABNORMAL
POC COLOR, URINE: YELLOW
POC GLUCOSE, URINE: ABNORMAL MG/DL
POC KETONES, URINE: NEGATIVE MG/DL
POC LEUKOCYTES, URINE: ABNORMAL
POC NITRITE,URINE: NEGATIVE
POC PH, URINE: 5 PH
POC PROTEIN, URINE: NEGATIVE MG/DL
POC SPECIFIC GRAVITY, URINE: >=1.03
POC UROBILINOGEN, URINE: 0.2 EU/DL

## 2024-12-04 PROCEDURE — 81003 URINALYSIS AUTO W/O SCOPE: CPT

## 2024-12-04 PROCEDURE — 87086 URINE CULTURE/COLONY COUNT: CPT

## 2024-12-04 PROCEDURE — 1158F ADVNC CARE PLAN TLK DOCD: CPT

## 2024-12-04 PROCEDURE — 99213 OFFICE O/P EST LOW 20 MIN: CPT

## 2024-12-04 PROCEDURE — 1123F ACP DISCUSS/DSCN MKR DOCD: CPT

## 2024-12-04 PROCEDURE — G2211 COMPLEX E/M VISIT ADD ON: HCPCS

## 2024-12-04 PROCEDURE — 1160F RVW MEDS BY RX/DR IN RCRD: CPT

## 2024-12-04 PROCEDURE — 3074F SYST BP LT 130 MM HG: CPT

## 2024-12-04 PROCEDURE — 1159F MED LIST DOCD IN RCRD: CPT

## 2024-12-04 PROCEDURE — 1126F AMNT PAIN NOTED NONE PRSNT: CPT

## 2024-12-04 PROCEDURE — 1036F TOBACCO NON-USER: CPT

## 2024-12-04 PROCEDURE — 3079F DIAST BP 80-89 MM HG: CPT

## 2024-12-04 ASSESSMENT — ENCOUNTER SYMPTOMS
COUGH: 0
ACTIVITY CHANGE: 0
APPETITE CHANGE: 0
FREQUENCY: 0
FEVER: 0
WHEEZING: 0
SHORTNESS OF BREATH: 0
CHILLS: 0
FLANK PAIN: 0
DIFFICULTY URINATING: 0
ABDOMINAL PAIN: 0
PALPITATIONS: 0
UNEXPECTED WEIGHT CHANGE: 0
DYSURIA: 0
FATIGUE: 0
HEMATURIA: 0

## 2024-12-04 ASSESSMENT — PAIN SCALES - GENERAL: PAINLEVEL_OUTOF10: 0-NO PAIN

## 2024-12-04 NOTE — ASSESSMENT & PLAN NOTE
Completed course of cipro for UTI found incidentally during ER visit.  Denies any urinary symptoms today.  Urine sent for culture.  Will hold off on further antibiotics as she has an extensive list of allergies and is currently experiencing no urinary symptoms, fever or change in mental status. Will follow up with culture results for any further planning. She is agreeable to this plan.    Orders:    Urine Culture

## 2024-12-04 NOTE — PROGRESS NOTES
Subjective     Patient ID: Beryl Sierra is a 74 y.o. female who presents for UTI.      HPI  Beryl presents for UTI follow up. Seen on 11/24/2024 in ER for concerns of dizziness and dehydration, found to have UTI. She was treated with IV Ciprofloxin in ER followed by a 3-day course of ciprofloxacin upon discharge. She reports she completed the full course of antibiotics without adverse reactions.  She denies any further urinary symptoms, although admits she did not realize she had UTI as she was not experiencing urinary symptoms prior to ER visit.     Patient's recent visit notes, medication and allergy lists, past medical surgical social hx, immunization, vitals, problem list, recent tests were reviewed by me for pertinence to this visit.    Current Outpatient Medications:     acetaminophen (Tylenol Arthritis Pain) 650 mg ER tablet, Take 2 tablets (1,300 mg) by mouth 2 times a day., Disp: , Rfl:     naproxen sodium (Aleve) 220 mg tablet, 1 tablet with food or milk as needed Orally every 12 hrs, Disp: , Rfl:     ciprofloxacin (Cipro) 500 mg tablet, Take 1 tablet (500 mg) by mouth 2 times a day for 3 days. (Patient not taking: Reported on 12/4/2024), Disp: 6 tablet, Rfl: 0    meclizine (Antivert) 25 mg tablet, Take 1 tablet (25 mg) by mouth 3 times a day as needed for dizziness. (Patient not taking: Reported on 10/21/2024), Disp: , Rfl:     omeprazole (PriLOSEC) 20 mg DR capsule, Take 1 capsule (20 mg) by mouth once daily. As needed (Patient not taking: Reported on 10/21/2024), Disp: 90 capsule, Rfl: 0      Review of Systems   Constitutional:  Negative for activity change, appetite change, chills, fatigue, fever and unexpected weight change.   Respiratory:  Negative for cough, shortness of breath and wheezing.    Cardiovascular:  Negative for chest pain and palpitations.   Gastrointestinal:  Negative for abdominal pain.   Genitourinary:  Negative for decreased urine volume, difficulty urinating, dysuria, flank  "pain, frequency, hematuria, pelvic pain and urgency.             Objective   /82 (BP Location: Left arm, Patient Position: Sitting, BP Cuff Size: Large adult)   Pulse 87   Temp 36 °C (96.8 °F) (Temporal)   Ht 1.575 m (5' 2\")   Wt 81.6 kg (180 lb)   SpO2 98%   BMI 32.92 kg/m²       Physical Exam  Vitals and nursing note reviewed.   Constitutional:       General: She is not in acute distress.     Appearance: Normal appearance.   Cardiovascular:      Rate and Rhythm: Normal rate and regular rhythm.      Heart sounds: Normal heart sounds, S1 normal and S2 normal.   Pulmonary:      Effort: Pulmonary effort is normal.      Breath sounds: Normal breath sounds and air entry.   Abdominal:      General: Bowel sounds are normal. There is no distension.      Palpations: Abdomen is soft. There is no hepatomegaly or splenomegaly.      Tenderness: There is no abdominal tenderness. There is no right CVA tenderness, left CVA tenderness, guarding or rebound.   Skin:     General: Skin is warm and dry.      Capillary Refill: Capillary refill takes less than 2 seconds.   Neurological:      General: No focal deficit present.      Mental Status: She is alert.   Psychiatric:         Behavior: Behavior is cooperative.             Assessment & Plan  Abnormal urinalysis  Completed course of cipro for UTI found incidentally during ER visit.  Denies any urinary symptoms today.  Urine sent for culture.  Will hold off on further antibiotics as she has an extensive list of allergies and is currently experiencing no urinary symptoms, fever or change in mental status. Will follow up with culture results for any further planning. She is agreeable to this plan.    Orders:    Urine Culture    UTI symptoms  Results reviewed, plan as noted above.   Orders:    POCT UA Automated manually resulted          Patient understands and agrees with treatment plan.    Georgiana Otero, APRN-CNP  "

## 2024-12-06 LAB — BACTERIA UR CULT: NORMAL

## 2024-12-08 NOTE — ASSESSMENT & PLAN NOTE
Labs from November: Na+ 138 K+ 3.8 Creat 0.85 (GFR 71)  Blood pressure not perfectly controlled, but patient intolerant to multiple medications.  I think at this point we will just except for mildly elevated blood pressure and attempts to keep the patient comfortable.  She also is not interested in trying a new medication at this time.  Not sure what I would choose giving her multiple intolerances as well.  Will thus stress hygienic measures and follow on a regular basis.

## 2024-12-09 ENCOUNTER — APPOINTMENT (OUTPATIENT)
Dept: SPEECH THERAPY | Facility: CLINIC | Age: 74
End: 2024-12-09
Payer: MEDICARE

## 2024-12-12 ENCOUNTER — DOCUMENTATION (OUTPATIENT)
Dept: SPEECH THERAPY | Facility: CLINIC | Age: 74
End: 2024-12-12
Payer: COMMERCIAL
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
## 2024-12-13 ENCOUNTER — TREATMENT (OUTPATIENT)
Dept: PHYSICAL THERAPY | Facility: CLINIC | Age: 74
End: 2024-12-13
Payer: MEDICARE

## 2024-12-13 DIAGNOSIS — H53.9 VISUAL DISORDER: ICD-10-CM

## 2024-12-13 DIAGNOSIS — M25.512 LEFT SHOULDER PAIN, UNSPECIFIED CHRONICITY: ICD-10-CM

## 2024-12-13 DIAGNOSIS — F40.298 PHONOPHOBIA: ICD-10-CM

## 2024-12-13 DIAGNOSIS — R26.89 BALANCE DISORDER: ICD-10-CM
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
## 2024-12-13 DIAGNOSIS — M54.2 CERVICAL PAIN: ICD-10-CM

## 2024-12-13 DIAGNOSIS — R42 DIZZINESS: ICD-10-CM

## 2024-12-13 DIAGNOSIS — S16.1XXA CERVICAL STRAIN, ACUTE, INITIAL ENCOUNTER: ICD-10-CM

## 2024-12-13 DIAGNOSIS — S06.0X0A CONCUSSION WITHOUT LOSS OF CONSCIOUSNESS, INITIAL ENCOUNTER: ICD-10-CM

## 2024-12-13 DIAGNOSIS — S16.1XXD CERVICAL STRAIN, SUBSEQUENT ENCOUNTER: ICD-10-CM

## 2024-12-13 DIAGNOSIS — G44.319 ACUTE POST-TRAUMATIC HEADACHE, NOT INTRACTABLE: ICD-10-CM

## 2024-12-13 DIAGNOSIS — H53.149 PHOTOPHOBIA: ICD-10-CM
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
## 2024-12-13 PROCEDURE — 97140 MANUAL THERAPY 1/> REGIONS: CPT | Mod: GP,CQ

## 2024-12-13 PROCEDURE — 97014 ELECTRIC STIMULATION THERAPY: CPT | Mod: GP,CQ

## 2024-12-13 PROCEDURE — 97110 THERAPEUTIC EXERCISES: CPT | Mod: GP,CQ

## 2024-12-13 ASSESSMENT — PAIN SCALES - GENERAL: PAINLEVEL_OUTOF10: 4

## 2024-12-13 ASSESSMENT — PAIN - FUNCTIONAL ASSESSMENT: PAIN_FUNCTIONAL_ASSESSMENT: 0-10
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
## 2024-12-16 ENCOUNTER — OFFICE VISIT (OUTPATIENT)
Dept: CARDIOLOGY | Facility: CLINIC | Age: 74
End: 2024-12-16
Payer: COMMERCIAL

## 2024-12-16 ENCOUNTER — APPOINTMENT (OUTPATIENT)
Dept: PHYSICAL THERAPY | Facility: CLINIC | Age: 74
End: 2024-12-16
Payer: MEDICARE

## 2024-12-16 VITALS
DIASTOLIC BLOOD PRESSURE: 80 MMHG | WEIGHT: 179 LBS | SYSTOLIC BLOOD PRESSURE: 132 MMHG | BODY MASS INDEX: 32.74 KG/M2 | OXYGEN SATURATION: 98 % | HEART RATE: 95 BPM

## 2024-12-16 DIAGNOSIS — I10 PRIMARY HYPERTENSION: Primary | ICD-10-CM

## 2024-12-16 PROCEDURE — 99213 OFFICE O/P EST LOW 20 MIN: CPT | Performed by: INTERNAL MEDICINE

## 2024-12-16 PROCEDURE — 1159F MED LIST DOCD IN RCRD: CPT | Performed by: INTERNAL MEDICINE

## 2024-12-16 PROCEDURE — 1123F ACP DISCUSS/DSCN MKR DOCD: CPT | Performed by: INTERNAL MEDICINE

## 2024-12-16 PROCEDURE — 3075F SYST BP GE 130 - 139MM HG: CPT | Performed by: INTERNAL MEDICINE

## 2024-12-16 PROCEDURE — 1125F AMNT PAIN NOTED PAIN PRSNT: CPT | Performed by: INTERNAL MEDICINE

## 2024-12-16 PROCEDURE — 1036F TOBACCO NON-USER: CPT | Performed by: INTERNAL MEDICINE

## 2024-12-16 PROCEDURE — 3079F DIAST BP 80-89 MM HG: CPT | Performed by: INTERNAL MEDICINE

## 2024-12-16 RX ORDER — OMEPRAZOLE 20 MG/1
20 TABLET, DELAYED RELEASE ORAL
COMMUNITY

## 2024-12-16 ASSESSMENT — ENCOUNTER SYMPTOMS
DYSPNEA ON EXERTION: 0
COUGH: 0
PARESTHESIAS: 0
PALPITATIONS: 0
OCCASIONAL FEELINGS OF UNSTEADINESS: 1
BACK PAIN: 1
DYSURIA: 0
SHORTNESS OF BREATH: 0
HEMATURIA: 0
NUMBNESS: 0
DEPRESSION: 1
LOSS OF SENSATION IN FEET: 0
BLURRED VISION: 0
ABDOMINAL PAIN: 0

## 2024-12-16 ASSESSMENT — PAIN SCALES - GENERAL: PAINLEVEL_OUTOF10: 3
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
## 2024-12-18 ASSESSMENT — ENCOUNTER SYMPTOMS
ARTHRALGIAS: 1
CONSTITUTIONAL NEGATIVE: 1
CARDIOVASCULAR NEGATIVE: 1
RESPIRATORY NEGATIVE: 1
MYALGIAS: 1
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
## 2024-12-18 NOTE — PATIENT INSTRUCTIONS
May use RICE therapy as needed  Continue physical therapy 1-2 times a week for 8-10 weeks with manual therapy as well as dry needling and IASTM  Additionally reviewed modifying activities to be performed while exercising as well as activities with daily living  Discussed in detail with the patient to the level of their understanding the possibility in the future of regenerative injections versus corticosteroid injections  Recommendation over-the-counter calcium 500mg, 3 times a day with vitamin-D3 2669-2427+ units a day with food as well as a daily multivitamin  Recommendation over-the-counter Move Free for joint health  May take OTC Tylenol Extra Strength or OTC Tylenol Arthritis, taking one every 6-8 hours with food as needed for pain management.  Patient advised regarding the risks and/or potential adverse reactions and/or side effects of any prescribed medications along with any over-the-counter medications or any supplements used. Patient advised to seek immediate medical care if any adverse reactions occur. The patient and/or patient(s) parent(s) verbalized their understanding.   Follow up with Dr. Dale in 4 weeks for possible Cortisone injections

## 2024-12-19 ENCOUNTER — TREATMENT (OUTPATIENT)
Dept: PHYSICAL THERAPY | Facility: CLINIC | Age: 74
End: 2024-12-19
Payer: MEDICARE

## 2024-12-19 DIAGNOSIS — H53.9 VISUAL DISORDER: ICD-10-CM

## 2024-12-19 DIAGNOSIS — R26.89 BALANCE DISORDER: ICD-10-CM

## 2024-12-19 DIAGNOSIS — S06.0X0A CONCUSSION WITHOUT LOSS OF CONSCIOUSNESS, INITIAL ENCOUNTER: ICD-10-CM

## 2024-12-19 DIAGNOSIS — H53.149 PHOTOPHOBIA: ICD-10-CM

## 2024-12-19 DIAGNOSIS — R42 DIZZINESS: ICD-10-CM
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
## 2024-12-19 DIAGNOSIS — F40.298 PHONOPHOBIA: ICD-10-CM

## 2024-12-19 DIAGNOSIS — S16.1XXA CERVICAL STRAIN, ACUTE, INITIAL ENCOUNTER: ICD-10-CM

## 2024-12-19 DIAGNOSIS — G44.319 ACUTE POST-TRAUMATIC HEADACHE, NOT INTRACTABLE: ICD-10-CM

## 2024-12-19 DIAGNOSIS — M54.2 CERVICAL PAIN: ICD-10-CM
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
## 2024-12-19 DIAGNOSIS — S16.1XXD CERVICAL STRAIN, SUBSEQUENT ENCOUNTER: ICD-10-CM

## 2024-12-19 DIAGNOSIS — M25.512 LEFT SHOULDER PAIN, UNSPECIFIED CHRONICITY: ICD-10-CM

## 2024-12-19 PROCEDURE — 97014 ELECTRIC STIMULATION THERAPY: CPT | Mod: GP,CQ

## 2024-12-19 PROCEDURE — 97110 THERAPEUTIC EXERCISES: CPT | Mod: GP,CQ

## 2024-12-19 PROCEDURE — 97140 MANUAL THERAPY 1/> REGIONS: CPT | Mod: GP,CQ

## 2024-12-19 ASSESSMENT — PAIN SCALES - GENERAL: PAINLEVEL_OUTOF10: 5 - MODERATE PAIN

## 2024-12-19 ASSESSMENT — PAIN - FUNCTIONAL ASSESSMENT: PAIN_FUNCTIONAL_ASSESSMENT: 0-10
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
## 2024-12-23 ENCOUNTER — TREATMENT (OUTPATIENT)
Dept: PHYSICAL THERAPY | Facility: CLINIC | Age: 74
End: 2024-12-23
Payer: MEDICARE

## 2024-12-23 DIAGNOSIS — F40.298 PHONOPHOBIA: ICD-10-CM

## 2024-12-23 DIAGNOSIS — H53.149 PHOTOPHOBIA: ICD-10-CM

## 2024-12-23 DIAGNOSIS — S06.0X0A CONCUSSION WITHOUT LOSS OF CONSCIOUSNESS, INITIAL ENCOUNTER: ICD-10-CM

## 2024-12-23 DIAGNOSIS — S16.1XXD CERVICAL STRAIN, SUBSEQUENT ENCOUNTER: ICD-10-CM

## 2024-12-23 DIAGNOSIS — G44.319 ACUTE POST-TRAUMATIC HEADACHE, NOT INTRACTABLE: ICD-10-CM

## 2024-12-23 DIAGNOSIS — R42 DIZZINESS: ICD-10-CM
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
## 2024-12-23 DIAGNOSIS — M54.2 CERVICAL PAIN: ICD-10-CM

## 2024-12-23 DIAGNOSIS — M25.512 LEFT SHOULDER PAIN, UNSPECIFIED CHRONICITY: ICD-10-CM

## 2024-12-23 DIAGNOSIS — H53.9 VISUAL DISORDER: ICD-10-CM
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
## 2024-12-23 DIAGNOSIS — R26.89 BALANCE DISORDER: ICD-10-CM

## 2024-12-23 DIAGNOSIS — S16.1XXA CERVICAL STRAIN, ACUTE, INITIAL ENCOUNTER: ICD-10-CM

## 2024-12-23 PROCEDURE — 97140 MANUAL THERAPY 1/> REGIONS: CPT | Mod: GP,CQ

## 2024-12-23 PROCEDURE — 97110 THERAPEUTIC EXERCISES: CPT | Mod: GP,CQ

## 2024-12-23 PROCEDURE — 97014 ELECTRIC STIMULATION THERAPY: CPT | Mod: GP,CQ

## 2024-12-23 ASSESSMENT — PAIN - FUNCTIONAL ASSESSMENT: PAIN_FUNCTIONAL_ASSESSMENT: 0-10

## 2024-12-23 ASSESSMENT — PAIN SCALES - GENERAL: PAINLEVEL_OUTOF10: 7

## 2024-12-24 NOTE — PROGRESS NOTES
"Physical Therapy Treatment    Patient Name: Beryl Sierra  MRN: [id number]  Encounter date: 12/26/2024         Visit # Visit count could not be calculated. Make sure you are using a visit which is associated with an episode. of 30 (progress note visit #18)  Visits/Dates Authorized: NO AUTH / [place] VISITS     Current Problem:   Problem List Items Addressed This Visit    None              Precautions:   STEADI Fall Risk Score (The score of 4 or more indicates an increased risk of falling): 7  Precautions Comment: 2020 Anterior cervical fusion C3-C4, DM  *many allergies (reaction with KT tape)        Subjective   General:    Pt reports good tolerance to last session and is currently not feeling \"the knife\".  Pt reports current L shoulder pain rated at 7 of 10, \"it always feels like someone is sitting on it\".  Pt reports her shoulder felt good on Friday, not on Saturday.  Pt reports weather changes do seem to affect how L shoulder feels.   Pt reports she has not slept well over the week-end, unsure why.  Pt reports performing wall pushup at home help, \"that's my knife remover\"  Pre-Treatment Symptoms:        Objective   Findings:      Tight tender trigger points to L levator scapula insertion, as well as L rhomboid attachment on vertebral border of L scapula (though less so then last session)    11/14/24 shoulder MRI: IMPRESSION:  1.  Severe tendinopathy of the rotator cuff tendons without evidence  of a tear.  2. Severe acromioclavicular and moderate glenohumeral joint  osteoarthritis.    10/25/24  L shoulder IR/ER ROM normal and symmetrical  MMT L shoulder IR 4-/5 painful, ER 3+/5 painful, supraspinatus 4-/5 painful       Treatments:  Therapeutic Exercise:   Nustep L4 8 min  s= 6   arm= 7  Shoulder isometrics flex, abd, ext, IR, ER 15 x 5\" ea  Wall push-ups  2 x 10  Open book  at  wall open book x 7 reps each R and L    Deferred:  UBE (seated 11/29/24 due to L hip pain) retro/fw L3 5min  Pulleys scaption with " "overpressure  TBAND ADD and ext 2 x 10 ea- green  Tband ER/IR 2 x 10 ea green  standing shoulder tband B ER peach 2x12  Tband rows 2x15- green  TB horizontal abduction, peach, 2 x 10  Supine serratus press 2#   Reverse flies x12 orange  Crossover symmetry- peach 2x10  Supine Tband sidepull orange x20  Thoracic ext over chair  Thoracic ext over ball in corner  Scap stab ball at wall CW/CCW  Shoulder AAROM with cane, improved comfort L thumb up  snow preeti with UEs on pillows, intermittent scap press  Supine chin nods  Supine B shld flex  Open book  BW shld circles    Neuromuscular Re-Ed:   Deferred:  Resisted gait 10# FW/BW x3 ea, 7.5# x 2 R/L  RB ML static  RB AP static, shift  Foam DLS EC  DLS foam weight shift  Gait with head turns, nods, diagonals  Gait with 360 turns CW/CCW  Seated Oculomotors/visuo-vestibular:  Sharma Chart instructed and performed letter to letter, instructed options alternating lines, folding paper to reduce visual stimuli as needed.  smooth pursuits L/R, up/down, diagonals, vergence (Pencil push ups)  VOR x1  VOR x2  Vergence with VOR horizontal  Saccades: horizontal and vertical, diagonals, vergence  X30\" each     Manual Therapy:   Seated with L UE unloaded (pillows on lap), STM to L rhomboids, levator, UT, interspinales of upper and mid thoracic vertebrae to decrease tightness and tenderness     Deferred:  Cupping, dynamic cupping, cupping with light stripping CPS and periscapular mm seated with UE unloading  Upper Tspine MWM flex/ext L and R  SOR, manual distraction, L pectoral cross friction, MFR for reduced L shoulder girdle protraction  Gentle PA mob to C2 spinous process   Sidelying on R scapular mobs and gentle STM  Tspine self release with tennis ball at wall, with cane  Seated C1-C2 towel assisted rotation MWM to L x3  MET for cervical SB L/R  Gentle cervical lateral glides      Modalities:   Unattended e-stim: Russian: applied to L CPS and UT, reciprocal, Intensity to tolerance, 5 " "seconds on, 5 seconds off, applied for 10 minutes, in seated, with wedge to unload L UE, with moist heat applied to L upper trap draped over medially towards rhomboids.     Deferred:  Dry needling following informed consent: with e-stim; 5Hz, mA to tolerance, applied to L shoulder and UT, for 10 minutes.    Deferred:  Following informed consent DN performed supine and prone to:  suboccipitals, CPS, TPS, spinal accessory n, dorsal scapular n, L lateral pectoral n, L subscapularis anterior and medial approaches, SCM, B supraorbital n, B infraorbital n         Assessment:   Patient tolerated treatment well. Patient appears to be working hard in clinic and motivated to decrease pain and restore all functional deficits. Verbal and/or tactile cues given for proper form, and avoidance of substitution patterns with all exercises. All infection control policies followed during this visit. No observed antalgia with exercise performance.  Pt continues to  responds well with manual intervention reporting that \"the massage really helps\".  Pt did present with less palpable muscle tightness L rhomboids (compared to last session), though remains with palpable tightness and tenderness to levator scapula insertion.     Post-Treatment Symptoms:    less      Plan: Cont 30 total visits.    Therapy options: will be seen for skilled physical therapy services  Planned modality interventions: electrical stimulation/Russian stimulation, thermotherapy (hydrocollator packs),  Planned therapy interventions: manual therapy, neuromuscular re-education, postural training, strengthening, stretching, gait training, functional ROM exercises, flexibility, body mechanics training and balance/weight-bearing training  Other planned therapy interventions: vestibular therapy, DN  Frequency: 2x week  Duration in visits: 30     HEP/Access codes:  8/19/24 C2 button  8/21/24 [id number]  - Sidelying Open Book  - 1-2 x daily - 3-4 x weekly - 1-2 sets - 10 reps  - " Supine Shoulder Flexion Extension AAROM with Dowel  - 1-2 x daily - 3-4 x weekly - 1-2 sets - 10 reps  - Seated Cat Cow  - 1-2 x daily - 3-4 x weekly - 2 sets - 10 reps  8/26/24 seated smooth pursuits, VOR, VOR cancellation, saccades  9/10/24 VOR with vergence, saccades with vergence, visuo-vestibular opposites, C1-C2 L towel rotation  9/12/24 Sharma chart seated  [id number] Date: 11/14/2024  - Standing Shoulder Horizontal Abduction with Resistance  - 1-2 x daily - 6 x weekly - 2 sets - 10 reps  [id number] Date: 11/29/2024  - Standing Isometric Shoulder Internal Rotation at Doorway  - 2 x daily - 1 sets - 10-15 reps - 10-15 hold  - Standing Isometric Shoulder Flexion with Doorway - Arm Bent  - 2 x daily - 1 sets - 10-15 reps - 10-15 hold  - Standing Isometric Shoulder Abduction with Doorway - Arm Bent  - 2 x daily - 1-2 sets - 10-15 reps - 10-15 hold  - Standing Isometric Shoulder External Rotation with Doorway  - 2 x daily - 1 sets - 10-30 reps - 10-15 hold  - Standing Isometric Shoulder Extension with Doorway - Arm Bent  - 2 x daily - 1 sets - 10-30 reps - 10-15 hold    Goals:

## 2024-12-26 ENCOUNTER — APPOINTMENT (OUTPATIENT)
Dept: PHYSICAL THERAPY | Facility: CLINIC | Age: 74
End: 2024-12-26
Payer: MEDICARE

## 2024-12-26 ENCOUNTER — DOCUMENTATION (OUTPATIENT)
Dept: PHYSICAL THERAPY | Facility: CLINIC | Age: 74
End: 2024-12-26
Payer: COMMERCIAL

## 2024-12-26 NOTE — PROGRESS NOTES
Physical Therapy                 Therapy Communication Note    Patient Name: Beryl Sierra  MRN: [id number]  Encounter Date: 12/26/2024    Discipline: Physical Therapy    Missed Time: Cancel    Comment: pt called to cxl secondary to experiencing vertigo and nausea

## 2025-01-06 ENCOUNTER — OFFICE VISIT (OUTPATIENT)
Dept: SPORTS MEDICINE | Facility: CLINIC | Age: 75
End: 2025-01-06
Payer: MEDICARE

## 2025-01-06 VITALS
BODY MASS INDEX: 32.74 KG/M2 | HEART RATE: 83 BPM | WEIGHT: 179 LBS | SYSTOLIC BLOOD PRESSURE: 130 MMHG | DIASTOLIC BLOOD PRESSURE: 78 MMHG

## 2025-01-06 DIAGNOSIS — M25.512 ACUTE PAIN OF LEFT SHOULDER: ICD-10-CM

## 2025-01-06 DIAGNOSIS — S46.012D ROTATOR CUFF STRAIN, LEFT, SUBSEQUENT ENCOUNTER: ICD-10-CM

## 2025-01-06 DIAGNOSIS — S16.1XXD CERVICAL STRAIN, SUBSEQUENT ENCOUNTER: ICD-10-CM

## 2025-01-06 DIAGNOSIS — M19.011 PRIMARY OSTEOARTHRITIS OF RIGHT SHOULDER: ICD-10-CM

## 2025-01-06 PROCEDURE — 1125F AMNT PAIN NOTED PAIN PRSNT: CPT | Performed by: NURSE PRACTITIONER

## 2025-01-06 PROCEDURE — 99214 OFFICE O/P EST MOD 30 MIN: CPT | Performed by: NURSE PRACTITIONER

## 2025-01-06 PROCEDURE — 1123F ACP DISCUSS/DSCN MKR DOCD: CPT | Performed by: NURSE PRACTITIONER

## 2025-01-06 PROCEDURE — 1159F MED LIST DOCD IN RCRD: CPT | Performed by: NURSE PRACTITIONER

## 2025-01-06 PROCEDURE — 1036F TOBACCO NON-USER: CPT | Performed by: NURSE PRACTITIONER

## 2025-01-06 PROCEDURE — 3078F DIAST BP <80 MM HG: CPT | Performed by: NURSE PRACTITIONER

## 2025-01-06 PROCEDURE — 3075F SYST BP GE 130 - 139MM HG: CPT | Performed by: NURSE PRACTITIONER

## 2025-01-06 RX ORDER — MECLIZINE HYDROCHLORIDE 25 MG/1
2.5 TABLET ORAL 3 TIMES DAILY PRN
COMMUNITY

## 2025-01-06 ASSESSMENT — ENCOUNTER SYMPTOMS
LOSS OF SENSATION IN FEET: 0
OCCASIONAL FEELINGS OF UNSTEADINESS: 0
DEPRESSION: 0

## 2025-01-06 ASSESSMENT — PAIN SCALES - GENERAL: PAINLEVEL_OUTOF10: 2

## 2025-01-14 ENCOUNTER — TREATMENT (OUTPATIENT)
Dept: PHYSICAL THERAPY | Facility: CLINIC | Age: 75
End: 2025-01-14
Payer: MEDICARE

## 2025-01-14 DIAGNOSIS — H53.9 VISUAL DISORDER: ICD-10-CM

## 2025-01-14 DIAGNOSIS — R42 DIZZINESS: ICD-10-CM

## 2025-01-14 DIAGNOSIS — F40.298 PHONOPHOBIA: ICD-10-CM

## 2025-01-14 DIAGNOSIS — G44.319 ACUTE POST-TRAUMATIC HEADACHE, NOT INTRACTABLE: ICD-10-CM

## 2025-01-14 DIAGNOSIS — S16.1XXD CERVICAL STRAIN, SUBSEQUENT ENCOUNTER: ICD-10-CM

## 2025-01-14 DIAGNOSIS — R26.89 BALANCE DISORDER: ICD-10-CM
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
## 2025-01-14 DIAGNOSIS — S06.0X0S CONCUSSION WITHOUT LOSS OF CONSCIOUSNESS, SEQUELA (CMS-HCC): ICD-10-CM

## 2025-01-14 DIAGNOSIS — M54.2 CERVICAL PAIN: ICD-10-CM

## 2025-01-14 DIAGNOSIS — H53.149 PHOTOPHOBIA: ICD-10-CM

## 2025-01-14 DIAGNOSIS — M25.512 LEFT SHOULDER PAIN, UNSPECIFIED CHRONICITY: ICD-10-CM

## 2025-01-14 PROCEDURE — 97140 MANUAL THERAPY 1/> REGIONS: CPT | Mod: GP

## 2025-01-14 PROCEDURE — 97110 THERAPEUTIC EXERCISES: CPT | Mod: GP

## 2025-01-14 ASSESSMENT — PAIN - FUNCTIONAL ASSESSMENT: PAIN_FUNCTIONAL_ASSESSMENT: 0-10
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
## 2025-01-17 ENCOUNTER — OFFICE VISIT (OUTPATIENT)
Dept: PRIMARY CARE | Facility: CLINIC | Age: 75
End: 2025-01-17
Payer: MEDICARE

## 2025-01-17 VITALS
DIASTOLIC BLOOD PRESSURE: 72 MMHG | OXYGEN SATURATION: 97 % | SYSTOLIC BLOOD PRESSURE: 126 MMHG | TEMPERATURE: 97.8 F | BODY MASS INDEX: 32.19 KG/M2 | HEART RATE: 91 BPM | WEIGHT: 176 LBS

## 2025-01-17 DIAGNOSIS — J06.9 UPPER RESPIRATORY TRACT INFECTION, UNSPECIFIED TYPE: Primary | ICD-10-CM

## 2025-01-17 LAB
POC BINAX EXPIRATION: ABNORMAL
POC BINAX NOW COVID SERIAL NUMBER: ABNORMAL
POC RAPID INFLUENZA A: NEGATIVE
POC RAPID INFLUENZA B: NEGATIVE
POC SARS-COV-2 AG BINAX: ABNORMAL

## 2025-01-17 ASSESSMENT — ENCOUNTER SYMPTOMS
FREQUENCY: 0
SHORTNESS OF BREATH: 1
DIZZINESS: 0
MYALGIAS: 0
RHINORRHEA: 1
NERVOUS/ANXIOUS: 0
WHEEZING: 0
SINUS PRESSURE: 1
SINUS PAIN: 0
EYE DISCHARGE: 1
ARTHRALGIAS: 0
DYSPHORIC MOOD: 0
FATIGUE: 1
FEVER: 0
NAUSEA: 0
CHILLS: 0
WOUND: 0
LIGHT-HEADEDNESS: 0
CONSTIPATION: 0
PALPITATIONS: 0
HEADACHES: 0
DYSURIA: 0
SLEEP DISTURBANCE: 0
POLYDIPSIA: 0
COUGH: 1
VOMITING: 0
DIARRHEA: 0

## 2025-01-17 ASSESSMENT — PAIN SCALES - GENERAL: PAINLEVEL_OUTOF10: 0-NO PAIN

## 2025-01-17 NOTE — PROGRESS NOTES
Subjective   Patient ID: Beryl Sierra is a 74 y.o. female who presents for Sick Visit (Cough, sore throat, ear ache, eye drainage, tired x 4 days/Pt stated sx are not getting worse but have not gotten better).    Here today with URI symptoms.  Having increasing fatigue.  Has not tested for COVID.  Has been having some trouble breathing.  Cough productive of sputum.  Constantly clearing throat. No fevers or chills.      Currently also has a canker sore.          Review of Systems   Constitutional:  Positive for fatigue. Negative for chills and fever.   HENT:  Positive for congestion, rhinorrhea and sinus pressure. Negative for hearing loss, sinus pain and tinnitus.    Eyes:  Positive for discharge. Negative for visual disturbance.   Respiratory:  Positive for cough and shortness of breath. Negative for wheezing.    Cardiovascular:  Negative for chest pain, palpitations and leg swelling.   Gastrointestinal:  Negative for constipation, diarrhea, nausea and vomiting.   Endocrine: Negative for cold intolerance, heat intolerance, polydipsia and polyuria.   Genitourinary:  Negative for dysuria, frequency, menstrual problem, urgency and vaginal discharge.   Musculoskeletal:  Negative for arthralgias and myalgias.   Skin:  Negative for pallor, rash and wound.   Neurological:  Negative for dizziness, light-headedness and headaches.   Psychiatric/Behavioral:  Negative for dysphoric mood and sleep disturbance. The patient is not nervous/anxious.        Objective     Visit Vitals  /72   Pulse 91   Temp 36.6 °C (97.8 °F)   Wt 79.8 kg (176 lb)   SpO2 97%   BMI 32.19 kg/m²   OB Status Postmenopausal   Smoking Status Never   BSA 1.87 m²         Physical Exam  Constitutional:       Appearance: Normal appearance.   HENT:      Head: Normocephalic and atraumatic.      Right Ear: Tympanic membrane, ear canal and external ear normal.      Left Ear: Tympanic membrane, ear canal and external ear normal.      Nose: Congestion and  rhinorrhea present.      Right Turbinates: Enlarged and swollen.      Left Turbinates: Enlarged and swollen.      Mouth/Throat:      Mouth: Mucous membranes are moist.      Pharynx: Oropharynx is clear.   Eyes:      Extraocular Movements: Extraocular movements intact.      Conjunctiva/sclera: Conjunctivae normal.      Pupils: Pupils are equal, round, and reactive to light.   Cardiovascular:      Rate and Rhythm: Normal rate and regular rhythm.      Pulses: Normal pulses.      Heart sounds: Normal heart sounds.   Pulmonary:      Effort: Pulmonary effort is normal.      Breath sounds: No stridor. Wheezing present. No rhonchi or rales.   Abdominal:      General: There is no distension.      Palpations: Abdomen is soft.      Tenderness: There is no abdominal tenderness.   Musculoskeletal:         General: Normal range of motion.      Cervical back: Normal range of motion.   Lymphadenopathy:      Cervical: No cervical adenopathy.   Skin:     General: Skin is warm and dry.   Neurological:      Mental Status: She is alert and oriented to person, place, and time. Mental status is at baseline.   Psychiatric:         Mood and Affect: Mood normal.         Behavior: Behavior normal.         Assessment & Plan  Upper respiratory tract infection, unspecified type    Orders:    POCT BinaxNOW Covid-19 Ag Card manually resulted    POCT Influenza A/B manually resulted  Patient is positive for COVID 19.  Lungs are clear at this time.  Patient would be a candidate for Paxlovid, but given modest benefit to this medication and patient's tendency toward medication side effects, mutual decision was made to not use this for her.  Patient advised to continue wearing a mask around people until 10 days from symptom onset.  Her household members are also likely to have COVID.     Advised to get plenty of rest, fluids, supportive care with naproxen, acetaminophen.  If having worsening shortness of breath, limb swelling, blue tinge of lips or  fingers, to go promptly to emergency departmetn.      Reviewed and approved by YEFRI DIAZ on 1/17/25 at 8:54 PM.

## 2025-01-28 ENCOUNTER — TREATMENT (OUTPATIENT)
Dept: PHYSICAL THERAPY | Facility: CLINIC | Age: 75
End: 2025-01-28
Payer: MEDICARE

## 2025-01-28 DIAGNOSIS — S06.0X0S CONCUSSION WITHOUT LOSS OF CONSCIOUSNESS, SEQUELA (CMS-HCC): ICD-10-CM

## 2025-01-28 DIAGNOSIS — S16.1XXD CERVICAL STRAIN, SUBSEQUENT ENCOUNTER: ICD-10-CM

## 2025-01-28 DIAGNOSIS — H53.9 VISUAL DISORDER: ICD-10-CM

## 2025-01-28 DIAGNOSIS — H53.149 PHOTOPHOBIA: ICD-10-CM

## 2025-01-28 DIAGNOSIS — M54.2 CERVICAL PAIN: ICD-10-CM

## 2025-01-28 DIAGNOSIS — M25.512 LEFT SHOULDER PAIN, UNSPECIFIED CHRONICITY: Primary | ICD-10-CM
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
## 2025-01-28 DIAGNOSIS — R42 DIZZINESS: ICD-10-CM

## 2025-01-28 DIAGNOSIS — F40.298 PHONOPHOBIA: ICD-10-CM

## 2025-01-28 DIAGNOSIS — R26.89 BALANCE DISORDER: ICD-10-CM

## 2025-01-28 DIAGNOSIS — G44.319 ACUTE POST-TRAUMATIC HEADACHE, NOT INTRACTABLE: ICD-10-CM

## 2025-01-28 PROCEDURE — 97110 THERAPEUTIC EXERCISES: CPT | Mod: GP

## 2025-01-28 PROCEDURE — 97014 ELECTRIC STIMULATION THERAPY: CPT | Mod: GP

## 2025-01-28 ASSESSMENT — PAIN - FUNCTIONAL ASSESSMENT: PAIN_FUNCTIONAL_ASSESSMENT: 0-10
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
## 2025-02-03 ENCOUNTER — HOSPITAL ENCOUNTER (OUTPATIENT)
Dept: RADIOLOGY | Facility: CLINIC | Age: 75
Discharge: HOME | End: 2025-02-03
Payer: MEDICARE

## 2025-02-03 ENCOUNTER — OFFICE VISIT (OUTPATIENT)
Dept: SPORTS MEDICINE | Facility: CLINIC | Age: 75
End: 2025-02-03
Payer: MEDICARE

## 2025-02-03 VITALS
BODY MASS INDEX: 32.94 KG/M2 | SYSTOLIC BLOOD PRESSURE: 136 MMHG | DIASTOLIC BLOOD PRESSURE: 78 MMHG | HEIGHT: 62 IN | WEIGHT: 179 LBS | HEART RATE: 101 BPM

## 2025-02-03 DIAGNOSIS — M19.011 PRIMARY OSTEOARTHRITIS OF RIGHT SHOULDER: ICD-10-CM

## 2025-02-03 DIAGNOSIS — M54.2 CERVICALGIA: ICD-10-CM

## 2025-02-03 DIAGNOSIS — M19.012 OSTEOARTHRITIS OF LEFT SHOULDER, UNSPECIFIED OSTEOARTHRITIS TYPE: ICD-10-CM

## 2025-02-03 DIAGNOSIS — M54.12 RADICULITIS, CERVICAL: ICD-10-CM

## 2025-02-03 DIAGNOSIS — S49.92XD INJURY OF GLENOID LABRUM, LEFT, SUBSEQUENT ENCOUNTER: ICD-10-CM

## 2025-02-03 DIAGNOSIS — M19.012 DEGENERATIVE JOINT DISEASE OF LEFT ACROMIOCLAVICULAR JOINT: ICD-10-CM

## 2025-02-03 DIAGNOSIS — M25.512 ACUTE PAIN OF LEFT SHOULDER: ICD-10-CM

## 2025-02-03 DIAGNOSIS — S43.52XA ACROMIOCLAVICULAR SPRAIN, LEFT, INITIAL ENCOUNTER: ICD-10-CM

## 2025-02-03 DIAGNOSIS — M67.912 TENDINOPATHY OF ROTATOR CUFF, LEFT: Primary | ICD-10-CM

## 2025-02-03 DIAGNOSIS — M47.22 OSTEOARTHRITIS OF SPINE WITH RADICULOPATHY, CERVICAL REGION: ICD-10-CM

## 2025-02-03 DIAGNOSIS — S46.012D ROTATOR CUFF STRAIN, LEFT, SUBSEQUENT ENCOUNTER: ICD-10-CM

## 2025-02-03 DIAGNOSIS — M75.52 BURSITIS OF LEFT SHOULDER: ICD-10-CM

## 2025-02-03 DIAGNOSIS — M75.42 ROTATOR CUFF IMPINGEMENT SYNDROME OF LEFT SHOULDER: ICD-10-CM

## 2025-02-03 PROCEDURE — 99215 OFFICE O/P EST HI 40 MIN: CPT | Performed by: FAMILY MEDICINE

## 2025-02-03 PROCEDURE — 3078F DIAST BP <80 MM HG: CPT | Performed by: FAMILY MEDICINE

## 2025-02-03 PROCEDURE — 1159F MED LIST DOCD IN RCRD: CPT | Performed by: FAMILY MEDICINE

## 2025-02-03 PROCEDURE — 3075F SYST BP GE 130 - 139MM HG: CPT | Performed by: FAMILY MEDICINE

## 2025-02-03 PROCEDURE — 1036F TOBACCO NON-USER: CPT | Performed by: FAMILY MEDICINE

## 2025-02-03 PROCEDURE — 1125F AMNT PAIN NOTED PAIN PRSNT: CPT | Performed by: FAMILY MEDICINE

## 2025-02-03 PROCEDURE — 72050 X-RAY EXAM NECK SPINE 4/5VWS: CPT

## 2025-02-03 PROCEDURE — 1123F ACP DISCUSS/DSCN MKR DOCD: CPT | Performed by: FAMILY MEDICINE

## 2025-02-03 PROCEDURE — 72050 X-RAY EXAM NECK SPINE 4/5VWS: CPT | Performed by: RADIOLOGY

## 2025-02-03 PROCEDURE — 99215 OFFICE O/P EST HI 40 MIN: CPT | Mod: 25 | Performed by: FAMILY MEDICINE

## 2025-02-03 PROCEDURE — 20611 DRAIN/INJ JOINT/BURSA W/US: CPT | Mod: LT | Performed by: FAMILY MEDICINE

## 2025-02-03 ASSESSMENT — PATIENT HEALTH QUESTIONNAIRE - PHQ9
SUM OF ALL RESPONSES TO PHQ9 QUESTIONS 1 AND 2: 3
1. LITTLE INTEREST OR PLEASURE IN DOING THINGS: NEARLY EVERY DAY
9. THOUGHTS THAT YOU WOULD BE BETTER OFF DEAD, OR OF HURTING YOURSELF: NOT AT ALL
8. MOVING OR SPEAKING SO SLOWLY THAT OTHER PEOPLE COULD HAVE NOTICED. OR THE OPPOSITE, BEING SO FIGETY OR RESTLESS THAT YOU HAVE BEEN MOVING AROUND A LOT MORE THAN USUAL: NOT AT ALL
10. IF YOU CHECKED OFF ANY PROBLEMS, HOW DIFFICULT HAVE THESE PROBLEMS MADE IT FOR YOU TO DO YOUR WORK, TAKE CARE OF THINGS AT HOME, OR GET ALONG WITH OTHER PEOPLE: SOMEWHAT DIFFICULT
5. POOR APPETITE OR OVEREATING: NOT AT ALL
4. FEELING TIRED OR HAVING LITTLE ENERGY: SEVERAL DAYS
3. TROUBLE FALLING OR STAYING ASLEEP OR SLEEPING TOO MUCH: MORE THAN HALF THE DAYS
2. FEELING DOWN, DEPRESSED OR HOPELESS: NOT AT ALL
6. FEELING BAD ABOUT YOURSELF - OR THAT YOU ARE A FAILURE OR HAVE LET YOURSELF OR YOUR FAMILY DOWN: NOT AT ALL
SUM OF ALL RESPONSES TO PHQ QUESTIONS 1-9: 7
7. TROUBLE CONCENTRATING ON THINGS, SUCH AS READING THE NEWSPAPER OR WATCHING TELEVISION: SEVERAL DAYS

## 2025-02-03 ASSESSMENT — PAIN DESCRIPTION - DESCRIPTORS: DESCRIPTORS: STABBING

## 2025-02-03 ASSESSMENT — PAIN SCALES - GENERAL
PAINLEVEL_OUTOF10: 6
PAINLEVEL_OUTOF10: 6

## 2025-02-03 ASSESSMENT — ENCOUNTER SYMPTOMS
OCCASIONAL FEELINGS OF UNSTEADINESS: 0
LOSS OF SENSATION IN FEET: 0
DEPRESSION: 1

## 2025-02-03 ASSESSMENT — PAIN - FUNCTIONAL ASSESSMENT: PAIN_FUNCTIONAL_ASSESSMENT: 0-10

## 2025-02-03 NOTE — PATIENT INSTRUCTIONS
May use RICE therapy as needed  Continue physical therapy 1-2 times a week for 8-10 weeks with manual therapy as well as dry needling and IASTM  Additionally reviewed modifying activities to be performed while exercising as well as activities with daily living  Discussed in detail with the patient to the level of their understanding the possibility in the future of regenerative injections versus corticosteroid injections  Recommendation over-the-counter calcium 500mg, 3 times a day with vitamin-D3 4572-8621+ units a day with food as well as a daily multivitamin  Recommendation over-the-counter Move Free for joint health  May take OTC Tylenol Extra Strength or OTC Tylenol Arthritis, taking one every 6-8 hours with food as needed for pain management.  Patient advised regarding the risks and/or potential adverse reactions and/or side effects of any prescribed medications along with any over-the-counter medications or any supplements used. Patient advised to seek immediate medical care if any adverse reactions occur. The patient and/or patient(s) parent(s) verbalized their understanding.  Performed corticosteroid injection into the patients left shoulder under ultrasound. The patient tolerated the procedure well and without any adverse effects. Discharge instructions for CORTICOSTEROID injections were reviewed in detail with the patient to the level of their understanding; a copy of these instructions were provided to the patient at the time of this office visit.   Possibility of MRI of CERVICAL SPINE with MARS protocol or a CT w/ and without contrast to rule out herniated disc vs fracture vs other.     Follow up in 6-8 weeks or sooner if needed.

## 2025-02-04 ENCOUNTER — TREATMENT (OUTPATIENT)
Dept: PHYSICAL THERAPY | Facility: CLINIC | Age: 75
End: 2025-02-04
Payer: MEDICARE

## 2025-02-04 DIAGNOSIS — S06.0X0S CONCUSSION WITHOUT LOSS OF CONSCIOUSNESS, SEQUELA (CMS-HCC): ICD-10-CM

## 2025-02-04 DIAGNOSIS — S16.1XXD CERVICAL STRAIN, SUBSEQUENT ENCOUNTER: ICD-10-CM

## 2025-02-04 DIAGNOSIS — M54.2 CERVICAL PAIN: ICD-10-CM

## 2025-02-04 DIAGNOSIS — R42 DIZZINESS: ICD-10-CM

## 2025-02-04 DIAGNOSIS — R26.89 BALANCE DISORDER: ICD-10-CM

## 2025-02-04 DIAGNOSIS — H53.149 PHOTOPHOBIA: ICD-10-CM

## 2025-02-04 DIAGNOSIS — M25.512 LEFT SHOULDER PAIN, UNSPECIFIED CHRONICITY: ICD-10-CM

## 2025-02-04 DIAGNOSIS — M54.2 CERVICALGIA: Primary | ICD-10-CM

## 2025-02-04 DIAGNOSIS — F40.298 PHONOPHOBIA: ICD-10-CM
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
## 2025-02-04 DIAGNOSIS — G44.319 ACUTE POST-TRAUMATIC HEADACHE, NOT INTRACTABLE: ICD-10-CM

## 2025-02-04 DIAGNOSIS — H53.9 VISUAL DISORDER: ICD-10-CM

## 2025-02-04 PROCEDURE — 97110 THERAPEUTIC EXERCISES: CPT | Mod: GP

## 2025-02-04 PROCEDURE — 97140 MANUAL THERAPY 1/> REGIONS: CPT | Mod: GP

## 2025-02-04 PROCEDURE — 97014 ELECTRIC STIMULATION THERAPY: CPT | Mod: GP

## 2025-02-04 ASSESSMENT — PAIN - FUNCTIONAL ASSESSMENT: PAIN_FUNCTIONAL_ASSESSMENT: 0-10
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
## 2025-02-10 ENCOUNTER — TREATMENT (OUTPATIENT)
Dept: PHYSICAL THERAPY | Facility: CLINIC | Age: 75
End: 2025-02-10
Payer: MEDICARE

## 2025-02-10 DIAGNOSIS — R26.89 BALANCE DISORDER: ICD-10-CM

## 2025-02-10 DIAGNOSIS — M54.2 CERVICALGIA: Primary | ICD-10-CM
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
## 2025-02-10 DIAGNOSIS — F40.298 PHONOPHOBIA: ICD-10-CM

## 2025-02-10 DIAGNOSIS — H53.149 PHOTOPHOBIA: ICD-10-CM

## 2025-02-10 DIAGNOSIS — R42 DIZZINESS: ICD-10-CM

## 2025-02-10 DIAGNOSIS — M54.2 CERVICAL PAIN: ICD-10-CM

## 2025-02-10 DIAGNOSIS — S16.1XXD CERVICAL STRAIN, SUBSEQUENT ENCOUNTER: ICD-10-CM

## 2025-02-10 DIAGNOSIS — S06.0X0S CONCUSSION WITHOUT LOSS OF CONSCIOUSNESS, SEQUELA (CMS-HCC): ICD-10-CM

## 2025-02-10 DIAGNOSIS — G44.319 ACUTE POST-TRAUMATIC HEADACHE, NOT INTRACTABLE: ICD-10-CM

## 2025-02-10 DIAGNOSIS — H53.9 VISUAL DISORDER: ICD-10-CM

## 2025-02-10 DIAGNOSIS — M25.512 LEFT SHOULDER PAIN, UNSPECIFIED CHRONICITY: ICD-10-CM

## 2025-02-10 PROCEDURE — 97014 ELECTRIC STIMULATION THERAPY: CPT | Mod: GP

## 2025-02-10 PROCEDURE — 97110 THERAPEUTIC EXERCISES: CPT | Mod: GP

## 2025-02-10 PROCEDURE — 97140 MANUAL THERAPY 1/> REGIONS: CPT | Mod: GP

## 2025-02-10 ASSESSMENT — PAIN - FUNCTIONAL ASSESSMENT: PAIN_FUNCTIONAL_ASSESSMENT: 0-10
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
## 2025-02-17 ENCOUNTER — TREATMENT (OUTPATIENT)
Dept: PHYSICAL THERAPY | Facility: CLINIC | Age: 75
End: 2025-02-17
Payer: MEDICARE

## 2025-02-17 DIAGNOSIS — F40.298 PHONOPHOBIA: ICD-10-CM

## 2025-02-17 DIAGNOSIS — M54.2 CERVICAL PAIN: ICD-10-CM

## 2025-02-17 DIAGNOSIS — M25.512 LEFT SHOULDER PAIN, UNSPECIFIED CHRONICITY: Primary | ICD-10-CM

## 2025-02-17 DIAGNOSIS — S16.1XXD CERVICAL STRAIN, SUBSEQUENT ENCOUNTER: ICD-10-CM

## 2025-02-17 DIAGNOSIS — M54.2 CERVICALGIA: ICD-10-CM

## 2025-02-17 DIAGNOSIS — S06.0X0S CONCUSSION WITHOUT LOSS OF CONSCIOUSNESS, SEQUELA (CMS-HCC): ICD-10-CM

## 2025-02-17 DIAGNOSIS — G44.319 ACUTE POST-TRAUMATIC HEADACHE, NOT INTRACTABLE: ICD-10-CM
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
## 2025-02-17 DIAGNOSIS — H53.149 PHOTOPHOBIA: ICD-10-CM

## 2025-02-17 DIAGNOSIS — R26.89 BALANCE DISORDER: ICD-10-CM

## 2025-02-17 DIAGNOSIS — H53.9 VISUAL DISORDER: ICD-10-CM

## 2025-02-17 DIAGNOSIS — R42 DIZZINESS: ICD-10-CM

## 2025-02-17 PROCEDURE — 97035 APP MDLTY 1+ULTRASOUND EA 15: CPT | Mod: GP

## 2025-02-17 PROCEDURE — 97140 MANUAL THERAPY 1/> REGIONS: CPT | Mod: GP

## 2025-02-17 ASSESSMENT — PAIN - FUNCTIONAL ASSESSMENT: PAIN_FUNCTIONAL_ASSESSMENT: 0-10

## 2025-02-17 ASSESSMENT — PAIN SCALES - GENERAL: PAINLEVEL_OUTOF10: 7
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
## 2025-02-24 ENCOUNTER — TREATMENT (OUTPATIENT)
Dept: PHYSICAL THERAPY | Facility: CLINIC | Age: 75
End: 2025-02-24
Payer: MEDICARE

## 2025-02-24 DIAGNOSIS — M54.2 CERVICAL PAIN: ICD-10-CM

## 2025-02-24 DIAGNOSIS — R26.89 BALANCE DISORDER: ICD-10-CM

## 2025-02-24 DIAGNOSIS — F40.298 PHONOPHOBIA: ICD-10-CM

## 2025-02-24 DIAGNOSIS — M25.512 LEFT SHOULDER PAIN, UNSPECIFIED CHRONICITY: ICD-10-CM

## 2025-02-24 DIAGNOSIS — G44.319 ACUTE POST-TRAUMATIC HEADACHE, NOT INTRACTABLE: ICD-10-CM
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
## 2025-02-24 DIAGNOSIS — R42 DIZZINESS: ICD-10-CM

## 2025-02-24 DIAGNOSIS — H53.9 VISUAL DISORDER: ICD-10-CM

## 2025-02-24 DIAGNOSIS — M54.2 CERVICALGIA: Primary | ICD-10-CM

## 2025-02-24 DIAGNOSIS — S06.0X0S CONCUSSION WITHOUT LOSS OF CONSCIOUSNESS, SEQUELA (CMS-HCC): ICD-10-CM

## 2025-02-24 DIAGNOSIS — H53.149 PHOTOPHOBIA: ICD-10-CM

## 2025-02-24 DIAGNOSIS — S16.1XXD CERVICAL STRAIN, SUBSEQUENT ENCOUNTER: ICD-10-CM

## 2025-02-24 PROCEDURE — 97110 THERAPEUTIC EXERCISES: CPT | Mod: GP

## 2025-02-24 PROCEDURE — 97035 APP MDLTY 1+ULTRASOUND EA 15: CPT | Mod: GP

## 2025-02-24 ASSESSMENT — PAIN SCALES - GENERAL: PAINLEVEL_OUTOF10: 7

## 2025-02-24 ASSESSMENT — PAIN - FUNCTIONAL ASSESSMENT: PAIN_FUNCTIONAL_ASSESSMENT: 0-10
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
## 2025-03-03 ENCOUNTER — APPOINTMENT (OUTPATIENT)
Dept: PHYSICAL THERAPY | Facility: CLINIC | Age: 75
End: 2025-03-03
Payer: MEDICARE

## 2025-03-10 ENCOUNTER — TREATMENT (OUTPATIENT)
Dept: PHYSICAL THERAPY | Facility: CLINIC | Age: 75
End: 2025-03-10
Payer: MEDICARE

## 2025-03-10 ENCOUNTER — PATIENT MESSAGE (OUTPATIENT)
Dept: PRIMARY CARE | Facility: CLINIC | Age: 75
End: 2025-03-10
Payer: MEDICARE

## 2025-03-10 DIAGNOSIS — H53.9 VISUAL DISORDER: ICD-10-CM

## 2025-03-10 DIAGNOSIS — S06.0X0S CONCUSSION WITHOUT LOSS OF CONSCIOUSNESS, SEQUELA (CMS-HCC): ICD-10-CM

## 2025-03-10 DIAGNOSIS — Z12.31 ENCOUNTER FOR SCREENING MAMMOGRAM FOR MALIGNANT NEOPLASM OF BREAST: ICD-10-CM

## 2025-03-10 DIAGNOSIS — R26.89 BALANCE DISORDER: ICD-10-CM

## 2025-03-10 DIAGNOSIS — M54.2 CERVICAL PAIN: ICD-10-CM
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
## 2025-03-10 DIAGNOSIS — M25.512 LEFT SHOULDER PAIN, UNSPECIFIED CHRONICITY: ICD-10-CM

## 2025-03-10 DIAGNOSIS — H53.149 PHOTOPHOBIA: ICD-10-CM

## 2025-03-10 DIAGNOSIS — S16.1XXD CERVICAL STRAIN, SUBSEQUENT ENCOUNTER: ICD-10-CM

## 2025-03-10 DIAGNOSIS — F40.298 PHONOPHOBIA: ICD-10-CM

## 2025-03-10 DIAGNOSIS — G44.319 ACUTE POST-TRAUMATIC HEADACHE, NOT INTRACTABLE: ICD-10-CM

## 2025-03-10 DIAGNOSIS — R42 DIZZINESS: ICD-10-CM

## 2025-03-10 DIAGNOSIS — M54.2 CERVICALGIA: Primary | ICD-10-CM

## 2025-03-10 PROCEDURE — 97035 APP MDLTY 1+ULTRASOUND EA 15: CPT | Mod: GP

## 2025-03-10 PROCEDURE — 97110 THERAPEUTIC EXERCISES: CPT | Mod: GP

## 2025-03-10 ASSESSMENT — PAIN SCALES - GENERAL: PAINLEVEL_OUTOF10: 6

## 2025-03-10 ASSESSMENT — PAIN - FUNCTIONAL ASSESSMENT: PAIN_FUNCTIONAL_ASSESSMENT: 0-10
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
## 2025-03-17 ENCOUNTER — TREATMENT (OUTPATIENT)
Dept: PHYSICAL THERAPY | Facility: CLINIC | Age: 75
End: 2025-03-17
Payer: MEDICARE

## 2025-03-17 DIAGNOSIS — M25.512 LEFT SHOULDER PAIN, UNSPECIFIED CHRONICITY: Primary | ICD-10-CM

## 2025-03-17 DIAGNOSIS — S16.1XXD CERVICAL STRAIN, SUBSEQUENT ENCOUNTER: ICD-10-CM

## 2025-03-17 DIAGNOSIS — H53.149 PHOTOPHOBIA: ICD-10-CM

## 2025-03-17 DIAGNOSIS — S06.0X0S CONCUSSION WITHOUT LOSS OF CONSCIOUSNESS, SEQUELA (CMS-HCC): ICD-10-CM

## 2025-03-17 DIAGNOSIS — H53.9 VISUAL DISORDER: ICD-10-CM

## 2025-03-17 DIAGNOSIS — F40.298 PHONOPHOBIA: ICD-10-CM

## 2025-03-17 DIAGNOSIS — R26.89 BALANCE DISORDER: ICD-10-CM

## 2025-03-17 DIAGNOSIS — R42 DIZZINESS: ICD-10-CM

## 2025-03-17 DIAGNOSIS — G44.319 ACUTE POST-TRAUMATIC HEADACHE, NOT INTRACTABLE: ICD-10-CM
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
## 2025-03-17 DIAGNOSIS — M54.2 CERVICALGIA: ICD-10-CM

## 2025-03-17 DIAGNOSIS — M54.2 CERVICAL PAIN: ICD-10-CM

## 2025-03-17 PROCEDURE — 97110 THERAPEUTIC EXERCISES: CPT | Mod: GP

## 2025-03-17 PROCEDURE — 97014 ELECTRIC STIMULATION THERAPY: CPT | Mod: GP

## 2025-03-17 ASSESSMENT — PAIN - FUNCTIONAL ASSESSMENT: PAIN_FUNCTIONAL_ASSESSMENT: 0-10

## 2025-03-17 ASSESSMENT — PAIN SCALES - GENERAL: PAINLEVEL_OUTOF10: 5 - MODERATE PAIN
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
## 2025-03-18 ENCOUNTER — HOSPITAL ENCOUNTER (OUTPATIENT)
Dept: RADIOLOGY | Facility: CLINIC | Age: 75
Discharge: HOME | End: 2025-03-18
Payer: MEDICARE

## 2025-03-18 VITALS — HEIGHT: 62 IN | BODY MASS INDEX: 32.94 KG/M2 | WEIGHT: 179 LBS

## 2025-03-18 DIAGNOSIS — Z12.31 ENCOUNTER FOR SCREENING MAMMOGRAM FOR MALIGNANT NEOPLASM OF BREAST: ICD-10-CM

## 2025-03-18 PROCEDURE — 77063 BREAST TOMOSYNTHESIS BI: CPT | Performed by: RADIOLOGY

## 2025-03-18 PROCEDURE — 77063 BREAST TOMOSYNTHESIS BI: CPT

## 2025-03-18 PROCEDURE — 77067 SCR MAMMO BI INCL CAD: CPT | Performed by: RADIOLOGY

## 2025-03-19 LAB
ALBUMIN SERPL-MCNC: 4.2 G/DL (ref 3.6–5.1)
ALP SERPL-CCNC: 81 U/L (ref 37–153)
ALT SERPL-CCNC: 10 U/L (ref 6–29)
ANION GAP SERPL CALCULATED.4IONS-SCNC: 9 MMOL/L (CALC) (ref 7–17)
AST SERPL-CCNC: 13 U/L (ref 10–35)
BASOPHILS # BLD AUTO: 20 CELLS/UL (ref 0–200)
BASOPHILS NFR BLD AUTO: 0.4 %
BILIRUB SERPL-MCNC: 0.5 MG/DL (ref 0.2–1.2)
BUN SERPL-MCNC: 19 MG/DL (ref 7–25)
CALCIUM SERPL-MCNC: 9 MG/DL (ref 8.6–10.4)
CHLORIDE SERPL-SCNC: 106 MMOL/L (ref 98–110)
CHOLEST SERPL-MCNC: 218 MG/DL
CHOLEST/HDLC SERPL: 3.8 (CALC)
CO2 SERPL-SCNC: 25 MMOL/L (ref 20–32)
CREAT SERPL-MCNC: 0.81 MG/DL (ref 0.6–1)
EGFRCR SERPLBLD CKD-EPI 2021: 75 ML/MIN/1.73M2
EOSINOPHIL # BLD AUTO: 137 CELLS/UL (ref 15–500)
EOSINOPHIL NFR BLD AUTO: 2.8 %
ERYTHROCYTE [DISTWIDTH] IN BLOOD BY AUTOMATED COUNT: 13.7 % (ref 11–15)
EST. AVERAGE GLUCOSE BLD GHB EST-MCNC: 200 MG/DL
EST. AVERAGE GLUCOSE BLD GHB EST-SCNC: 11.1 MMOL/L
GLUCOSE SERPL-MCNC: 171 MG/DL (ref 65–99)
HBA1C MFR BLD: 8.6 % OF TOTAL HGB
HCT VFR BLD AUTO: 42.5 % (ref 35–45)
HDLC SERPL-MCNC: 58 MG/DL
HGB BLD-MCNC: 14.1 G/DL (ref 11.7–15.5)
LDLC SERPL CALC-MCNC: 131 MG/DL (CALC)
LYMPHOCYTES # BLD AUTO: 1323 CELLS/UL (ref 850–3900)
LYMPHOCYTES NFR BLD AUTO: 27 %
MCH RBC QN AUTO: 32 PG (ref 27–33)
MCHC RBC AUTO-ENTMCNC: 33.2 G/DL (ref 32–36)
MCV RBC AUTO: 96.4 FL (ref 80–100)
MONOCYTES # BLD AUTO: 289 CELLS/UL (ref 200–950)
MONOCYTES NFR BLD AUTO: 5.9 %
NEUTROPHILS # BLD AUTO: 3131 CELLS/UL (ref 1500–7800)
NEUTROPHILS NFR BLD AUTO: 63.9 %
NONHDLC SERPL-MCNC: 160 MG/DL (CALC)
PLATELET # BLD AUTO: 173 THOUSAND/UL (ref 140–400)
PMV BLD REES-ECKER: 10.7 FL (ref 7.5–12.5)
POTASSIUM SERPL-SCNC: 3.8 MMOL/L (ref 3.5–5.3)
PROT SERPL-MCNC: 6.3 G/DL (ref 6.1–8.1)
RBC # BLD AUTO: 4.41 MILLION/UL (ref 3.8–5.1)
SODIUM SERPL-SCNC: 140 MMOL/L (ref 135–146)
TRIGL SERPL-MCNC: 157 MG/DL
WBC # BLD AUTO: 4.9 THOUSAND/UL (ref 3.8–10.8)

## 2025-03-20 ENCOUNTER — TELEPHONE (OUTPATIENT)
Dept: PRIMARY CARE | Facility: CLINIC | Age: 75
End: 2025-03-20

## 2025-03-20 ENCOUNTER — APPOINTMENT (OUTPATIENT)
Dept: PRIMARY CARE | Facility: CLINIC | Age: 75
End: 2025-03-20
Payer: COMMERCIAL

## 2025-03-20 VITALS
WEIGHT: 175 LBS | HEART RATE: 87 BPM | HEIGHT: 62 IN | SYSTOLIC BLOOD PRESSURE: 156 MMHG | OXYGEN SATURATION: 97 % | DIASTOLIC BLOOD PRESSURE: 82 MMHG | TEMPERATURE: 97.8 F | BODY MASS INDEX: 32.2 KG/M2

## 2025-03-20 DIAGNOSIS — Z00.00 ROUTINE GENERAL MEDICAL EXAMINATION AT HEALTH CARE FACILITY: Primary | ICD-10-CM

## 2025-03-20 DIAGNOSIS — Z12.11 COLON CANCER SCREENING: ICD-10-CM

## 2025-03-20 DIAGNOSIS — E11.9 TYPE 2 DIABETES MELLITUS WITHOUT COMPLICATION, WITHOUT LONG-TERM CURRENT USE OF INSULIN: ICD-10-CM

## 2025-03-20 DIAGNOSIS — I10 PRIMARY HYPERTENSION: ICD-10-CM

## 2025-03-20 DIAGNOSIS — Z74.09 IMPAIRED MOBILITY: ICD-10-CM

## 2025-03-20 DIAGNOSIS — E78.5 HYPERLIPIDEMIA, UNSPECIFIED HYPERLIPIDEMIA TYPE: ICD-10-CM

## 2025-03-20 DIAGNOSIS — E78.00 HYPERCHOLESTEROLEMIA: ICD-10-CM

## 2025-03-20 DIAGNOSIS — E11.9 TYPE 2 DIABETES MELLITUS WITHOUT COMPLICATION, WITHOUT LONG-TERM CURRENT USE OF INSULIN (MULTI): ICD-10-CM

## 2025-03-20 PROBLEM — G95.20 SPINAL CORD COMPRESSION (MULTI): Status: RESOLVED | Noted: 2023-09-04 | Resolved: 2025-03-20

## 2025-03-20 PROBLEM — K80.10 CHOLELITHIASIS AND CHOLECYSTITIS WITHOUT OBSTRUCTION: Status: RESOLVED | Noted: 2023-09-04 | Resolved: 2025-03-20

## 2025-03-20 PROBLEM — E87.6 HYPOKALEMIA: Status: RESOLVED | Noted: 2024-06-11 | Resolved: 2025-03-20

## 2025-03-20 PROCEDURE — 1159F MED LIST DOCD IN RCRD: CPT | Performed by: STUDENT IN AN ORGANIZED HEALTH CARE EDUCATION/TRAINING PROGRAM

## 2025-03-20 PROCEDURE — 3077F SYST BP >= 140 MM HG: CPT | Performed by: STUDENT IN AN ORGANIZED HEALTH CARE EDUCATION/TRAINING PROGRAM

## 2025-03-20 PROCEDURE — 3079F DIAST BP 80-89 MM HG: CPT | Performed by: STUDENT IN AN ORGANIZED HEALTH CARE EDUCATION/TRAINING PROGRAM

## 2025-03-20 PROCEDURE — 1170F FXNL STATUS ASSESSED: CPT | Performed by: STUDENT IN AN ORGANIZED HEALTH CARE EDUCATION/TRAINING PROGRAM

## 2025-03-20 PROCEDURE — 1158F ADVNC CARE PLAN TLK DOCD: CPT | Performed by: STUDENT IN AN ORGANIZED HEALTH CARE EDUCATION/TRAINING PROGRAM

## 2025-03-20 PROCEDURE — 99397 PER PM REEVAL EST PAT 65+ YR: CPT | Performed by: STUDENT IN AN ORGANIZED HEALTH CARE EDUCATION/TRAINING PROGRAM

## 2025-03-20 PROCEDURE — 1125F AMNT PAIN NOTED PAIN PRSNT: CPT | Performed by: STUDENT IN AN ORGANIZED HEALTH CARE EDUCATION/TRAINING PROGRAM

## 2025-03-20 PROCEDURE — 1123F ACP DISCUSS/DSCN MKR DOCD: CPT | Performed by: STUDENT IN AN ORGANIZED HEALTH CARE EDUCATION/TRAINING PROGRAM

## 2025-03-20 PROCEDURE — G0439 PPPS, SUBSEQ VISIT: HCPCS | Performed by: STUDENT IN AN ORGANIZED HEALTH CARE EDUCATION/TRAINING PROGRAM

## 2025-03-20 PROCEDURE — 99214 OFFICE O/P EST MOD 30 MIN: CPT | Performed by: STUDENT IN AN ORGANIZED HEALTH CARE EDUCATION/TRAINING PROGRAM

## 2025-03-20 PROCEDURE — 1036F TOBACCO NON-USER: CPT | Performed by: STUDENT IN AN ORGANIZED HEALTH CARE EDUCATION/TRAINING PROGRAM

## 2025-03-20 ASSESSMENT — ENCOUNTER SYMPTOMS
HEADACHES: 0
TROUBLE SWALLOWING: 0
FEVER: 0
LOSS OF SENSATION IN FEET: 0
NECK PAIN: 1
WHEEZING: 0
BLOOD IN STOOL: 0
SORE THROAT: 0
ABDOMINAL PAIN: 0
DEPRESSION: 0
PALPITATIONS: 0
OCCASIONAL FEELINGS OF UNSTEADINESS: 0
DIZZINESS: 1
BACK PAIN: 1
NAUSEA: 0
CONSTIPATION: 0
DIARRHEA: 0
VOMITING: 0
WEAKNESS: 0
POLYDIPSIA: 0
CHILLS: 0
SHORTNESS OF BREATH: 0

## 2025-03-20 ASSESSMENT — ACTIVITIES OF DAILY LIVING (ADL)
BATHING: INDEPENDENT
DRESSING: INDEPENDENT
GROCERY_SHOPPING: INDEPENDENT
MANAGING_FINANCES: INDEPENDENT
TAKING_MEDICATION: INDEPENDENT
DOING_HOUSEWORK: INDEPENDENT

## 2025-03-20 ASSESSMENT — PATIENT HEALTH QUESTIONNAIRE - PHQ9
SUM OF ALL RESPONSES TO PHQ9 QUESTIONS 1 AND 2: 1
10. IF YOU CHECKED OFF ANY PROBLEMS, HOW DIFFICULT HAVE THESE PROBLEMS MADE IT FOR YOU TO DO YOUR WORK, TAKE CARE OF THINGS AT HOME, OR GET ALONG WITH OTHER PEOPLE: NOT DIFFICULT AT ALL
1. LITTLE INTEREST OR PLEASURE IN DOING THINGS: NOT AT ALL
2. FEELING DOWN, DEPRESSED OR HOPELESS: SEVERAL DAYS

## 2025-03-20 ASSESSMENT — PAIN SCALES - GENERAL: PAINLEVEL_OUTOF10: 5

## 2025-03-20 NOTE — ASSESSMENT & PLAN NOTE
Uncontrolled. Under cardiology. Not willing to start medication at this time due to past intolerances to several medications. Will continue to monitor. Discussed lifestyle interventions.

## 2025-03-20 NOTE — ASSESSMENT & PLAN NOTE
Uncontrolled  Currently not on any medication   Declines statin  Lipid panel reviewed  Routine diabetic retinopathy screening up to date per pt - need report  Discussed dietary efforts and physical activity  She is willing to start medication. Options reviewed. She is open to starting Mounjaro/Ozempic if affordable. Does have reservations due to her multiple medication intolerances in the past.   Discussed CGM and she is interested  Referral placed to [facility] for GLP-1 and CGM initiation.   Follow up with me 3 mos
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
## 2025-03-20 NOTE — ASSESSMENT & PLAN NOTE
Lipid panel reviewed. Declines statin. Discussed lifestyle interventions including healthy diet  that includes lean proteins, vegetables, fruits, and whole grains.  Limit saturated fats, excess sodium, and processed foods.  Limit sugary drinks and sweets.  Moderate exercise at least 30 minutes per day, 5 days per week.

## 2025-03-23 ENCOUNTER — HOSPITAL ENCOUNTER (EMERGENCY)
Facility: HOSPITAL | Age: 75
Discharge: HOME | End: 2025-03-23
Payer: MEDICARE

## 2025-03-23 ENCOUNTER — APPOINTMENT (OUTPATIENT)
Dept: RADIOLOGY | Facility: HOSPITAL | Age: 75
End: 2025-03-23
Payer: MEDICARE

## 2025-03-23 VITALS
HEIGHT: 62 IN | RESPIRATION RATE: 18 BRPM | TEMPERATURE: 98.4 F | OXYGEN SATURATION: 95 % | BODY MASS INDEX: 32.01 KG/M2 | DIASTOLIC BLOOD PRESSURE: 73 MMHG | HEART RATE: 90 BPM | WEIGHT: 173.94 LBS | SYSTOLIC BLOOD PRESSURE: 124 MMHG

## 2025-03-23 DIAGNOSIS — R11.2 NAUSEA AND VOMITING, UNSPECIFIED VOMITING TYPE: Primary | ICD-10-CM

## 2025-03-23 DIAGNOSIS — N39.0 ACUTE LOWER URINARY TRACT INFECTION: ICD-10-CM

## 2025-03-23 DIAGNOSIS — R19.7 DIARRHEA, UNSPECIFIED TYPE: ICD-10-CM

## 2025-03-23 LAB
ALBUMIN SERPL BCP-MCNC: 4 G/DL (ref 3.4–5)
ALP SERPL-CCNC: 73 U/L (ref 33–136)
ALT SERPL W P-5'-P-CCNC: 27 U/L (ref 7–45)
ANION GAP SERPL CALCULATED.3IONS-SCNC: 14 MMOL/L (ref 10–20)
APPEARANCE UR: ABNORMAL
AST SERPL W P-5'-P-CCNC: 32 U/L (ref 9–39)
BACTERIA #/AREA URNS AUTO: ABNORMAL /HPF
BASOPHILS # BLD AUTO: 0.01 X10*3/UL (ref 0–0.1)
BASOPHILS NFR BLD AUTO: 0.2 %
BILIRUB SERPL-MCNC: 0.6 MG/DL (ref 0–1.2)
BILIRUB UR STRIP.AUTO-MCNC: NEGATIVE MG/DL
BUN SERPL-MCNC: 17 MG/DL (ref 6–23)
CALCIUM SERPL-MCNC: 9.1 MG/DL (ref 8.6–10.3)
CHLORIDE SERPL-SCNC: 106 MMOL/L (ref 98–107)
CO2 SERPL-SCNC: 20 MMOL/L (ref 21–32)
COLOR UR: YELLOW
CREAT SERPL-MCNC: 0.82 MG/DL (ref 0.5–1.05)
EGFRCR SERPLBLD CKD-EPI 2021: 74 ML/MIN/1.73M*2
EOSINOPHIL # BLD AUTO: 0 X10*3/UL (ref 0–0.4)
EOSINOPHIL NFR BLD AUTO: 0 %
ERYTHROCYTE [DISTWIDTH] IN BLOOD BY AUTOMATED COUNT: 13.1 % (ref 11.5–14.5)
GLUCOSE SERPL-MCNC: 211 MG/DL (ref 74–99)
GLUCOSE UR STRIP.AUTO-MCNC: ABNORMAL MG/DL
HCT VFR BLD AUTO: 43.3 % (ref 36–46)
HGB BLD-MCNC: 14.8 G/DL (ref 12–16)
IMM GRANULOCYTES # BLD AUTO: 0.02 X10*3/UL (ref 0–0.5)
IMM GRANULOCYTES NFR BLD AUTO: 0.4 % (ref 0–0.9)
KETONES UR STRIP.AUTO-MCNC: ABNORMAL MG/DL
LEUKOCYTE ESTERASE UR QL STRIP.AUTO: ABNORMAL
LIPASE SERPL-CCNC: 9 U/L (ref 9–82)
LYMPHOCYTES # BLD AUTO: 0.44 X10*3/UL (ref 0.8–3)
LYMPHOCYTES NFR BLD AUTO: 8.9 %
MCH RBC QN AUTO: 31.1 PG (ref 26–34)
MCHC RBC AUTO-ENTMCNC: 34.2 G/DL (ref 32–36)
MCV RBC AUTO: 91 FL (ref 80–100)
MONOCYTES # BLD AUTO: 0.16 X10*3/UL (ref 0.05–0.8)
MONOCYTES NFR BLD AUTO: 3.2 %
MUCOUS THREADS #/AREA URNS AUTO: ABNORMAL /LPF
NEUTROPHILS # BLD AUTO: 4.33 X10*3/UL (ref 1.6–5.5)
NEUTROPHILS NFR BLD AUTO: 87.3 %
NITRITE UR QL STRIP.AUTO: NEGATIVE
NRBC BLD-RTO: 0 /100 WBCS (ref 0–0)
PH UR STRIP.AUTO: 5.5 [PH]
PLATELET # BLD AUTO: 161 X10*3/UL (ref 150–450)
POTASSIUM SERPL-SCNC: 3.6 MMOL/L (ref 3.5–5.3)
PROT SERPL-MCNC: 6.4 G/DL (ref 6.4–8.2)
PROT UR STRIP.AUTO-MCNC: ABNORMAL MG/DL
RBC # BLD AUTO: 4.76 X10*6/UL (ref 4–5.2)
RBC # UR STRIP.AUTO: ABNORMAL MG/DL
RBC #/AREA URNS AUTO: >20 /HPF
SODIUM SERPL-SCNC: 136 MMOL/L (ref 136–145)
SP GR UR STRIP.AUTO: 1.03
SQUAMOUS #/AREA URNS AUTO: ABNORMAL /HPF
UROBILINOGEN UR STRIP.AUTO-MCNC: NORMAL MG/DL
WBC # BLD AUTO: 5 X10*3/UL (ref 4.4–11.3)
WBC #/AREA URNS AUTO: >50 /HPF

## 2025-03-23 PROCEDURE — 2500000001 HC RX 250 WO HCPCS SELF ADMINISTERED DRUGS (ALT 637 FOR MEDICARE OP)

## 2025-03-23 PROCEDURE — 74177 CT ABD & PELVIS W/CONTRAST: CPT | Performed by: RADIOLOGY

## 2025-03-23 PROCEDURE — 96361 HYDRATE IV INFUSION ADD-ON: CPT

## 2025-03-23 PROCEDURE — 99285 EMERGENCY DEPT VISIT HI MDM: CPT | Mod: 25

## 2025-03-23 PROCEDURE — 87086 URINE CULTURE/COLONY COUNT: CPT | Mod: TRILAB

## 2025-03-23 PROCEDURE — 2500000004 HC RX 250 GENERAL PHARMACY W/ HCPCS (ALT 636 FOR OP/ED)

## 2025-03-23 PROCEDURE — 36415 COLL VENOUS BLD VENIPUNCTURE: CPT

## 2025-03-23 PROCEDURE — 80053 COMPREHEN METABOLIC PANEL: CPT

## 2025-03-23 PROCEDURE — 74177 CT ABD & PELVIS W/CONTRAST: CPT

## 2025-03-23 PROCEDURE — 96375 TX/PRO/DX INJ NEW DRUG ADDON: CPT

## 2025-03-23 PROCEDURE — 83690 ASSAY OF LIPASE: CPT

## 2025-03-23 PROCEDURE — 96374 THER/PROPH/DIAG INJ IV PUSH: CPT | Mod: 59

## 2025-03-23 PROCEDURE — 2550000001 HC RX 255 CONTRASTS

## 2025-03-23 PROCEDURE — 85025 COMPLETE CBC W/AUTO DIFF WBC: CPT

## 2025-03-23 PROCEDURE — 81001 URINALYSIS AUTO W/SCOPE: CPT

## 2025-03-23 RX ORDER — ONDANSETRON HYDROCHLORIDE 2 MG/ML
4 INJECTION, SOLUTION INTRAVENOUS ONCE
Status: COMPLETED | OUTPATIENT
Start: 2025-03-23 | End: 2025-03-23

## 2025-03-23 RX ORDER — ONDANSETRON 4 MG/1
4 TABLET, ORALLY DISINTEGRATING ORAL EVERY 8 HOURS PRN
Qty: 20 TABLET | Refills: 0 | Status: SHIPPED | OUTPATIENT
Start: 2025-03-23 | End: 2025-03-30

## 2025-03-23 RX ORDER — CIPROFLOXACIN 250 MG/1
250 TABLET, FILM COATED ORAL EVERY 12 HOURS
Qty: 6 TABLET | Refills: 0 | Status: SHIPPED | OUTPATIENT
Start: 2025-03-23 | End: 2025-03-26

## 2025-03-23 RX ORDER — FAMOTIDINE 10 MG/ML
20 INJECTION, SOLUTION INTRAVENOUS ONCE
Status: COMPLETED | OUTPATIENT
Start: 2025-03-23 | End: 2025-03-23

## 2025-03-23 RX ORDER — CIPROFLOXACIN 500 MG/1
250 TABLET ORAL ONCE
Status: COMPLETED | OUTPATIENT
Start: 2025-03-23 | End: 2025-03-23

## 2025-03-23 RX ADMIN — IOHEXOL 75 ML: 350 INJECTION, SOLUTION INTRAVENOUS at 12:45

## 2025-03-23 RX ADMIN — CIPROFLOXACIN HYDROCHLORIDE 250 MG: 500 TABLET, FILM COATED ORAL at 13:32

## 2025-03-23 RX ADMIN — ONDANSETRON 4 MG: 2 INJECTION, SOLUTION INTRAMUSCULAR; INTRAVENOUS at 11:57

## 2025-03-23 RX ADMIN — SODIUM CHLORIDE 1000 ML: 900 INJECTION, SOLUTION INTRAVENOUS at 11:52

## 2025-03-23 RX ADMIN — FAMOTIDINE 20 MG: 10 INJECTION, SOLUTION INTRAVENOUS at 11:57

## 2025-03-23 ASSESSMENT — PAIN DESCRIPTION - PROGRESSION: CLINICAL_PROGRESSION: NOT CHANGED

## 2025-03-23 ASSESSMENT — PAIN - FUNCTIONAL ASSESSMENT: PAIN_FUNCTIONAL_ASSESSMENT: 0-10

## 2025-03-23 ASSESSMENT — PAIN DESCRIPTION - ONSET: ONSET: SUDDEN

## 2025-03-23 ASSESSMENT — PAIN DESCRIPTION - FREQUENCY: FREQUENCY: INTERMITTENT

## 2025-03-23 ASSESSMENT — PAIN DESCRIPTION - DESCRIPTORS: DESCRIPTORS: CRAMPING

## 2025-03-23 ASSESSMENT — PAIN DESCRIPTION - PAIN TYPE: TYPE: ACUTE PAIN

## 2025-03-23 ASSESSMENT — PAIN SCALES - GENERAL: PAINLEVEL_OUTOF10: 6

## 2025-03-23 ASSESSMENT — PAIN DESCRIPTION - LOCATION: LOCATION: ABDOMEN

## 2025-03-23 NOTE — DISCHARGE INSTRUCTIONS
Use Imodium as needed for diarrhea.  Increase oral hydration.  Take Zofran as needed.  Take ciprofloxacin.  Be sure to take all medications, over the counter medications or prescription medications only as directed.    Be sure to follow up as directed in 1-2 days. All of the details of your follow up instructions are detailed in the follow up section of this packet.    If you are being discharged with any pains medications or muscle relaxers (norco, Vicodin, hydrocodone products, Percocet, oxycodone products, flexeril, cyclobenzaprine, robaxin, norflex, brand or generic, or any other pain controlling medications with the exception of Ibuprofen and regular Tylenol, do not drive or operate machinery, climb ladders or participate in any activity that could potentially put yourself or others at risk should you get dizzy, or be/feel impaired at all.    Return to emergency room without delay for ANY new or worsening pains or for any other symptoms or concerns. Return with worsening pains, nausea, vomiting, trouble breathing, palpitations, shortness of breath, inability to pass stool or urine, loss of control of stool or urine, any numbness or tingling (that is not normal for you), uncontrolled fevers, the passing of blood or other material in stool or urine, rashes, pains or for any other symptoms or concerns you may have. You are always welcome to return to the ER at any time for any reason or for any other concerns you may have.

## 2025-03-23 NOTE — ED PROVIDER NOTES
HPI   Chief Complaint   Patient presents with    Diarrhea     Since Friday I have had diarrhea vomiting nausea chills cramping in my abd I feel ho and chills I have had issues in the past with shrimp and I did have shrimp Friday        Patient is a 74-year-old female with past medical history of prediabetes, hypertension, gallbladder removal presenting with concerns for nausea and vomiting.  She reportedly had shrimp several days ago and this has caused her to have food poisoning in the past.  She states that she began having nausea and vomiting shortly after.  States there is associated diarrhea.  Endorses that yesterday he felt unwell and had persistent nausea, vomiting, diarrhea.  States she has had no vomiting today but has had persistent diarrhea.  She feels very dehydrated.  She believes that she has food poisoning.  Denies any sick contacts.  Endorses.  Bowel: Slight epigastric abdominal tenderness.  Describes it as a dull, crampy feeling.  Quantifies a very low at around 2-4 out of 10.  Patient denies fevers, chills, cough, sore throat, runny nose, chest pain, shortness of breath, or urinary complaints.              Patient History   Past Medical History:   Diagnosis Date    Abdominal pain 06/27/2022    Abnormal urinalysis 11/24/2020    Accidental fall 06/11/2024    Acute urinary tract infection 03/30/2021    Allergic     Anxiety     Arthritis     Cervical vertebral fusion 10/15/2020    [id number] cervical decompression and fusion with allograft spacer    Chest wall pain 06/11/2024    Cholelithiasis and cholecystitis without obstruction 09/04/2023    Contact with and (suspected) exposure to covid-19 04/28/2022    Contusion of chest 06/11/2024    Diabetes mellitus (Multi) Unknown    Dysuria 05/07/2018    Gall stone 11/27/2019    GERD (gastroesophageal reflux disease)     Hypertension Unknown    Injury of head 06/11/2024    Nausea 06/27/2022    Pain of foot 07/06/2020    Urinary tract infection      Past  Surgical History:   Procedure Laterality Date    CHOLECYSTECTOMY      HYSTERECTOMY  Sept 1979    TONSILLECTOMY       Family History   Problem Relation Name Age of Onset    Other (Shy-Drager) Mother Dorinda Urban     Tuberculosis Mother Dorinda Urban     Heart disease Father Enrique Sierra (Father)     Hypertension Father Enrique Sierra (Father)     Atrial fibrillation Father Enrique Sierra (Father)     Abnormal EKG Father Enrique Sierra (Father)     Angina Father Enrique Sierra (Father)     Diabetes type II Father Enrique Sierra (Father)     Heart attack Father Enrique Sierra (Father)     Heart murmur Father Enrique Sierra (Father)     Cancer Other Uncle      Social History     Tobacco Use    Smoking status: Never    Smokeless tobacco: Never   Vaping Use    Vaping status: Never Used   Substance Use Topics    Alcohol use: Never    Drug use: Never       Physical Exam   ED Triage Vitals [03/23/25 1118]   Temperature Heart Rate Respirations BP   36.9 °C (98.4 °F) (!) 112 18 152/82      Pulse Ox Temp Source Heart Rate Source Patient Position   98 % Oral Monitor Sitting      BP Location FiO2 (%)     Left arm --       Physical Exam  Vitals and nursing note reviewed.   Constitutional:       Appearance: She is well-developed.      Comments: Awake, laying in examination bed   HENT:      Head: Normocephalic and atraumatic.      Nose: Nose normal.      Mouth/Throat:      Mouth: Mucous membranes are moist.      Pharynx: Oropharynx is clear.   Eyes:      Extraocular Movements: Extraocular movements intact.      Conjunctiva/sclera: Conjunctivae normal.      Pupils: Pupils are equal, round, and reactive to light.   Cardiovascular:      Rate and Rhythm: Regular rhythm. Tachycardia present.      Pulses: Normal pulses.      Heart sounds: Normal heart sounds. No murmur heard.  Pulmonary:      Effort: Pulmonary effort is normal. No respiratory distress.      Breath sounds: Normal breath sounds.   Abdominal:      General: Abdomen is flat.      Palpations: Abdomen is soft.       Tenderness: There is abdominal tenderness.      Comments: Periumbilical, epigastric abdominal tenderness to palpation   Musculoskeletal:         General: No swelling. Normal range of motion.      Cervical back: Normal range of motion and neck supple.   Skin:     General: Skin is warm and dry.      Capillary Refill: Capillary refill takes less than 2 seconds.   Neurological:      General: No focal deficit present.      Mental Status: She is alert and oriented to person, place, and time.   Psychiatric:         Mood and Affect: Mood normal.         Behavior: Behavior normal.           ED Course & MDM   Diagnoses as of 03/24/25 1722   Nausea and vomiting, unspecified vomiting type   Diarrhea, unspecified type   Acute lower urinary tract infection                 No data recorded     Edwards Coma Scale Score: 15 (03/23/25 1111 : Frankie Chu, EMT)                           Medical Decision Making  Patient is a 74 old female past medical history of prediabetes, hypertension, gallbladder, presenting with concerns for nausea and vomiting.  Lab work, urine, imaging ordered.  IV fluids, Zofran, Pepcid ordered.  Conditions considered include but are not limited: Norovirus, food poisoning, intra-abdominal pathology.    Attending physician was available for consultation on this patient.  CBC is without leukocytosis or anemia.  CMP without significant electrolyte abnormality or renal impairment.  UA with micro is concerning for infection.  Patient is given a dose of ciprofloxacin while in the emergency department.  Lipase within normal limits.  CT scan does show a nonspecific fluid in the cecum and ascending colon.    I believe this patient is at low risk for complication, and a disposition of discharge is acceptable.  Return to the Emergency Department if new or worsening symptoms including headache, fever, chills, chest pain, shortness of breath, syncope, near syncope, abdominal pain, nausea, vomiting,  diarrhea, or  worsening pain.  They discussed with patient that she can utilize Imodium as needed.  I discussed increasing oral hydration and to take antibiotics until completion.  Patient is agreeable to this and to follow with primary care in the next several days.    Portions of this note made with Dragon software, please be mindful of potential grammatical errors.      Medications   sodium chloride 0.9 % bolus 1,000 mL (0 mL intravenous Stopped 3/23/25 1252)   ondansetron (Zofran) injection 4 mg (4 mg intravenous Given 3/23/25 1157)   famotidine PF (Pepcid) injection 20 mg (20 mg intravenous Given 3/23/25 1157)   iohexol (OMNIPaque) 350 mg iodine/mL solution 75 mL (75 mL intravenous Given 3/23/25 1245)   ciprofloxacin (Cipro) tablet 250 mg (250 mg oral Given 3/23/25 1332)     Labs Reviewed   CBC WITH AUTO DIFFERENTIAL - Abnormal       Result Value    WBC 5.0      nRBC 0.0      RBC 4.76      Hemoglobin 14.8      Hematocrit 43.3      MCV 91      MCH 31.1      MCHC 34.2      RDW 13.1      Platelets 161      Neutrophils % 87.3      Immature Granulocytes %, Automated 0.4      Lymphocytes % 8.9      Monocytes % 3.2      Eosinophils % 0.0      Basophils % 0.2      Neutrophils Absolute 4.33      Immature Granulocytes Absolute, Automated 0.02      Lymphocytes Absolute 0.44 (*)     Monocytes Absolute 0.16      Eosinophils Absolute 0.00      Basophils Absolute 0.01     COMPREHENSIVE METABOLIC PANEL - Abnormal    Glucose 211 (*)     Sodium 136      Potassium 3.6      Chloride 106      Bicarbonate 20 (*)     Anion Gap 14      Urea Nitrogen 17      Creatinine 0.82      eGFR 74      Calcium 9.1      Albumin 4.0      Alkaline Phosphatase 73      Total Protein 6.4      AST 32      Bilirubin, Total 0.6      ALT 27     URINALYSIS WITH REFLEX CULTURE AND MICROSCOPIC - Abnormal    Color, Urine Yellow      Appearance, Urine Ex.Turbid (*)     Specific Gravity, Urine 1.033      pH, Urine 5.5      Protein, Urine 50 (1+) (*)     Glucose, Urine 200  (2+) (*)     Blood, Urine 0.06 (1+) (*)     Ketones, Urine TRACE (*)     Bilirubin, Urine NEGATIVE      Urobilinogen, Urine Normal      Nitrite, Urine NEGATIVE      Leukocyte Esterase, Urine 500 Maryellen/uL (*)    MICROSCOPIC ONLY, URINE - Abnormal    WBC, Urine >50 (*)     RBC, Urine >20 (*)     Squamous Epithelial Cells, Urine 26-50 (1+)      Bacteria, Urine 1+ (*)     Mucus, Urine 4+     URINE CULTURE - Normal    Urine Culture        Value: Growth indicates contamination with periurethral az. Repeat culture if clinically indicated.   LIPASE - Normal    Lipase 9      Narrative:     Venipuncture immediately after or during the administration of Metamizole may lead to falsely low results. Testing should be performed immediately prior to Metamizole dosing.   URINALYSIS WITH REFLEX CULTURE AND MICROSCOPIC    Narrative:     The following orders were created for panel order Urinalysis with Reflex Culture and Microscopic.  Procedure                               Abnormality         Status                     ---------                               -----------         ------                     Urinalysis with Reflex C...[[id number]]  Abnormal            Final result               Extra Urine Gray Tube[[id number]]                                                         Please view results for these tests on the individual orders.   EXTRA URINE GRAY TUBE     CT abdomen pelvis w IV contrast   Final Result   There is nonspecific fluid in the cecum and ascending colon. No   associated wall thickening or adjacent fat stranding. Mild   right-sided viral colitis is a remote possibility but unlikely.   Clinical correlation is needed.        There is left colon and sigmoid diverticulosis without acute   associated inflammation. No CT evidence of bowel obstruction or   perforation.        Arthritic changes in the lumbosacral spine and distal thoracic spine   as described.        Rather prominent fatty atrophy of the pancreas.         Previous hysterectomy. Previous cholecystectomy.             Mild splenomegaly. 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        Stable findings of prior granulomatous disease.        Hiatal hernia.        Scant small bilateral renal parapelvic cysts.             MACRO:   None        Signed by: Emile Cam 3/23/2025 1:47 PM   Dictation workstation:   [id number]            Procedure  Procedures     Chinmay Hardy PA-C  03/24/25 1726

## 2025-03-24 LAB — BACTERIA UR CULT: NORMAL

## 2025-03-25 ENCOUNTER — APPOINTMENT (OUTPATIENT)
Dept: CARDIOLOGY | Facility: HOSPITAL | Age: 75
End: 2025-03-25
Payer: MEDICARE

## 2025-03-25 ENCOUNTER — HOSPITAL ENCOUNTER (EMERGENCY)
Facility: HOSPITAL | Age: 75
Discharge: HOME | End: 2025-03-25
Attending: STUDENT IN AN ORGANIZED HEALTH CARE EDUCATION/TRAINING PROGRAM
Payer: MEDICARE

## 2025-03-25 ENCOUNTER — APPOINTMENT (OUTPATIENT)
Dept: RADIOLOGY | Facility: HOSPITAL | Age: 75
End: 2025-03-25
Payer: MEDICARE

## 2025-03-25 VITALS
RESPIRATION RATE: 18 BRPM | HEART RATE: 59 BPM | SYSTOLIC BLOOD PRESSURE: 109 MMHG | OXYGEN SATURATION: 100 % | HEIGHT: 62 IN | DIASTOLIC BLOOD PRESSURE: 87 MMHG | WEIGHT: 170 LBS | TEMPERATURE: 97.7 F | BODY MASS INDEX: 31.28 KG/M2

## 2025-03-25 DIAGNOSIS — R19.7 VOMITING AND DIARRHEA: Primary | ICD-10-CM

## 2025-03-25 DIAGNOSIS — R11.10 VOMITING AND DIARRHEA: Primary | ICD-10-CM

## 2025-03-25 LAB
ALBUMIN SERPL BCP-MCNC: 4.1 G/DL (ref 3.4–5)
ALP SERPL-CCNC: 75 U/L (ref 33–136)
ALT SERPL W P-5'-P-CCNC: 24 U/L (ref 7–45)
ANION GAP SERPL CALCULATED.3IONS-SCNC: 15 MMOL/L (ref 10–20)
AST SERPL W P-5'-P-CCNC: 23 U/L (ref 9–39)
BASOPHILS # BLD AUTO: 0.01 X10*3/UL (ref 0–0.1)
BASOPHILS NFR BLD AUTO: 0.2 %
BILIRUB SERPL-MCNC: 0.5 MG/DL (ref 0–1.2)
BNP SERPL-MCNC: 21 PG/ML (ref 0–99)
BUN SERPL-MCNC: 12 MG/DL (ref 6–23)
C DIF TOX TCDA+TCDB STL QL NAA+PROBE: NOT DETECTED
CALCIUM SERPL-MCNC: 9.3 MG/DL (ref 8.6–10.3)
CARDIAC TROPONIN I PNL SERPL HS: 4 NG/L (ref 0–13)
CARDIAC TROPONIN I PNL SERPL HS: 4 NG/L (ref 0–13)
CHLORIDE SERPL-SCNC: 109 MMOL/L (ref 98–107)
CO2 SERPL-SCNC: 17 MMOL/L (ref 21–32)
CREAT SERPL-MCNC: 0.95 MG/DL (ref 0.5–1.05)
EGFRCR SERPLBLD CKD-EPI 2021: 62 ML/MIN/1.73M*2
EOSINOPHIL # BLD AUTO: 0.07 X10*3/UL (ref 0–0.4)
EOSINOPHIL NFR BLD AUTO: 1.1 %
ERYTHROCYTE [DISTWIDTH] IN BLOOD BY AUTOMATED COUNT: 13.2 % (ref 11.5–14.5)
FLUAV RNA RESP QL NAA+PROBE: NOT DETECTED
FLUBV RNA RESP QL NAA+PROBE: NOT DETECTED
GLUCOSE SERPL-MCNC: 158 MG/DL (ref 74–99)
HCT VFR BLD AUTO: 42.7 % (ref 36–46)
HGB BLD-MCNC: 14.9 G/DL (ref 12–16)
IMM GRANULOCYTES # BLD AUTO: 0.02 X10*3/UL (ref 0–0.5)
IMM GRANULOCYTES NFR BLD AUTO: 0.3 % (ref 0–0.9)
LYMPHOCYTES # BLD AUTO: 1.07 X10*3/UL (ref 0.8–3)
LYMPHOCYTES NFR BLD AUTO: 16.6 %
MAGNESIUM SERPL-MCNC: 1.75 MG/DL (ref 1.6–2.4)
MCH RBC QN AUTO: 31.6 PG (ref 26–34)
MCHC RBC AUTO-ENTMCNC: 34.9 G/DL (ref 32–36)
MCV RBC AUTO: 91 FL (ref 80–100)
MONOCYTES # BLD AUTO: 0.36 X10*3/UL (ref 0.05–0.8)
MONOCYTES NFR BLD AUTO: 5.6 %
NEUTROPHILS # BLD AUTO: 4.91 X10*3/UL (ref 1.6–5.5)
NEUTROPHILS NFR BLD AUTO: 76.2 %
NRBC BLD-RTO: 0 /100 WBCS (ref 0–0)
PLATELET # BLD AUTO: 174 X10*3/UL (ref 150–450)
POTASSIUM SERPL-SCNC: 3.2 MMOL/L (ref 3.5–5.3)
PROT SERPL-MCNC: 6.5 G/DL (ref 6.4–8.2)
RBC # BLD AUTO: 4.71 X10*6/UL (ref 4–5.2)
SARS-COV-2 RNA RESP QL NAA+PROBE: NOT DETECTED
SODIUM SERPL-SCNC: 138 MMOL/L (ref 136–145)
WBC # BLD AUTO: 6.4 X10*3/UL (ref 4.4–11.3)

## 2025-03-25 PROCEDURE — 87636 SARSCOV2 & INF A&B AMP PRB: CPT

## 2025-03-25 PROCEDURE — 96372 THER/PROPH/DIAG INJ SC/IM: CPT | Performed by: STUDENT IN AN ORGANIZED HEALTH CARE EDUCATION/TRAINING PROGRAM

## 2025-03-25 PROCEDURE — 87506 IADNA-DNA/RNA PROBE TQ 6-11: CPT | Mod: TRILAB

## 2025-03-25 PROCEDURE — 93005 ELECTROCARDIOGRAM TRACING: CPT

## 2025-03-25 PROCEDURE — 84484 ASSAY OF TROPONIN QUANT: CPT

## 2025-03-25 PROCEDURE — 83735 ASSAY OF MAGNESIUM: CPT

## 2025-03-25 PROCEDURE — 96375 TX/PRO/DX INJ NEW DRUG ADDON: CPT

## 2025-03-25 PROCEDURE — 84075 ASSAY ALKALINE PHOSPHATASE: CPT

## 2025-03-25 PROCEDURE — 2550000001 HC RX 255 CONTRASTS

## 2025-03-25 PROCEDURE — 87493 C DIFF AMPLIFIED PROBE: CPT | Mod: 59

## 2025-03-25 PROCEDURE — 87636 SARSCOV2 & INF A&B AMP PRB: CPT | Performed by: STUDENT IN AN ORGANIZED HEALTH CARE EDUCATION/TRAINING PROGRAM

## 2025-03-25 PROCEDURE — 99285 EMERGENCY DEPT VISIT HI MDM: CPT | Mod: 25 | Performed by: STUDENT IN AN ORGANIZED HEALTH CARE EDUCATION/TRAINING PROGRAM

## 2025-03-25 PROCEDURE — 2500000004 HC RX 250 GENERAL PHARMACY W/ HCPCS (ALT 636 FOR OP/ED)

## 2025-03-25 PROCEDURE — 36415 COLL VENOUS BLD VENIPUNCTURE: CPT

## 2025-03-25 PROCEDURE — 74177 CT ABD & PELVIS W/CONTRAST: CPT | Performed by: RADIOLOGY

## 2025-03-25 PROCEDURE — 96374 THER/PROPH/DIAG INJ IV PUSH: CPT | Mod: 59

## 2025-03-25 PROCEDURE — 2500000004 HC RX 250 GENERAL PHARMACY W/ HCPCS (ALT 636 FOR OP/ED): Performed by: STUDENT IN AN ORGANIZED HEALTH CARE EDUCATION/TRAINING PROGRAM

## 2025-03-25 PROCEDURE — 2500000002 HC RX 250 W HCPCS SELF ADMINISTERED DRUGS (ALT 637 FOR MEDICARE OP, ALT 636 FOR OP/ED): Mod: MUE

## 2025-03-25 PROCEDURE — 74177 CT ABD & PELVIS W/CONTRAST: CPT

## 2025-03-25 PROCEDURE — 85025 COMPLETE CBC W/AUTO DIFF WBC: CPT

## 2025-03-25 PROCEDURE — 85025 COMPLETE CBC W/AUTO DIFF WBC: CPT | Performed by: STUDENT IN AN ORGANIZED HEALTH CARE EDUCATION/TRAINING PROGRAM

## 2025-03-25 PROCEDURE — 96361 HYDRATE IV INFUSION ADD-ON: CPT

## 2025-03-25 PROCEDURE — 83880 ASSAY OF NATRIURETIC PEPTIDE: CPT

## 2025-03-25 PROCEDURE — 80053 COMPREHEN METABOLIC PANEL: CPT | Performed by: STUDENT IN AN ORGANIZED HEALTH CARE EDUCATION/TRAINING PROGRAM

## 2025-03-25 RX ORDER — ONDANSETRON HYDROCHLORIDE 2 MG/ML
4 INJECTION, SOLUTION INTRAVENOUS ONCE
Status: COMPLETED | OUTPATIENT
Start: 2025-03-25 | End: 2025-03-25

## 2025-03-25 RX ORDER — METOCLOPRAMIDE 10 MG/1
10 TABLET ORAL EVERY 6 HOURS
Qty: 28 TABLET | Refills: 0 | Status: SHIPPED | OUTPATIENT
Start: 2025-03-25 | End: 2025-04-01

## 2025-03-25 RX ORDER — DICYCLOMINE HYDROCHLORIDE 10 MG/ML
20 INJECTION INTRAMUSCULAR ONCE
Status: COMPLETED | OUTPATIENT
Start: 2025-03-25 | End: 2025-03-25

## 2025-03-25 RX ORDER — DICYCLOMINE HYDROCHLORIDE 20 MG/1
20 TABLET ORAL 2 TIMES DAILY
Qty: 20 TABLET | Refills: 0 | Status: SHIPPED | OUTPATIENT
Start: 2025-03-25 | End: 2025-04-04

## 2025-03-25 RX ORDER — POTASSIUM CHLORIDE 20 MEQ/1
40 TABLET, EXTENDED RELEASE ORAL ONCE
Status: COMPLETED | OUTPATIENT
Start: 2025-03-25 | End: 2025-03-25

## 2025-03-25 RX ORDER — PROCHLORPERAZINE EDISYLATE 5 MG/ML
5 INJECTION INTRAMUSCULAR; INTRAVENOUS ONCE
Status: COMPLETED | OUTPATIENT
Start: 2025-03-25 | End: 2025-03-25

## 2025-03-25 RX ADMIN — SODIUM CHLORIDE, SODIUM LACTATE, POTASSIUM CHLORIDE, AND CALCIUM CHLORIDE 500 ML: 600; 310; 30; 20 INJECTION, SOLUTION INTRAVENOUS at 17:53

## 2025-03-25 RX ADMIN — ONDANSETRON 4 MG: 2 INJECTION, SOLUTION INTRAMUSCULAR; INTRAVENOUS at 15:36

## 2025-03-25 RX ADMIN — POTASSIUM CHLORIDE 40 MEQ: 1500 TABLET, EXTENDED RELEASE ORAL at 17:53

## 2025-03-25 RX ADMIN — DICYCLOMINE HYDROCHLORIDE 20 MG: 10 INJECTION, SOLUTION INTRAMUSCULAR at 19:25

## 2025-03-25 RX ADMIN — SODIUM CHLORIDE 1000 ML: 900 INJECTION, SOLUTION INTRAVENOUS at 15:01

## 2025-03-25 RX ADMIN — IOHEXOL 75 ML: 350 INJECTION, SOLUTION INTRAVENOUS at 16:23

## 2025-03-25 RX ADMIN — PROCHLORPERAZINE EDISYLATE 5 MG: 5 INJECTION INTRAMUSCULAR; INTRAVENOUS at 19:25

## 2025-03-25 ASSESSMENT — COLUMBIA-SUICIDE SEVERITY RATING SCALE - C-SSRS
1. IN THE PAST MONTH, HAVE YOU WISHED YOU WERE DEAD OR WISHED YOU COULD GO TO SLEEP AND NOT WAKE UP?: NO
6. HAVE YOU EVER DONE ANYTHING, STARTED TO DO ANYTHING, OR PREPARED TO DO ANYTHING TO END YOUR LIFE?: NO
2. HAVE YOU ACTUALLY HAD ANY THOUGHTS OF KILLING YOURSELF?: NO

## 2025-03-25 ASSESSMENT — PAIN DESCRIPTION - PROGRESSION: CLINICAL_PROGRESSION: NOT CHANGED

## 2025-03-25 ASSESSMENT — PAIN DESCRIPTION - ONSET: ONSET: SUDDEN

## 2025-03-25 ASSESSMENT — PAIN DESCRIPTION - FREQUENCY: FREQUENCY: CONSTANT/CONTINUOUS

## 2025-03-25 ASSESSMENT — PAIN SCALES - GENERAL: PAINLEVEL_OUTOF10: 4

## 2025-03-25 ASSESSMENT — PAIN DESCRIPTION - LOCATION: LOCATION: ABDOMEN

## 2025-03-25 ASSESSMENT — PAIN DESCRIPTION - ORIENTATION: ORIENTATION: UPPER

## 2025-03-25 ASSESSMENT — PAIN - FUNCTIONAL ASSESSMENT: PAIN_FUNCTIONAL_ASSESSMENT: 0-10

## 2025-03-25 ASSESSMENT — PAIN DESCRIPTION - DESCRIPTORS: DESCRIPTORS: DULL;STABBING

## 2025-03-25 ASSESSMENT — PAIN DESCRIPTION - PAIN TYPE: TYPE: ACUTE PAIN

## 2025-03-25 NOTE — ED PROVIDER NOTES
HPI   Chief Complaint   Patient presents with    Diarrhea     I have worsening symptoms of my diarrhea and was diagnosed with influenza I have green watery diarrhea epigastric pain is dull with stabbing        HPI  Patient is a 74-year-old female presenting to ER for evaluation of vomiting and diarrhea that started on Friday.  Patient states she believes she has food poisoning after eating fried shrimp on Friday.  She states she has had projectile vomiting and green diarrhea and she feels very weak and dehydrated.  She states that today she felt like she was going to pass out thus she presented to ER.  States she does have diffuse aching cramping pain in her abdomen.  Denies fevers or chills.  She denies any chest pain shortness of breath.  She was seen in the ER on Sunday and had received fluids and a CT which showed loose stool in the colon but no acute process.  She states she was released with ciprofloxacin for treatment of urinary tract infection.      Patient History   Past Medical History:   Diagnosis Date    Abdominal pain 06/27/2022    Abnormal urinalysis 11/24/2020    Accidental fall 06/11/2024    Acute urinary tract infection 03/30/2021    Allergic     Anxiety     Arthritis     Cervical vertebral fusion 10/15/2020    [id number] cervical decompression and fusion with allograft spacer    Chest wall pain 06/11/2024    Cholelithiasis and cholecystitis without obstruction 09/04/2023    Contact with and (suspected) exposure to covid-19 04/28/2022    Contusion of chest 06/11/2024    Diabetes mellitus (Multi) Unknown    Dysuria 05/07/2018    Gall stone 11/27/2019    GERD (gastroesophageal reflux disease)     Hypertension Unknown    Injury of head 06/11/2024    Nausea 06/27/2022    Pain of foot 07/06/2020    Urinary tract infection      Past Surgical History:   Procedure Laterality Date    CHOLECYSTECTOMY      HYSTERECTOMY  Sept 1979    TONSILLECTOMY       Family History   Problem Relation Name Age of Onset     Other (Shy-Drager) Mother Dorinda Urban     Tuberculosis Mother Dorinda Urban     Heart disease Father Enrique Urban (Father)     Hypertension Father Enrique Urban (Father)     Atrial fibrillation Father Enrique Urban (Father)     Abnormal EKG Father Enrique Urban (Father)     Angina Father Enrique Sierra (Father)     Diabetes type II Father Enrique Sierra (Father)     Heart attack Father Enrique Sierra (Father)     Heart murmur Father Enrique Sierra (Father)     Cancer Other Uncle      Social History     Tobacco Use    Smoking status: Never    Smokeless tobacco: Never   Vaping Use    Vaping status: Never Used   Substance Use Topics    Alcohol use: Never    Drug use: Never       Physical Exam   ED Triage Vitals [03/25/25 1442]   Temperature Heart Rate Respirations BP   36.5 °C (97.7 °F) 59 18 109/87      Pulse Ox Temp Source Heart Rate Source Patient Position   100 % Oral Monitor Sitting      BP Location FiO2 (%)     Left arm --       Physical Exam  Vitals and nursing note reviewed.   Constitutional:       General: She is not in acute distress.     Appearance: She is well-developed.      Comments: Well-appearing and pleasant 74-year-old female resting in wheelchair   HENT:      Head: Normocephalic and atraumatic.   Eyes:      Conjunctiva/sclera: Conjunctivae normal.   Cardiovascular:      Rate and Rhythm: Normal rate and regular rhythm.      Heart sounds: No murmur heard.  Pulmonary:      Effort: Pulmonary effort is normal. No respiratory distress.      Breath sounds: Normal breath sounds.   Abdominal:      Palpations: Abdomen is soft.      Comments: Diffuse tenderness to palpation nondistended   Musculoskeletal:         General: No swelling.      Cervical back: Neck supple.   Skin:     General: Skin is warm and dry.      Capillary Refill: Capillary refill takes less than 2 seconds.   Neurological:      Mental Status: She is alert.   Psychiatric:         Mood and Affect: Mood normal.           ED Course & MDM   ED Course as of 03/25/25 1922   Tue  Mar 25, 2025   1646 I independently interpreted the results of the EKG and it showed sinus rhythm at 76 bpm, normal axis, normal voltage, normal ST segment, and a slight diffuse flattening of the T waves.  I was not otherwise involved in the care of this patient. [NG]      ED Course User Index  [NG] Keren Edmond MD         Diagnoses as of 03/25/25 1922   Vomiting and diarrhea                 No data recorded     [place] Coma Scale Score: 15 (03/25/25 1503 : Nelia Resendiz RN)                           Medical Decision Making  Parts of this chart have been completed using voice recognition software. Please excuse any errors of transcription.  My thought process and reason for plan has been formulated from the time that I saw the patient until the time of disposition and is not specific to one specific moment during their visit and furthermore my MDM encompasses this entire chart and not only this text box.      HPI: Detailed above.    Exam: A medically appropriate exam performed, outlined above, given the known history and presentation.    History obtained from: Patient    Social Determinants of Health considered during this visit: Repeat visit within the last week    Medications given during visit:  Medications   lactated Ringer's bolus 500 mL (500 mL intravenous New Bag 3/25/25 1753)   dicyclomine (Bentyl) injection 20 mg (has no administration in time range)   prochlorperazine (Compazine) injection 5 mg (has no administration in time range)   sodium chloride 0.9 % bolus 1,000 mL (0 mL intravenous Stopped 3/25/25 1637)   ondansetron (Zofran) injection 4 mg (4 mg intravenous Given 3/25/25 1536)   iohexol (OMNIPaque) 350 mg iodine/mL solution 75 mL (75 mL intravenous Given 3/25/25 1623)   potassium chloride CR (Klor-Con M20) ER tablet 40 mEq (40 mEq oral Given 3/25/25 1753)        Diagnostic/tests  Labs Reviewed   COMPREHENSIVE METABOLIC PANEL - Abnormal       Result Value    Glucose 158 (*)     Sodium 138       Potassium 3.2 (*)     Chloride 109 (*)     Bicarbonate 17 (*)     Anion Gap 15      Urea Nitrogen 12      Creatinine 0.95      eGFR 62      Calcium 9.3      Albumin 4.1      Alkaline Phosphatase 75      Total Protein 6.5      AST 23      Bilirubin, Total 0.5      ALT 24     SARS-COV-2 AND INFLUENZA A/B PCR - Normal    Flu A Result Not Detected      Flu B Result Not Detected      Coronavirus 2019, PCR Not Detected      Narrative:     This assay is an FDA-cleared, in vitro diagnostic nucleic acid amplification test for the qualitative detection and differentiation of SARS CoV-2/ Influenza A/B from nasopharyngeal specimens collected from individuals with signs and symptoms of respiratory tract infections, and has been validated for use at [facility]. Negative results do not preclude COVID-19/ Influenza A/B infections and should not be used as the sole basis for diagnosis, treatment, or other management decisions. Testing for SARS CoV-2 is recommended only for patients who meet current clinical and/or epidemiological criteria defined by federal, state, or local public health directives.   B-TYPE NATRIURETIC PEPTIDE - Normal    BNP 21      Narrative:        <100 pg/mL - Heart failure unlikely  100-299 pg/mL - Intermediate probability of acute heart                  failure exacerbation. Correlate with clinical                  context and patient history.    >=300 pg/mL - Heart Failure likely. Correlate with clinical                  context and patient history.    BNP testing is performed using different testing methodology at [facility] than at other [facility] hospitals. Direct result comparisons should only be made within the same method.      MAGNESIUM - Normal    Magnesium 1.75     SERIAL TROPONIN-INITIAL - Normal    Troponin I, High Sensitivity 4      Narrative:     Less than 99th percentile of normal range cutoff-  Female and children under 18 years old <14 ng/L; Male <21 ng/L:  Negative  Repeat testing should be performed if clinically indicated.     Female and children under 18 years old 14-50 ng/L; Male 21-50 ng/L:  Consistent with possible cardiac damage and possible increased clinical   risk. Serial measurements may help to assess extent of myocardial damage.     >50 ng/L: Consistent with cardiac damage, increased clinical risk and  myocardial infarction. Serial measurements may help assess extent of   myocardial damage.      NOTE: Children less than 1 year old may have higher baseline troponin   levels and results should be interpreted in conjunction with the overall   clinical context.     NOTE: Troponin I testing is performed using a different   testing methodology at [facility] than at other   [facility]. Direct result comparisons should only   be made within the same method.   SERIAL TROPONIN, 1 HOUR - Normal    Troponin I, High Sensitivity 4      Narrative:     Less than 99th percentile of normal range cutoff-  Female and children under 18 years old <14 ng/L; Male <21 ng/L: Negative  Repeat testing should be performed if clinically indicated.     Female and children under 18 years old 14-50 ng/L; Male 21-50 ng/L:  Consistent with possible cardiac damage and possible increased clinical   risk. Serial measurements may help to assess extent of myocardial damage.     >50 ng/L: Consistent with cardiac damage, increased clinical risk and  myocardial infarction. Serial measurements may help assess extent of   myocardial damage.      NOTE: Children less than 1 year old may have higher baseline troponin   levels and results should be interpreted in conjunction with the overall   clinical context.     NOTE: Troponin I testing is performed using a different   testing methodology at [facility] than at other   [facility]. Direct result comparisons should only   be made within the same method.   STOOL PATHOGEN PANEL, PCR   C. DIFFICILE, PCR   CBC WITH  AUTO DIFFERENTIAL    WBC 6.4      nRBC 0.0      RBC 4.71      Hemoglobin 14.9      Hematocrit 42.7      MCV 91      MCH 31.6      MCHC 34.9      RDW 13.2      Platelets 174      Neutrophils % 76.2      Immature Granulocytes %, Automated 0.3      Lymphocytes % 16.6      Monocytes % 5.6      Eosinophils % 1.1      Basophils % 0.2      Neutrophils Absolute 4.91      Immature Granulocytes Absolute, Automated 0.02      Lymphocytes Absolute 1.07      Monocytes Absolute 0.36      Eosinophils Absolute 0.07      Basophils Absolute 0.01     TROPONIN SERIES- (INITIAL, 1 HR)    Narrative:     The following orders were created for panel order Troponin I Series, High Sensitivity (0, 1 HR).  Procedure                               Abnormality         Status                     ---------                               -----------         ------                     Troponin I, High Sensiti...[[id number]]  Normal              Final result               Troponin, High Sensitivi...[[id number]]  Normal              Final result                 Please view results for these tests on the individual orders.      CT abdomen pelvis w IV contrast   Final Result   1. No CT evidence for acute pathology within the abdomen or pelvis.        2. Fluid throughout the colon consistent with diarrhea, however no   submucosal edema nor evidence to suggest colitis.        3.  Diverticulosis of the colon more prominent in the sigmoid region   however no evidence for acute diverticulitis.        Signed by: King Laguerre 3/25/2025 5:36 PM   Dictation workstation:   [id number]           Considerations/further MDM:  Patient is a 74-year-old female presenting for evaluation of vomiting, diarrhea    I saw this patient in conjunction with Dr. rueda    Patient well-appearing in no apparent distress during the visit.  Abdomen with mild diffuse tenderness but nondistended.  No peritonitic signs.  Vital signs are within normal limits.  Patient is provided 1.5 L  fluids, Zofran, Bentyl and Compazine with improvement of symptoms.  Laboratory workup is with mild hypokalemia which is supplemented orally during the ER visit.  CT abdomen pelvis reveals fluid through the colon consistent with diarrhea but no evidence of colitis, cholecystitis, appendicitis, diverticulitis, bowel obstruction or perforation.  Stool pathogen panel was obtained patient is advised to follow-up on results for further therapy.  She has significant improvement in her symptoms and is felt to be stable for outpatient therapy.  Rx for Reglan and Bentyl provided.  Patient advised to continue with Imodium for diarrhea.  Strict return precautions were discussed including worsening of symptoms, inability to tolerate oral intake or severe dehydration.  I discussed the laboratory and imaging findings with the patient at bedside. Patient's questions and concerns were addressed. Patient was released in good condition, discharged with instructions to follow up with primary care provider and appropriate specialist, and to return to ED at any time for worsening symptoms or any other concerns. Patient demonstrates understanding of the findings and the importance of appropriate follow up care.             Procedure  Procedures     Henrietta Mccormack PA-C  03/25/25 1922

## 2025-03-25 NOTE — DISCHARGE INSTRUCTIONS
Take Bentyl as needed for abdominal cramping continue with Zofran or try Reglan for vomiting and use your Imodium for diarrhea.  Present back to ER if you have any new onset or worsening of symptoms.    It is important to remember that your care does not end here and you must continue to monitor your condition closely. Please return to the emergency department for any worsening or concerning signs or symptoms as directed by our conversations and the discharge instructions. If you do not have a doctor please contact the referral number on your discharge instructions. Please contact any physician specialists provided in your discharge notes as it is very important to follow up with them regarding your condition. If you are unable to reach the physicians provided, please come back to the Emergency Department at any time.

## 2025-03-26 ENCOUNTER — APPOINTMENT (OUTPATIENT)
Dept: PRIMARY CARE | Facility: CLINIC | Age: 75
End: 2025-03-26
Payer: MEDICARE

## 2025-03-26 LAB
ATRIAL RATE: 76 BPM
C COLI+JEJ+UPSA DNA STL QL NAA+PROBE: NOT DETECTED
EC STX1 GENE STL QL NAA+PROBE: NOT DETECTED
EC STX2 GENE STL QL NAA+PROBE: NOT DETECTED
NOROVIRUS GI + GII RNA STL NAA+PROBE: NOT DETECTED
P AXIS: -6 DEGREES
P OFFSET: 181 MS
P ONSET: 133 MS
PR INTERVAL: 160 MS
Q ONSET: 213 MS
QRS COUNT: 13 BEATS
QRS DURATION: 84 MS
QT INTERVAL: 404 MS
QTC CALCULATION(BAZETT): 454 MS
QTC FREDERICIA: 436 MS
R AXIS: -20 DEGREES
RV RNA STL NAA+PROBE: NOT DETECTED
SALMONELLA DNA STL QL NAA+PROBE: NOT DETECTED
SHIGELLA DNA SPEC QL NAA+PROBE: NOT DETECTED
T AXIS: 6 DEGREES
T OFFSET: 415 MS
V CHOLERAE DNA STL QL NAA+PROBE: NOT DETECTED
VENTRICULAR RATE: 76 BPM
Y ENTEROCOL DNA STL QL NAA+PROBE: NOT DETECTED

## 2025-03-31 ENCOUNTER — APPOINTMENT (OUTPATIENT)
Dept: PHYSICAL THERAPY | Facility: CLINIC | Age: 75
End: 2025-03-31
Payer: MEDICARE

## 2025-04-02 ENCOUNTER — PHARMACY VISIT (OUTPATIENT)
Dept: PHARMACY | Facility: CLINIC | Age: 75
End: 2025-04-02
Payer: COMMERCIAL

## 2025-04-02 ENCOUNTER — APPOINTMENT (OUTPATIENT)
Dept: PRIMARY CARE | Facility: CLINIC | Age: 75
End: 2025-04-02
Payer: MEDICARE

## 2025-04-02 DIAGNOSIS — E11.9 TYPE 2 DIABETES MELLITUS WITHOUT COMPLICATION, WITHOUT LONG-TERM CURRENT USE OF INSULIN: Primary | ICD-10-CM

## 2025-04-02 PROCEDURE — RXMED WILLOW AMBULATORY MEDICATION CHARGE

## 2025-04-02 RX ORDER — TIRZEPATIDE 2.5 MG/.5ML
2.5 INJECTION, SOLUTION SUBCUTANEOUS
Qty: 2 ML | Refills: 0 | Status: SHIPPED | OUTPATIENT
Start: 2025-04-02 | End: 2025-04-30

## 2025-04-02 NOTE — PROGRESS NOTES
DM FOLLOW UP  E11.9 - Type 2 diabetes    Beryl Sierra is a 74 y.o. female here today at the request of Referring Provider: Rox Govea MD for my opinion regarding diabetes management.  My final recommendations will be communicated back to the requesting provider by way of shared medical record.     Pt here today for diabetes management.  She is the wife of Jorge Morin.  She has many allergies and is not currently taking any medications for diabetes.      Patient does not qualify for VAF    Subjective   Past Medical History:  She has a past medical history of Abdominal pain (06/27/2022), Abnormal urinalysis (11/24/2020), Accidental fall (06/11/2024), Acute urinary tract infection (03/30/2021), Allergic, Anxiety, Arthritis, Cervical vertebral fusion (10/15/2020), Chest wall pain (06/11/2024), Cholelithiasis and cholecystitis without obstruction (09/04/2023), Contact with and (suspected) exposure to covid-19 (04/28/2022), Contusion of chest (06/11/2024), Diabetes mellitus (Multi) (Unknown), Dysuria (05/07/2018), Gall stone (11/27/2019), GERD (gastroesophageal reflux disease), Hypertension (Unknown), Injury of head (06/11/2024), Nausea (06/27/2022), Pain of foot (07/06/2020), and Urinary tract infection.    She has no past medical history of Personal history of irradiation.    Social History:  She reports that she has never smoked. She has never used smokeless tobacco. She reports that she does not drink alcohol and does not use drugs.    Allergies:  Penicillin; Vitamins a,c,e-zinc-copper; Aluminum aspirin; Amlodipine; Aspirin; Atenolol; Cefaclor; Cefuroxime axetil; Cephalexin; Codeine; Covid-19 vaccine, mrna, [id number], lnp-s (pfizer); Doxazosin; Doxycycline; Erythromycin; Escitalopram oxalate; Ezetimibe; Latex; Levofloxacin; Lisinopril; Metformin; Metoprolol; Metronidazole; Qttuv-hyqtk-jdahrrh-pramoxine; Nitrofurantoin monohyd/m-cryst; Other; Simvastatin; Streptomycin; Sulfamethoxazole-trimethoprim;  Tetracycline; Trimethoprim; Tropicamide; Vancomycin; Zolpidem tartrate; and Hydrochlorothiazide    CURRENT PHARMACOTHERAPY   Current Outpatient Medications   Medication Instructions    acetaminophen (TYLENOL ARTHRITIS PAIN) 1,300 mg, 2 times daily    dicyclomine (BENTYL) 20 mg, oral, 2 times daily    meclizine (ANTIVERT) 2.5 mg, 3 times daily PRN    metoclopramide (REGLAN) 10 mg, oral, Every 6 hours    naproxen sodium (Aleve) 220 mg tablet 1 tablet with food or milk as needed Orally every 12 hrs    omeprazole OTC (PRILOSEC OTC) 20 mg, Daily before breakfast     Pt denies SE/intolerances  Reviewed all medications by prescribing practitioner (such as prescriptions, OTCs, herbal therapies, supplements) and documented in the medical record.     Reviewed need for secondary prevention: Statins, ACE-I/ARB, Aspirin    Glucose Monitoring  Patient  is not checking glucose at home.  Started Melecio 3+ sensor in office today.   Patient received the following instructions for Melecio personal start:  Sensor application and start up of sensor; aware to change in 14 days.  Products for better adhesion; skin tac and simpatch.  Expected differences in sensor glucose and blood glucose; always check with meter if symptoms do not match sensor glucose or if magnify glass appears on display.  Explanation/importance of trend arrows.  Steedman functions: target ranges set to ; alerts set if applicable.  Assisted with creation of [employer] account and connecting to practice.   If applicable, bring reader to all office visits.  Remove for MRI, CAT scan and X-ray.  Call Morin at for problems with sensor, they will replace.  Patient correctly applied sensor to back of upper arm and sensor session started.  Handouts: Melecio 3 Getting Started booklet, sample overbandage, sample Skin Tac       Lifestyle:  Current diet: in general, follows a healthy diet  Current exercise: walking    Last Labs/Vitals/Meds  HEMOGLOBIN A1c (% of total Hgb)   Date  Value   03/18/2025 8.6 (H)     Hemoglobin A1C (%)   Date Value   06/03/2024 8.0 (H)   02/07/2024 7.8 (H)   10/03/2023 7.1 (H)   02/17/2023 6.9 (H)   08/16/2022 6.5 (H)   01/24/2022 7.1 (H)   07/30/2021 7.1 (H)   05/21/2021 7.0 (H)   11/21/2020 7.0 (H)     GLUCOSE (mg/dL)   Date Value   03/18/2025 171 (H)     Glucose (mg/dL)   Date Value   03/25/2025 158 (H)     Creatinine (mg/dL)   Date Value   03/25/2025 0.95     CREATININE (mg/dL)   Date Value   03/18/2025 0.81     EGFR (mL/min/1.73m2)   Date Value   03/18/2025 75     eGFR (mL/min/1.73m*2)   Date Value   03/25/2025 62       BP Readings from Last 3 Encounters:   03/25/25 109/87   03/23/25 124/73   03/20/25 156/82        Wt Readings from Last 6 Encounters:   03/25/25 77.1 kg (170 lb)   03/23/25 78.9 kg (173 lb 15.1 oz)   03/20/25 79.4 kg (175 lb)   03/18/25 81.2 kg (179 lb)   02/03/25 81.2 kg (179 lb)   01/17/25 79.8 kg (176 lb)     BMI Readings from Last 6 Encounters:   03/25/25 31.09 kg/m²   03/23/25 31.81 kg/m²   03/20/25 32.01 kg/m²   03/18/25 32.74 kg/m²   02/03/25 32.74 kg/m²   01/17/25 32.19 kg/m²       Assessment:  Patients diabetes is poorly controlled with most recent A1c of 8.6% (Goal < 7%).  Compliance at present is estimated to be good.  Pt has many allergies/intolerances.  Discussed start medication for T2DM.  Best choice being GLP-1 or bolus insulin.  Discussed benefits of each medication as well at MOA, side effect profiles.  Pt would like to start GLP-1.  If SE occur then we will discontinue and start basal insulin.    Started CGM in office which will assist in monitoring sugars and provide awareness to patient.  4.    Mounjaro Education provided.  Counseled patient on Mounjaro MOA, expectations, side effects, duration of therapy, administration, and monitoring parameters.  Provided detailed dosing and administration counseling to ensure proper technique.   Reviewed Mounjaro titration schedule, starting with 2.5 mg once weekly to a goal of 15 mg  once weekly if tolerated  Counseled patient on the benefits of GLP-1ra glycemic control and weight loss  Reviewed storage requirements of Mounjaro when not in use, and when to administer the medication if a dose is missed.  Advised patient that they may experience improved satiety after meals and portion sizes of meals may be reduced as doses of Mounjaro increase.        Plan:  1.  START Mounjaro 2.5mg once weekly   2.  Check ben often - when you wake up, before/after meals, bedtime, etc. Follow CGM closely, learning from glucose readings which meals/snacks cause higher blood sugars. Call [employer]doodle[employer] at [phone] for any problems with sensor readings or adhesion.  3.  Follow up 2 weeks       Data reviewed and evaluated by SHERRELL Vásquez RPH, CDCES  Treatment and plan changes discussed with Rox Govea MD
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
## 2025-04-07 ENCOUNTER — TREATMENT (OUTPATIENT)
Dept: PHYSICAL THERAPY | Facility: CLINIC | Age: 75
End: 2025-04-07
Payer: MEDICARE
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
## 2025-04-07 DIAGNOSIS — H53.149 PHOTOPHOBIA: ICD-10-CM

## 2025-04-07 DIAGNOSIS — H53.9 VISUAL DISORDER: ICD-10-CM

## 2025-04-07 DIAGNOSIS — R26.89 BALANCE DISORDER: ICD-10-CM

## 2025-04-07 DIAGNOSIS — S06.0X0S CONCUSSION WITHOUT LOSS OF CONSCIOUSNESS, SEQUELA: ICD-10-CM

## 2025-04-07 DIAGNOSIS — R42 DIZZINESS: ICD-10-CM

## 2025-04-07 DIAGNOSIS — M54.2 CERVICAL PAIN: ICD-10-CM

## 2025-04-07 DIAGNOSIS — F40.298 PHONOPHOBIA: ICD-10-CM

## 2025-04-07 DIAGNOSIS — S16.1XXD CERVICAL STRAIN, SUBSEQUENT ENCOUNTER: ICD-10-CM

## 2025-04-07 DIAGNOSIS — M54.2 CERVICALGIA: Primary | ICD-10-CM

## 2025-04-07 DIAGNOSIS — M25.512 LEFT SHOULDER PAIN, UNSPECIFIED CHRONICITY: ICD-10-CM

## 2025-04-07 DIAGNOSIS — G44.319 ACUTE POST-TRAUMATIC HEADACHE, NOT INTRACTABLE: ICD-10-CM

## 2025-04-07 PROCEDURE — 97014 ELECTRIC STIMULATION THERAPY: CPT | Mod: GP

## 2025-04-07 PROCEDURE — 97110 THERAPEUTIC EXERCISES: CPT | Mod: GP

## 2025-04-07 PROCEDURE — 97035 APP MDLTY 1+ULTRASOUND EA 15: CPT | Mod: GP

## 2025-04-07 ASSESSMENT — PAIN - FUNCTIONAL ASSESSMENT: PAIN_FUNCTIONAL_ASSESSMENT: 0-10

## 2025-04-07 ASSESSMENT — PAIN SCALES - GENERAL: PAINLEVEL_OUTOF10: 4

## 2025-04-08 ENCOUNTER — TELEPHONE (OUTPATIENT)
Dept: PRIMARY CARE | Facility: CLINIC | Age: 75
End: 2025-04-08
Payer: MEDICARE

## 2025-04-08 DIAGNOSIS — E11.9 TYPE 2 DIABETES MELLITUS WITHOUT COMPLICATION, WITHOUT LONG-TERM CURRENT USE OF INSULIN: Primary | ICD-10-CM

## 2025-04-08 RX ORDER — BLOOD-GLUCOSE SENSOR
EACH MISCELLANEOUS
Qty: 2 EACH | Refills: 11 | Status: SHIPPED | OUTPATIENT
Start: 2025-04-08

## 2025-04-08 NOTE — TELEPHONE ENCOUNTER
Patient stopped in to get another maddi 3 sensor.  She has an appointment on 4-21-25 but will need by 4-18.  Please advise.

## 2025-04-10 ENCOUNTER — TELEPHONE (OUTPATIENT)
Dept: PRIMARY CARE | Facility: CLINIC | Age: 75
End: 2025-04-10
Payer: MEDICARE

## 2025-04-10 NOTE — TELEPHONE ENCOUNTER
Patient came in and dropped off a letter to instruct us on how to get her mounjaro for weight loss, this will be placed in [facility]'s mailbox, this is to verify that she is not taking it to lose weight.  But, patient was given a month supply of  mounjaro from [facility], she has taken it twice and after each time she experienced burning when urinating, this lasted 2 days last time, but this time she hasn't noticed it much and she has been using the vagisil wipes, she has an appointment with Stella on 4/21, but she wanted to voice the concerns with this as this was not a side effect mentioned by any pharmacist she's talked to.    She takes it Wednesday evening around 7 pm.    Please Advise: [phone]

## 2025-04-14 ENCOUNTER — TREATMENT (OUTPATIENT)
Dept: PHYSICAL THERAPY | Facility: CLINIC | Age: 75
End: 2025-04-14
Payer: MEDICARE

## 2025-04-14 ENCOUNTER — TELEPHONE (OUTPATIENT)
Dept: PRIMARY CARE | Facility: CLINIC | Age: 75
End: 2025-04-14
Payer: MEDICARE

## 2025-04-14 DIAGNOSIS — S06.0X0S CONCUSSION WITHOUT LOSS OF CONSCIOUSNESS, SEQUELA: ICD-10-CM

## 2025-04-14 DIAGNOSIS — R42 DIZZINESS: ICD-10-CM

## 2025-04-14 DIAGNOSIS — H53.149 PHOTOPHOBIA: ICD-10-CM

## 2025-04-14 DIAGNOSIS — M54.2 CERVICAL PAIN: ICD-10-CM
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
## 2025-04-14 DIAGNOSIS — R26.89 BALANCE DISORDER: ICD-10-CM

## 2025-04-14 DIAGNOSIS — M25.512 LEFT SHOULDER PAIN, UNSPECIFIED CHRONICITY: ICD-10-CM

## 2025-04-14 DIAGNOSIS — M54.2 CERVICALGIA: Primary | ICD-10-CM

## 2025-04-14 DIAGNOSIS — G44.319 ACUTE POST-TRAUMATIC HEADACHE, NOT INTRACTABLE: ICD-10-CM

## 2025-04-14 DIAGNOSIS — H53.9 VISUAL DISORDER: ICD-10-CM

## 2025-04-14 DIAGNOSIS — S16.1XXD CERVICAL STRAIN, SUBSEQUENT ENCOUNTER: ICD-10-CM

## 2025-04-14 DIAGNOSIS — F40.298 PHONOPHOBIA: ICD-10-CM

## 2025-04-14 PROCEDURE — 97035 APP MDLTY 1+ULTRASOUND EA 15: CPT | Mod: GP

## 2025-04-14 PROCEDURE — 97110 THERAPEUTIC EXERCISES: CPT | Mod: GP

## 2025-04-14 PROCEDURE — 97014 ELECTRIC STIMULATION THERAPY: CPT | Mod: GP

## 2025-04-14 ASSESSMENT — PAIN SCALES - GENERAL: PAINLEVEL_OUTOF10: 5 - MODERATE PAIN

## 2025-04-14 ASSESSMENT — PAIN - FUNCTIONAL ASSESSMENT: PAIN_FUNCTIONAL_ASSESSMENT: 0-10
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
## 2025-04-14 NOTE — TELEPHONE ENCOUNTER
Pt asked to have us canc  her rx for mounjaro , she is having  side affects and the pharmacist told her not to take it. She will discuss at next appt with [facility]

## 2025-04-16 ENCOUNTER — APPOINTMENT (OUTPATIENT)
Dept: SPORTS MEDICINE | Facility: CLINIC | Age: 75
End: 2025-04-16
Payer: MEDICARE

## 2025-04-18 LAB — NONINV COLON CA DNA+OCC BLD SCRN STL QL: NEGATIVE

## 2025-04-21 ENCOUNTER — APPOINTMENT (OUTPATIENT)
Dept: PRIMARY CARE | Facility: CLINIC | Age: 75
End: 2025-04-21
Payer: MEDICARE

## 2025-04-21 ENCOUNTER — TREATMENT (OUTPATIENT)
Dept: PHYSICAL THERAPY | Facility: CLINIC | Age: 75
End: 2025-04-21
Payer: MEDICARE

## 2025-04-21 DIAGNOSIS — E11.9 TYPE 2 DIABETES MELLITUS WITHOUT COMPLICATION, WITHOUT LONG-TERM CURRENT USE OF INSULIN: ICD-10-CM

## 2025-04-21 DIAGNOSIS — S16.1XXD CERVICAL STRAIN, SUBSEQUENT ENCOUNTER: ICD-10-CM

## 2025-04-21 DIAGNOSIS — R26.89 BALANCE DISORDER: ICD-10-CM

## 2025-04-21 DIAGNOSIS — H53.9 VISUAL DISORDER: ICD-10-CM
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
## 2025-04-21 DIAGNOSIS — S06.0X0S CONCUSSION WITHOUT LOSS OF CONSCIOUSNESS, SEQUELA: ICD-10-CM

## 2025-04-21 DIAGNOSIS — M54.2 CERVICALGIA: Primary | ICD-10-CM

## 2025-04-21 DIAGNOSIS — M25.512 LEFT SHOULDER PAIN, UNSPECIFIED CHRONICITY: ICD-10-CM

## 2025-04-21 DIAGNOSIS — H53.149 PHOTOPHOBIA: ICD-10-CM

## 2025-04-21 DIAGNOSIS — M54.2 CERVICAL PAIN: ICD-10-CM

## 2025-04-21 DIAGNOSIS — F40.298 PHONOPHOBIA: ICD-10-CM

## 2025-04-21 DIAGNOSIS — R42 DIZZINESS: ICD-10-CM

## 2025-04-21 DIAGNOSIS — G44.319 ACUTE POST-TRAUMATIC HEADACHE, NOT INTRACTABLE: ICD-10-CM

## 2025-04-21 PROCEDURE — 97110 THERAPEUTIC EXERCISES: CPT | Mod: GP

## 2025-04-21 PROCEDURE — 97035 APP MDLTY 1+ULTRASOUND EA 15: CPT | Mod: GP

## 2025-04-21 PROCEDURE — 97014 ELECTRIC STIMULATION THERAPY: CPT | Mod: GP

## 2025-04-21 ASSESSMENT — PAIN - FUNCTIONAL ASSESSMENT: PAIN_FUNCTIONAL_ASSESSMENT: 0-10

## 2025-04-21 ASSESSMENT — PAIN SCALES - GENERAL: PAINLEVEL_OUTOF10: 6
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
## 2025-04-21 NOTE — PROGRESS NOTES
DM FOLLOW UP  E11.9 - Type 2 diabetes    Beryl Sierra is a 74 y.o. female here today at the request of Referring Provider: Rox Govea MD for my opinion regarding diabetes management.  My final recommendations will be communicated back to the requesting provider by way of shared medical record.     Pt here today for follow up re: T2DM.  Started Mounjaro 2.5mg after last OV.  Reports facial rash and bumps all over her forehead after giving the first dose.      Patient does not qualify for VAF    Subjective   Past Medical History:  She has a past medical history of Abdominal pain (06/27/2022), Abnormal urinalysis (11/24/2020), Accidental fall (06/11/2024), Acute urinary tract infection (03/30/2021), Allergic, Anxiety, Arthritis, Cervical vertebral fusion (10/15/2020), Chest wall pain (06/11/2024), Cholelithiasis and cholecystitis without obstruction (09/04/2023), Contact with and (suspected) exposure to covid-19 (04/28/2022), Contusion of chest (06/11/2024), Diabetes mellitus (Multi) (Unknown), Dysuria (05/07/2018), Gall stone (11/27/2019), GERD (gastroesophageal reflux disease), Hypertension (Unknown), Injury of head (06/11/2024), Nausea (06/27/2022), Pain of foot (07/06/2020), and Urinary tract infection.    She has no past medical history of Personal history of irradiation.    Social History:  She reports that she has never smoked. She has never used smokeless tobacco. She reports that she does not drink alcohol and does not use drugs.    Allergies:  Penicillin; Vitamins a,c,e-zinc-copper; Aluminum aspirin; Amlodipine; Aspirin; Atenolol; Cefaclor; Cefuroxime axetil; Cephalexin; Codeine; Covid-19 vaccine, mrna, [id number], lnp-s (pfizer); Doxazosin; Doxycycline; Erythromycin; Escitalopram oxalate; Ezetimibe; Latex; Levofloxacin; Lisinopril; Metformin; Metoprolol; Metronidazole; Uvkqh-sefuz-dlqzrot-pramoxine; Nitrofurantoin monohyd/m-cryst; Other; Simvastatin; Streptomycin; Sulfamethoxazole-trimethoprim;  Tetracycline; Trimethoprim; Tropicamide; Vancomycin; Zolpidem tartrate; and Hydrochlorothiazide    CURRENT PHARMACOTHERAPY   Current Outpatient Medications   Medication Instructions    acetaminophen (TYLENOL ARTHRITIS PAIN) 1,300 mg, 2 times daily    blood-glucose sensor (FreeStyle Melecio 3 Plus Sensor) device Change sensor every 15 days as directed    meclizine (ANTIVERT) 2.5 mg, 3 times daily PRN    metoclopramide (REGLAN) 10 mg, oral, Every 6 hours    naproxen sodium (Aleve) 220 mg tablet 1 tablet with food or milk as needed Orally every 12 hrs    omeprazole OTC (PRILOSEC OTC) 20 mg, Daily before breakfast     Pt reports vaginal itching/burning and facial acne breakouts.  Stopped medication after 2 doses.        Reviewed all medications by prescribing practitioner (such as prescriptions, OTCs, herbal therapies, supplements) and documented in the medical record.     Reviewed need for secondary prevention: Statins, ACE-I/ARB, Aspirin    Primary/Secondary Prevention   - Statin? No pt declines   - ACE-I/ARB? No pt declines   - Aspirin? No    Pertinent PMH Review:  - PMH of Pancreatitis: No  - PMH of Retinopathy: No  - PMH of Urinary Tract Infections: No  - PMH of MTC: No    Glucose Monitoring  Patient is using CGM, Melecio.  Report reviewed with patient.  See attached documents.  Name: Beryl Sierra  YOB: 1950  Report Period: 04/04/2025 - 04/17/2025 (14 days)  Generated: 04/21/2025  Time CGM Active: 96%    Glucose Statistics and Targets  Average Glucose: 130 mg/dL  Glucose Management Indicator (GMI): 6.4%  Glucose Variability (%CV): 17.1%  Target Range: 70 - 180 mg/dL    Time in Ranges  Very High: >250 mg/dL --- 0%  High: 181 - 250 mg/dL --- 3%  Target Range: 70 - 180 mg/dL --- 97%  Low: 54 - 69 mg/dL --- 0%  Very Low: <54 mg/dL --- 0%    Lifestyle:  Current diet: improving, trying to limit CHO foods  Current exercise: no regular exercise    Last Labs/Vitals/Meds  HEMOGLOBIN A1c (% of total Hgb)   Date  Value   03/18/2025 8.6 (H)     Hemoglobin A1C (%)   Date Value   06/03/2024 8.0 (H)   02/07/2024 7.8 (H)   10/03/2023 7.1 (H)   02/17/2023 6.9 (H)   08/16/2022 6.5 (H)   01/24/2022 7.1 (H)   07/30/2021 7.1 (H)   05/21/2021 7.0 (H)   11/21/2020 7.0 (H)     GLUCOSE (mg/dL)   Date Value   03/18/2025 171 (H)     Glucose (mg/dL)   Date Value   03/25/2025 158 (H)     Creatinine (mg/dL)   Date Value   03/25/2025 0.95     CREATININE (mg/dL)   Date Value   03/18/2025 0.81     EGFR (mL/min/1.73m2)   Date Value   03/18/2025 75     eGFR (mL/min/1.73m*2)   Date Value   03/25/2025 62       BP Readings from Last 3 Encounters:   03/25/25 109/87   03/23/25 124/73   03/20/25 156/82        Wt Readings from Last 6 Encounters:   03/25/25 77.1 kg (170 lb)   03/23/25 78.9 kg (173 lb 15.1 oz)   03/20/25 79.4 kg (175 lb)   03/18/25 81.2 kg (179 lb)   02/03/25 81.2 kg (179 lb)   01/17/25 79.8 kg (176 lb)     BMI Readings from Last 6 Encounters:   03/25/25 31.09 kg/m²   03/23/25 31.81 kg/m²   03/20/25 32.01 kg/m²   03/18/25 32.74 kg/m²   02/03/25 32.74 kg/m²   01/17/25 32.19 kg/m²       Assessment:  Patients diabetes is poorly controlled with most recent A1c of 8.6% (Goal < 7%).  Was 8.0%  10 months ago  Compliance at present is estimated to be fair  Discussed discontinue Mounjaro due to side effects.  Reviewed Melecio report with patient.  Melecio was worn during the time that patient was taking Mounjaro.   Glycemic control looks good on current report, however we discussed that this might not be the case when medication is eliminated from bloodstream.  Suggest continue Melecio in order to learn what works and what doesn't.  Can consider low dose basal insulin at next OV if hyperglycemia occurs.  Explained to patient that this is really our only option due to previous intolerances.        Plan:  1.  STOP Mounjaro   2.  Continue all other medications at current dosages  3.  Follow up in 1 month with clinical pharmacist      Data reviewed and  evaluated by SHERRELL Vásquez, [facility]  Treatment and plan changes discussed with Rox Govea MD

## 2025-04-28 ENCOUNTER — APPOINTMENT (OUTPATIENT)
Dept: PHYSICAL THERAPY | Facility: CLINIC | Age: 75
End: 2025-04-28
Payer: MEDICARE

## 2025-04-28 DIAGNOSIS — R26.89 BALANCE DISORDER: ICD-10-CM

## 2025-04-28 DIAGNOSIS — S06.0X0S CONCUSSION WITHOUT LOSS OF CONSCIOUSNESS, SEQUELA: ICD-10-CM

## 2025-04-28 DIAGNOSIS — H53.9 VISUAL DISORDER: ICD-10-CM

## 2025-04-28 DIAGNOSIS — H53.149 PHOTOPHOBIA: ICD-10-CM

## 2025-04-28 DIAGNOSIS — G44.319 ACUTE POST-TRAUMATIC HEADACHE, NOT INTRACTABLE: ICD-10-CM

## 2025-04-28 DIAGNOSIS — M54.2 CERVICALGIA: ICD-10-CM

## 2025-04-28 DIAGNOSIS — S16.1XXD CERVICAL STRAIN, SUBSEQUENT ENCOUNTER: ICD-10-CM
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
## 2025-04-28 DIAGNOSIS — F40.298 PHONOPHOBIA: ICD-10-CM

## 2025-04-28 DIAGNOSIS — M54.2 CERVICAL PAIN: ICD-10-CM

## 2025-04-28 DIAGNOSIS — R42 DIZZINESS: ICD-10-CM

## 2025-04-28 DIAGNOSIS — M25.512 LEFT SHOULDER PAIN, UNSPECIFIED CHRONICITY: Primary | ICD-10-CM

## 2025-04-28 PROCEDURE — 97035 APP MDLTY 1+ULTRASOUND EA 15: CPT | Mod: GP

## 2025-04-28 PROCEDURE — 97014 ELECTRIC STIMULATION THERAPY: CPT | Mod: GP

## 2025-04-28 PROCEDURE — 97110 THERAPEUTIC EXERCISES: CPT | Mod: GP

## 2025-04-28 ASSESSMENT — PAIN - FUNCTIONAL ASSESSMENT: PAIN_FUNCTIONAL_ASSESSMENT: 0-10

## 2025-04-28 ASSESSMENT — PAIN SCALES - GENERAL: PAINLEVEL_OUTOF10: 5 - MODERATE PAIN
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
## 2025-05-05 ENCOUNTER — TREATMENT (OUTPATIENT)
Dept: PHYSICAL THERAPY | Facility: CLINIC | Age: 75
End: 2025-05-05
Payer: MEDICARE

## 2025-05-05 ENCOUNTER — APPOINTMENT (OUTPATIENT)
Dept: ORTHOPEDIC SURGERY | Facility: CLINIC | Age: 75
End: 2025-05-05
Payer: MEDICARE

## 2025-05-05 DIAGNOSIS — S16.1XXD CERVICAL STRAIN, SUBSEQUENT ENCOUNTER: ICD-10-CM

## 2025-05-05 DIAGNOSIS — M54.2 CERVICAL PAIN: ICD-10-CM

## 2025-05-05 DIAGNOSIS — M54.12 CERVICAL RADICULOPATHY: ICD-10-CM

## 2025-05-05 DIAGNOSIS — R26.89 BALANCE DISORDER: ICD-10-CM

## 2025-05-05 DIAGNOSIS — M12.812 ROTATOR CUFF ARTHROPATHY, LEFT: ICD-10-CM

## 2025-05-05 DIAGNOSIS — F40.298 PHONOPHOBIA: ICD-10-CM

## 2025-05-05 DIAGNOSIS — H53.9 VISUAL DISORDER: ICD-10-CM
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
## 2025-05-05 DIAGNOSIS — H53.149 PHOTOPHOBIA: ICD-10-CM

## 2025-05-05 DIAGNOSIS — S06.0X0S CONCUSSION WITHOUT LOSS OF CONSCIOUSNESS, SEQUELA: ICD-10-CM

## 2025-05-05 DIAGNOSIS — G44.319 ACUTE POST-TRAUMATIC HEADACHE, NOT INTRACTABLE: ICD-10-CM

## 2025-05-05 DIAGNOSIS — M25.512 LEFT SHOULDER PAIN, UNSPECIFIED CHRONICITY: ICD-10-CM

## 2025-05-05 DIAGNOSIS — M67.912 TENDINOPATHY OF LEFT ROTATOR CUFF: Primary | ICD-10-CM

## 2025-05-05 DIAGNOSIS — M54.2 CERVICALGIA: Primary | ICD-10-CM

## 2025-05-05 DIAGNOSIS — R42 DIZZINESS: ICD-10-CM

## 2025-05-05 PROCEDURE — 1159F MED LIST DOCD IN RCRD: CPT | Performed by: ORTHOPAEDIC SURGERY

## 2025-05-05 PROCEDURE — 97035 APP MDLTY 1+ULTRASOUND EA 15: CPT | Mod: GP

## 2025-05-05 PROCEDURE — 97110 THERAPEUTIC EXERCISES: CPT | Mod: GP

## 2025-05-05 PROCEDURE — 1160F RVW MEDS BY RX/DR IN RCRD: CPT | Performed by: ORTHOPAEDIC SURGERY

## 2025-05-05 PROCEDURE — 97014 ELECTRIC STIMULATION THERAPY: CPT | Mod: GP

## 2025-05-05 PROCEDURE — 99204 OFFICE O/P NEW MOD 45 MIN: CPT | Performed by: ORTHOPAEDIC SURGERY

## 2025-05-05 PROCEDURE — 1036F TOBACCO NON-USER: CPT | Performed by: ORTHOPAEDIC SURGERY

## 2025-05-05 PROCEDURE — 1123F ACP DISCUSS/DSCN MKR DOCD: CPT | Performed by: ORTHOPAEDIC SURGERY

## 2025-05-05 ASSESSMENT — PAIN SCALES - GENERAL
PAINLEVEL_OUTOF10: 5 - MODERATE PAIN
PAINLEVEL_OUTOF10: 5 - MODERATE PAIN

## 2025-05-05 ASSESSMENT — ENCOUNTER SYMPTOMS
NECK PAIN: 1
FATIGUE: 0
FEVER: 0
CHILLS: 0
ARTHRALGIAS: 1
SHORTNESS OF BREATH: 0
BRUISES/BLEEDS EASILY: 0
WHEEZING: 0

## 2025-05-05 ASSESSMENT — PAIN - FUNCTIONAL ASSESSMENT: PAIN_FUNCTIONAL_ASSESSMENT: 0-10
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
## 2025-05-06 ENCOUNTER — TELEPHONE (OUTPATIENT)
Dept: PRIMARY CARE | Facility: CLINIC | Age: 75
End: 2025-05-06
Payer: MEDICARE

## 2025-05-06 PROBLEM — M54.2 CERVICAL PAIN: Status: ACTIVE | Noted: 2025-05-06

## 2025-05-06 NOTE — TELEPHONE ENCOUNTER
Called patient she says her insurance told her that it can be appealed, because she has so many medication allergies. How do I go about doing the appeal?

## 2025-05-06 NOTE — TELEPHONE ENCOUNTER
Patient spoke to [email or web address] and found out there's a program for CGM for Patients who are not on daily insulin, it's for Dexcom STELO    The [employer] link is direct.[employer]mer     [employer] stated to click on buy now button 1 box contains 2 sensors, 2 boxes is $94, 3 or more boxes contains 6-8 sensors $89 each box    Patient wants Stella to know this is Only for People that do not have daily insulin, no Prescription needed    Patient can be reached at [phone], if there's any questions   social work

## 2025-05-06 NOTE — TELEPHONE ENCOUNTER
Patient stated that her Free Style Melecio 3 was denied by her insurance can you look into this for her please?

## 2025-05-12 ENCOUNTER — APPOINTMENT (OUTPATIENT)
Dept: PHYSICAL THERAPY | Facility: CLINIC | Age: 75
End: 2025-05-12
Payer: MEDICARE

## 2025-05-19 ENCOUNTER — APPOINTMENT (OUTPATIENT)
Dept: PRIMARY CARE | Facility: CLINIC | Age: 75
End: 2025-05-19
Payer: MEDICARE

## 2025-05-19 ENCOUNTER — APPOINTMENT (OUTPATIENT)
Dept: PHYSICAL THERAPY | Facility: CLINIC | Age: 75
End: 2025-05-19
Payer: MEDICARE

## 2025-05-20 DIAGNOSIS — E11.9 TYPE 2 DIABETES MELLITUS WITHOUT COMPLICATION, WITHOUT LONG-TERM CURRENT USE OF INSULIN: ICD-10-CM

## 2025-05-27 ENCOUNTER — APPOINTMENT (OUTPATIENT)
Dept: PHYSICAL THERAPY | Facility: CLINIC | Age: 75
End: 2025-05-27
Payer: MEDICARE

## 2025-05-28 ENCOUNTER — OFFICE VISIT (OUTPATIENT)
Dept: PAIN MEDICINE | Facility: CLINIC | Age: 75
End: 2025-05-28
Payer: MEDICARE

## 2025-05-28 VITALS
WEIGHT: 165 LBS | SYSTOLIC BLOOD PRESSURE: 136 MMHG | OXYGEN SATURATION: 99 % | HEIGHT: 62 IN | HEART RATE: 96 BPM | DIASTOLIC BLOOD PRESSURE: 88 MMHG | RESPIRATION RATE: 22 BRPM | BODY MASS INDEX: 30.36 KG/M2

## 2025-05-28 DIAGNOSIS — M54.12 CERVICAL RADICULOPATHY: Primary | ICD-10-CM

## 2025-05-28 PROCEDURE — 99214 OFFICE O/P EST MOD 30 MIN: CPT | Performed by: ANESTHESIOLOGY

## 2025-05-28 PROCEDURE — 1036F TOBACCO NON-USER: CPT | Performed by: ANESTHESIOLOGY

## 2025-05-28 PROCEDURE — 99204 OFFICE O/P NEW MOD 45 MIN: CPT | Performed by: ANESTHESIOLOGY

## 2025-05-28 PROCEDURE — 1159F MED LIST DOCD IN RCRD: CPT | Performed by: ANESTHESIOLOGY

## 2025-05-28 PROCEDURE — G2211 COMPLEX E/M VISIT ADD ON: HCPCS | Performed by: ANESTHESIOLOGY

## 2025-05-28 PROCEDURE — 1160F RVW MEDS BY RX/DR IN RCRD: CPT | Performed by: ANESTHESIOLOGY

## 2025-05-28 PROCEDURE — 3079F DIAST BP 80-89 MM HG: CPT | Performed by: ANESTHESIOLOGY

## 2025-05-28 PROCEDURE — 3075F SYST BP GE 130 - 139MM HG: CPT | Performed by: ANESTHESIOLOGY

## 2025-05-28 ASSESSMENT — LIFESTYLE VARIABLES
HOW MANY STANDARD DRINKS CONTAINING ALCOHOL DO YOU HAVE ON A TYPICAL DAY: PATIENT DOES NOT DRINK
HOW OFTEN DO YOU HAVE A DRINK CONTAINING ALCOHOL: NEVER
AUDIT-C TOTAL SCORE: 0
SKIP TO QUESTIONS 9-10: 1
HOW OFTEN DO YOU HAVE SIX OR MORE DRINKS ON ONE OCCASION: NEVER

## 2025-05-28 ASSESSMENT — PAIN SCALES - GENERAL
PAINLEVEL_OUTOF10: 5 - MODERATE PAIN
PAINLEVEL_OUTOF10: 5

## 2025-05-28 ASSESSMENT — ENCOUNTER SYMPTOMS
NECK PAIN: 1
ACTIVITY CHANGE: 1
NECK STIFFNESS: 1

## 2025-05-28 ASSESSMENT — PATIENT HEALTH QUESTIONNAIRE - PHQ9
1. LITTLE INTEREST OR PLEASURE IN DOING THINGS: SEVERAL DAYS
2. FEELING DOWN, DEPRESSED OR HOPELESS: SEVERAL DAYS
SUM OF ALL RESPONSES TO PHQ9 QUESTIONS 1 & 2: 2

## 2025-05-28 ASSESSMENT — PAIN - FUNCTIONAL ASSESSMENT: PAIN_FUNCTIONAL_ASSESSMENT: 0-10

## 2025-05-28 ASSESSMENT — PAIN DESCRIPTION - DESCRIPTORS: DESCRIPTORS: SHARP
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
## 2025-06-02 ENCOUNTER — APPOINTMENT (OUTPATIENT)
Dept: PRIMARY CARE | Facility: CLINIC | Age: 75
End: 2025-06-02
Payer: MEDICARE

## 2025-06-02 DIAGNOSIS — Z79.4 TYPE 2 DIABETES MELLITUS WITHOUT COMPLICATION, WITH LONG-TERM CURRENT USE OF INSULIN: Primary | ICD-10-CM

## 2025-06-02 DIAGNOSIS — E11.9 TYPE 2 DIABETES MELLITUS WITHOUT COMPLICATION, WITH LONG-TERM CURRENT USE OF INSULIN: Primary | ICD-10-CM

## 2025-06-02 DIAGNOSIS — E11.9 TYPE 2 DIABETES MELLITUS WITHOUT COMPLICATION, WITHOUT LONG-TERM CURRENT USE OF INSULIN: ICD-10-CM

## 2025-06-02 RX ORDER — BLOOD-GLUCOSE SENSOR
EACH MISCELLANEOUS
Qty: 9 EACH | Refills: 3 | Status: SHIPPED | OUTPATIENT
Start: 2025-06-02

## 2025-06-02 RX ORDER — PEN NEEDLE, DIABETIC 30 GX3/16"
NEEDLE, DISPOSABLE MISCELLANEOUS
Qty: 100 EACH | Refills: 2 | Status: SHIPPED | OUTPATIENT
Start: 2025-06-02

## 2025-06-02 RX ORDER — INSULIN GLARGINE 100 [IU]/ML
INJECTION, SOLUTION SUBCUTANEOUS
Qty: 15 ML | Refills: 11 | Status: SHIPPED | OUTPATIENT
Start: 2025-06-02

## 2025-06-02 NOTE — PROGRESS NOTES
DM FOLLOW UP  E11.9 - Type 2 diabetes    Beryl Sierra is a 75 y.o. female here today at the request of Referring Provider: No ref. provider found for my opinion regarding diabetes management.  My final recommendations will be communicated back to the requesting provider by way of shared medical record.     Patient does not need VAF assistance at this time     Subjective   Past Medical History:  She has a past medical history of Abdominal pain (06/27/2022), Abnormal urinalysis (11/24/2020), Accidental fall (06/11/2024), Acute urinary tract infection (03/30/2021), Allergic, Anxiety, Arthritis, Cervical vertebral fusion (10/15/2020), Chest wall pain (06/11/2024), Cholelithiasis and cholecystitis without obstruction (09/04/2023), Contact with and (suspected) exposure to covid-19 (04/28/2022), Contusion of chest (06/11/2024), Diabetes mellitus (Multi) (Unknown), Dysuria (05/07/2018), Gall stone (11/27/2019), GERD (gastroesophageal reflux disease), Hypertension (Unknown), Injury of head (06/11/2024), Nausea (06/27/2022), Pain of foot (07/06/2020), and Urinary tract infection.    She has no past medical history of Personal history of irradiation.    Social History:  She reports that she has never smoked. She has never used smokeless tobacco. She reports that she does not drink alcohol and does not use drugs.    Allergies:  Penicillin; Vitamins a,c,e-zinc-copper; Aluminum aspirin; Amlodipine; Aspirin; Atenolol; Cefaclor; Cefuroxime axetil; Cephalexin; Codeine; Covid-19 vaccine, mrna, [id number], lnp-s (pfizer); Doxazosin; Doxycycline; Erythromycin; Escitalopram oxalate; Ezetimibe; Latex; Levofloxacin; Lisinopril; Metformin; Metoprolol; Metronidazole; Ekpbl-shshe-ubulatv-pramoxine; Nitrofurantoin monohyd/m-cryst; Other; Simvastatin; Streptomycin; Sulfamethoxazole-trimethoprim; Tetracycline; Trimethoprim; Tropicamide; Vancomycin; Zolpidem tartrate; and Hydrochlorothiazide    CURRENT PHARMACOTHERAPY   Current Outpatient  Medications   Medication Instructions    acetaminophen (TYLENOL ARTHRITIS PAIN) 1,300 mg, 2 times daily    blood-glucose sensor (FreeStyle Melecio 3 Plus Sensor) device Change sensor every 15 days as directed    meclizine (ANTIVERT) 2.5 mg, 3 times daily PRN    naproxen sodium (Aleve) 220 mg tablet 1 tablet with food or milk as needed Orally every 12 hrs    omeprazole OTC (PRILOSEC OTC) 20 mg, Daily before breakfast     Pt denies SE/intolerances  Reviewed all medications by prescribing practitioner (such as prescriptions, OTCs, herbal therapies, supplements) and documented in the medical record.     Reviewed need for secondary prevention: Statins, ACE-I/ARB, Aspirin    Glucose Monitoring  Patient is wearing dexcom stelo.  Reviewed recent report with patient.    -----------------------------  Dexcom Clarity  -----------------------------  Beryl Rigo    YOB: 1950    Generated at: Mon, Jun 2, 2025 3:01 PM EDT    Reporting period: Tue May 20, 2025 - Mon Jun 2, 2025  -----------------------------  Glucose Details    Average glucose: 189 mg/dL    GMI: 7.8%    Standard deviation: 42 mg/dL    Coefficient of Variation: 22.0%  -----------------------------  Time in Range    Very High: 9%    High: 40%    In Range: 51%    Low: 0%    Very Low: 0%    Target Range   mg/dL    -----------------------------  Sensor usage    Days with data: 14/14    Time active: 96%    Avg. calibrations per day: 0.0      Last Labs/Vitals/Meds  HEMOGLOBIN A1c (% of total Hgb)   Date Value   03/18/2025 8.6 (H)     Hemoglobin A1C (%)   Date Value   06/03/2024 8.0 (H)   02/07/2024 7.8 (H)   10/03/2023 7.1 (H)   02/17/2023 6.9 (H)   08/16/2022 6.5 (H)   01/24/2022 7.1 (H)   07/30/2021 7.1 (H)   05/21/2021 7.0 (H)   11/21/2020 7.0 (H)     GLUCOSE (mg/dL)   Date Value   03/18/2025 171 (H)     Glucose (mg/dL)   Date Value   03/25/2025 158 (H)     Creatinine (mg/dL)   Date Value   03/25/2025 0.95     CREATININE (mg/dL)   Date Value    03/18/2025 0.81     EGFR (mL/min/1.73m2)   Date Value   03/18/2025 75     eGFR (mL/min/1.73m*2)   Date Value   03/25/2025 62       BP Readings from Last 3 Encounters:   05/28/25 136/88   03/25/25 109/87   03/23/25 124/73        Wt Readings from Last 6 Encounters:   05/28/25 74.8 kg (165 lb)   03/25/25 77.1 kg (170 lb)   03/23/25 78.9 kg (173 lb 15.1 oz)   03/20/25 79.4 kg (175 lb)   03/18/25 81.2 kg (179 lb)   02/03/25 81.2 kg (179 lb)     BMI Readings from Last 6 Encounters:   05/28/25 30.18 kg/m²   03/25/25 31.09 kg/m²   03/23/25 31.81 kg/m²   03/20/25 32.01 kg/m²   03/18/25 32.74 kg/m²   02/03/25 32.74 kg/m²       Assessment:  Patients diabetes is improved with most recent A1c of 8.6% (Goal < 7%).   Patient currently not taking any medication for diabetes.  She has tried/failed most classes of medications.    Discussed start low dose basal insulin.  Pt is agreeable.  Inject 10 units under the skin once daily.  Reviewed insulin instructions, demonstrated how to use pen.  Dial to 2 units and waste then dial to 10 units and inject into abdomen.  Answered all pt questions.  Instructed to call with any further concerns/questions.  Pt needs order for Dexcom G7 sent to [employer] for fulfillment     Plan:  1.  START Lantus 10 units once daily   2.  Continue all other medications at current dosages  3.  Follow up in 2 months with clinical pharmacist      Data reviewed and evaluated by SHERRELL V[facility]quez [facility], [facility]  Treatment and plan changes discussed with No ref. provider found

## 2025-06-07 ENCOUNTER — APPOINTMENT (OUTPATIENT)
Dept: RADIOLOGY | Facility: HOSPITAL | Age: 75
End: 2025-06-07
Payer: MEDICARE

## 2025-06-07 DIAGNOSIS — M54.12 CERVICAL RADICULOPATHY: ICD-10-CM

## 2025-06-07 PROCEDURE — 72141 MRI NECK SPINE W/O DYE: CPT | Performed by: RADIOLOGY

## 2025-06-07 PROCEDURE — 72141 MRI NECK SPINE W/O DYE: CPT

## 2025-06-09 ENCOUNTER — APPOINTMENT (OUTPATIENT)
Dept: PHYSICAL THERAPY | Facility: CLINIC | Age: 75
End: 2025-06-09
Payer: COMMERCIAL

## 2025-06-18 LAB
ALBUMIN SERPL-MCNC: 4.4 G/DL (ref 3.6–5.1)
ALP SERPL-CCNC: 79 U/L (ref 37–153)
ALT SERPL-CCNC: 8 U/L (ref 6–29)
ANION GAP SERPL CALCULATED.4IONS-SCNC: 10 MMOL/L (CALC) (ref 7–17)
AST SERPL-CCNC: 13 U/L (ref 10–35)
BILIRUB SERPL-MCNC: 0.5 MG/DL (ref 0.2–1.2)
BUN SERPL-MCNC: 17 MG/DL (ref 7–25)
CALCIUM SERPL-MCNC: 9.9 MG/DL (ref 8.6–10.4)
CHLORIDE SERPL-SCNC: 107 MMOL/L (ref 98–110)
CO2 SERPL-SCNC: 26 MMOL/L (ref 20–32)
CREAT SERPL-MCNC: 0.77 MG/DL (ref 0.6–1)
EGFRCR SERPLBLD CKD-EPI 2021: 79 ML/MIN/1.73M2
EST. AVERAGE GLUCOSE BLD GHB EST-MCNC: 157 MG/DL
EST. AVERAGE GLUCOSE BLD GHB EST-SCNC: 8.7 MMOL/L
GLUCOSE SERPL-MCNC: 122 MG/DL (ref 65–99)
HBA1C MFR BLD: 7.1 %
POTASSIUM SERPL-SCNC: 4.4 MMOL/L (ref 3.5–5.3)
PROT SERPL-MCNC: 6.4 G/DL (ref 6.1–8.1)
SODIUM SERPL-SCNC: 143 MMOL/L (ref 135–146)

## 2025-06-20 ENCOUNTER — APPOINTMENT (OUTPATIENT)
Dept: PRIMARY CARE | Facility: CLINIC | Age: 75
End: 2025-06-20
Payer: MEDICARE

## 2025-06-20 ENCOUNTER — TELEPHONE (OUTPATIENT)
Dept: PRIMARY CARE | Facility: CLINIC | Age: 75
End: 2025-06-20

## 2025-06-20 VITALS
WEIGHT: 165 LBS | SYSTOLIC BLOOD PRESSURE: 136 MMHG | HEIGHT: 62 IN | DIASTOLIC BLOOD PRESSURE: 76 MMHG | HEART RATE: 83 BPM | TEMPERATURE: 98.2 F | OXYGEN SATURATION: 99 % | BODY MASS INDEX: 30.36 KG/M2

## 2025-06-20 DIAGNOSIS — K21.9 GASTROESOPHAGEAL REFLUX DISEASE WITHOUT ESOPHAGITIS: ICD-10-CM

## 2025-06-20 DIAGNOSIS — I10 PRIMARY HYPERTENSION: ICD-10-CM

## 2025-06-20 DIAGNOSIS — E11.9 TYPE 2 DIABETES MELLITUS WITHOUT COMPLICATION, WITHOUT LONG-TERM CURRENT USE OF INSULIN: Primary | ICD-10-CM

## 2025-06-20 DIAGNOSIS — R42 VERTIGO: ICD-10-CM

## 2025-06-20 DIAGNOSIS — E11.9 TYPE 2 DIABETES MELLITUS WITHOUT COMPLICATION, WITHOUT LONG-TERM CURRENT USE OF INSULIN: ICD-10-CM

## 2025-06-20 LAB
POC APPEARANCE, URINE: CLEAR
POC BILIRUBIN, URINE: NEGATIVE
POC BLOOD, URINE: ABNORMAL
POC COLOR, URINE: YELLOW
POC GLUCOSE, URINE: ABNORMAL MG/DL
POC KETONES, URINE: NEGATIVE MG/DL
POC LEUKOCYTES, URINE: ABNORMAL
POC NITRITE,URINE: NEGATIVE
POC PH, URINE: 5 PH
POC PROTEIN, URINE: NEGATIVE MG/DL
POC SPECIFIC GRAVITY, URINE: >=1.03
POC UROBILINOGEN, URINE: 0.2 EU/DL

## 2025-06-20 PROCEDURE — 81003 URINALYSIS AUTO W/O SCOPE: CPT | Performed by: STUDENT IN AN ORGANIZED HEALTH CARE EDUCATION/TRAINING PROGRAM

## 2025-06-20 PROCEDURE — 3078F DIAST BP <80 MM HG: CPT | Performed by: STUDENT IN AN ORGANIZED HEALTH CARE EDUCATION/TRAINING PROGRAM

## 2025-06-20 PROCEDURE — 1036F TOBACCO NON-USER: CPT | Performed by: STUDENT IN AN ORGANIZED HEALTH CARE EDUCATION/TRAINING PROGRAM

## 2025-06-20 PROCEDURE — G2211 COMPLEX E/M VISIT ADD ON: HCPCS | Performed by: STUDENT IN AN ORGANIZED HEALTH CARE EDUCATION/TRAINING PROGRAM

## 2025-06-20 PROCEDURE — 1159F MED LIST DOCD IN RCRD: CPT | Performed by: STUDENT IN AN ORGANIZED HEALTH CARE EDUCATION/TRAINING PROGRAM

## 2025-06-20 PROCEDURE — 99214 OFFICE O/P EST MOD 30 MIN: CPT | Performed by: STUDENT IN AN ORGANIZED HEALTH CARE EDUCATION/TRAINING PROGRAM

## 2025-06-20 PROCEDURE — 1125F AMNT PAIN NOTED PAIN PRSNT: CPT | Performed by: STUDENT IN AN ORGANIZED HEALTH CARE EDUCATION/TRAINING PROGRAM

## 2025-06-20 PROCEDURE — 3075F SYST BP GE 130 - 139MM HG: CPT | Performed by: STUDENT IN AN ORGANIZED HEALTH CARE EDUCATION/TRAINING PROGRAM

## 2025-06-20 RX ORDER — MECLIZINE HYDROCHLORIDE 25 MG/1
25 TABLET ORAL 3 TIMES DAILY PRN
Qty: 30 TABLET | Refills: 0 | Status: SHIPPED | OUTPATIENT
Start: 2025-06-20

## 2025-06-20 RX ORDER — OMEPRAZOLE 20 MG/1
20 TABLET, DELAYED RELEASE ORAL
Qty: 90 TABLET | Refills: 1 | Status: SHIPPED | OUTPATIENT
Start: 2025-06-20

## 2025-06-20 ASSESSMENT — PAIN SCALES - GENERAL: PAINLEVEL_OUTOF10: 2

## 2025-06-20 NOTE — PROGRESS NOTES
Subjective   Beryl Sierra is a 75 y.o. female who presents for Diabetes Follow up    Saw Dr. Sanchez for shoulder issues and was then referred to Dr. Thorpe for cervical radiculopathy, may be getting injections     CHRONIC CONDITIONS:  Anxiety - no meds  DM  HTN - follows with Dr. Buck for med management d/t multiple allergies/intolerances. Home readings 130-low 140's/70's  HLD - no meds  GERD - omeprazole 20mg PRN      #T2DM  Current medication regimen:  Lantus 10 units nightly  - previously self discontinued Metformin 500mg BID due to side effects, felt it was making her skin break out. Recently when saw her labs she restarted metformin for short period and then had skin outbreak and so stopped again  Blood sugars at home averaging:  - Fasting and non-fastin-155  -postprandial 150-180  CGM? yes  Hypoglycemic episodes no  Last A1C :  7.1, 8.6, 8.0, 7.8, 7.1, 6.9, 6.5, 7.1  Last eGFR - 79, 75, 66, 59, 67, 67, 77, 68, 65  Last Creatinine - 0.77, 0.81, 0.9, 1.0, 0.9, 0.9, 0.8, 0.9, 0.9  Last microalbumin - <12 2024  Up to date on yearly eye exams yes under Dr. Hall  Pneumococcal - no, declined previously     #HTN  136/76  Home readings - 130-140's/80's  Meds - NONE  Sees Dr. Buck in cardiology and they have decided to hold off on reinitiating medication due to multiple medication side effects    LAB REVIEW   HEMOGLOBIN A1c   Date Value   2025 7.1 % (H)   2025 8.6 % of total Hgb (H)     Hemoglobin A1C (%)   Date Value   2024 8.0 (H)   2024 7.8 (H)   10/03/2023 7.1 (H)     GLUCOSE (mg/dL)   Date Value   2025 122 (H)   2025 171 (H)     Glucose (mg/dL)   Date Value   2025 158 (H)   2025 211 (H)   2024 218 (H)     Creatinine (mg/dL)   Date Value   2025 0.95   2025 0.82   2024 0.85     CREATININE (mg/dL)   Date Value   2025 0.77   2025 0.81     EGFR (mL/min/1.73m2)   Date Value   2025 79   2025 75     eGFR  "(mL/min/1.73m*2)   Date Value   03/25/2025 62   03/23/2025 74   11/24/2024 71     Lab Results   Component Value Date    ALBUR 12 02/17/2023    CREATININE 0.77 06/17/2025     Lab Results   Component Value Date    CHOL 218 (H) 03/18/2025    CHOL 222 (H) 10/03/2023    CHOL 206 (H) 08/16/2022     Lab Results   Component Value Date    HDL 58 03/18/2025    HDL 73.0 10/03/2023    HDL 69 08/16/2022     Lab Results   Component Value Date    LDLCALC 131 (H) 03/18/2025    LDLCALC 121 10/03/2023    LDLCALC 113 08/16/2022     Lab Results   Component Value Date    TRIG 157 (H) 03/18/2025    TRIG 141 10/03/2023    TRIG 121 08/16/2022       ROS otherwise negative aside from what was mentioned above in HPI.      Objective   /76 (BP Location: Left arm, Patient Position: Sitting, BP Cuff Size: Adult)   Pulse 83   Temp 36.8 °C (98.2 °F) (Temporal)   Ht 1.575 m (5' 2\")   Wt 74.8 kg (165 lb)   SpO2 99%   BMI 30.18 kg/m²     Physical Exam  Vitals and nursing note reviewed.   Constitutional:       General: She is not in acute distress.     Appearance: She is not ill-appearing.   Cardiovascular:      Rate and Rhythm: Normal rate and regular rhythm.      Heart sounds: Normal heart sounds. No murmur heard.  Pulmonary:      Effort: Pulmonary effort is normal. No respiratory distress.      Breath sounds: Normal breath sounds. No wheezing, rhonchi or rales.   Musculoskeletal:      Right lower leg: No edema.      Left lower leg: No edema.         Assessment & Plan  Type 2 diabetes mellitus without complication, without long-term current use of insulin  Improved control with addition of Lantus. Continue 10 units nightly.  Routine diabetic retinopathy screening up to date   Continue dietary efforts and physical activity  Follow up with Stella V[facility] next month as scheduled, see me in 3 mos  Orders:    POCT UA Automated manually resulted    Albumin-Creatinine Ratio, Urine Random    Vertigo  Refill meclizine at pt request  Orders:    " meclizine (Antivert) 25 mg tablet; Take 1 tablet (25 mg) by mouth 3 times a day as needed for dizziness.    Gastroesophageal reflux disease without esophagitis  Refill omeprazole at pt request. Continue GERD precautions  Orders:    omeprazole OTC (PriLOSEC OTC) 20 mg EC tablet; Take 1 tablet (20 mg) by mouth once daily in the morning. Take before meals. Do not crush, chew, or split.

## 2025-06-20 NOTE — ASSESSMENT & PLAN NOTE
Improved control with addition of Lantus. Continue 10 units nightly.  Routine diabetic retinopathy screening up to date   Continue dietary efforts and physical activity  Follow up with Stella Vásquez [facility] next month as scheduled, see me in 3 mos  Orders:    POCT UA Automated manually resulted    Albumin-Creatinine Ratio, Urine Random

## 2025-06-23 LAB
ALBUMIN/CREAT UR: 3 MG/G CREAT
CREAT UR-MCNC: 135 MG/DL (ref 20–275)
MICROALBUMIN UR-MCNC: 0.4 MG/DL

## 2025-06-25 ENCOUNTER — OFFICE VISIT (OUTPATIENT)
Dept: PAIN MEDICINE | Facility: CLINIC | Age: 75
End: 2025-06-25
Payer: MEDICARE

## 2025-06-25 ENCOUNTER — TELEPHONE (OUTPATIENT)
Dept: PRIMARY CARE | Facility: CLINIC | Age: 75
End: 2025-06-25

## 2025-06-25 ENCOUNTER — PREP FOR PROCEDURE (OUTPATIENT)
Dept: PAIN MEDICINE | Facility: CLINIC | Age: 75
End: 2025-06-25
Payer: MEDICARE

## 2025-06-25 VITALS
HEART RATE: 88 BPM | SYSTOLIC BLOOD PRESSURE: 142 MMHG | RESPIRATION RATE: 22 BRPM | DIASTOLIC BLOOD PRESSURE: 80 MMHG | HEIGHT: 62 IN | WEIGHT: 165 LBS | OXYGEN SATURATION: 96 % | BODY MASS INDEX: 30.36 KG/M2

## 2025-06-25 DIAGNOSIS — M54.12 CERVICAL RADICULOPATHY: Primary | ICD-10-CM

## 2025-06-25 PROCEDURE — 99214 OFFICE O/P EST MOD 30 MIN: CPT | Performed by: ANESTHESIOLOGY

## 2025-06-25 PROCEDURE — 1159F MED LIST DOCD IN RCRD: CPT | Performed by: ANESTHESIOLOGY

## 2025-06-25 PROCEDURE — 1125F AMNT PAIN NOTED PAIN PRSNT: CPT | Performed by: ANESTHESIOLOGY

## 2025-06-25 PROCEDURE — G2211 COMPLEX E/M VISIT ADD ON: HCPCS | Performed by: ANESTHESIOLOGY

## 2025-06-25 PROCEDURE — 3079F DIAST BP 80-89 MM HG: CPT | Performed by: ANESTHESIOLOGY

## 2025-06-25 PROCEDURE — 1036F TOBACCO NON-USER: CPT | Performed by: ANESTHESIOLOGY

## 2025-06-25 PROCEDURE — 3077F SYST BP >= 140 MM HG: CPT | Performed by: ANESTHESIOLOGY

## 2025-06-25 ASSESSMENT — PAIN DESCRIPTION - DESCRIPTORS: DESCRIPTORS: ACHING;BURNING

## 2025-06-25 ASSESSMENT — PAIN SCALES - GENERAL
PAINLEVEL_OUTOF10: 7
PAINLEVEL_OUTOF10: 7

## 2025-06-25 ASSESSMENT — PATIENT HEALTH QUESTIONNAIRE - PHQ9
2. FEELING DOWN, DEPRESSED OR HOPELESS: NOT AT ALL
SUM OF ALL RESPONSES TO PHQ9 QUESTIONS 1 & 2: 0
1. LITTLE INTEREST OR PLEASURE IN DOING THINGS: NOT AT ALL

## 2025-06-25 ASSESSMENT — ENCOUNTER SYMPTOMS
NECK STIFFNESS: 1
ACTIVITY CHANGE: 1
NECK PAIN: 1

## 2025-06-25 ASSESSMENT — PAIN - FUNCTIONAL ASSESSMENT: PAIN_FUNCTIONAL_ASSESSMENT: 0-10

## 2025-06-25 NOTE — TELEPHONE ENCOUNTER
Emil needs notes to support the change in insulin , 10 units daily, fax to [facility] [phone].      Phone number [phone] x [phone]
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
## 2025-06-27 ENCOUNTER — OFFICE VISIT (OUTPATIENT)
Dept: ORTHOPEDIC SURGERY | Facility: CLINIC | Age: 75
End: 2025-06-27
Payer: MEDICARE

## 2025-06-27 DIAGNOSIS — S16.1XXA CERVICAL STRAIN, ACUTE, INITIAL ENCOUNTER: ICD-10-CM

## 2025-06-27 DIAGNOSIS — M48.02 CERVICAL SPINAL STENOSIS: Primary | ICD-10-CM

## 2025-06-27 PROCEDURE — 99203 OFFICE O/P NEW LOW 30 MIN: CPT | Performed by: ORTHOPAEDIC SURGERY
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
## 2025-07-17 ENCOUNTER — ANCILLARY PROCEDURE (OUTPATIENT)
Dept: RADIOLOGY | Facility: EXTERNAL LOCATION | Age: 75
End: 2025-07-17
Payer: MEDICARE

## 2025-07-17 DIAGNOSIS — M54.12 RADICULOPATHY, CERVICAL REGION: ICD-10-CM

## 2025-07-17 PROCEDURE — 62321 NJX INTERLAMINAR CRV/THRC: CPT | Performed by: ANESTHESIOLOGY

## 2025-07-17 NOTE — PROGRESS NOTES
Pre and postprocedure diagnosis--cervical radiculopathy    Procedure--cervical epidural steroid injection C7-T1    Anesthesia--local    Complications--none    Clinical note--the patient has a history of pain.  I explained the risks, benefits, and alternatives of the procedure to the patient.  The patient wishes to proceed.    Procedure Note--The patient was brought to the procedure room and placed in the prone position.  Sterile prep and drape with ChloraPrep and sterile towels.  6 mL of half percent lidocaine were injected through a 25-gauge needle for local anesthesia.  An 18-gauge Tuohy needle was guided to the interlaminar epidural space at the C7-T1 level by the loss-of-resistance technique under fluoroscopic guidance.  After negative aspiration, 1 mL of contrast was injected through the needle to ensure proper needle placement.  Then 2 mL of half percent lidocaine and 60 mg of triamcinolone were injected through the needle.  The needle was removed and the patient was transferred to recovery.

## 2025-07-23 ENCOUNTER — APPOINTMENT (OUTPATIENT)
Dept: PRIMARY CARE | Facility: CLINIC | Age: 75
End: 2025-07-23
Payer: MEDICARE

## 2025-07-23 VITALS — WEIGHT: 164.5 LBS | BODY MASS INDEX: 30.27 KG/M2 | HEIGHT: 62 IN

## 2025-07-23 DIAGNOSIS — E11.9 TYPE 2 DIABETES MELLITUS WITHOUT COMPLICATION, WITHOUT LONG-TERM CURRENT USE OF INSULIN: Primary | ICD-10-CM

## 2025-07-23 NOTE — PROGRESS NOTES
DM FOLLOW UP  E11.9 - Type 2 diabetes    Beryl Sierra is a 75 y.o. female here today at the request of Referring Provider: Rox Govea MD for my opinion regarding diabetes management.  My final recommendations will be communicated back to the requesting provider by way of shared medical record.     Pt here today for follow up re: T2DM.  Started Lantus 10 units daily at last OV.          Patient does not qualify for VAF    Subjective   Past Medical History:  She has a past medical history of Abdominal pain (06/27/2022), Abnormal urinalysis (11/24/2020), Accidental fall (06/11/2024), Acute urinary tract infection (03/30/2021), Allergic, Anxiety, Arthritis, Cervical vertebral fusion (10/15/2020), Chest wall pain (06/11/2024), Cholelithiasis and cholecystitis without obstruction (09/04/2023), Contact with and (suspected) exposure to covid-19 (04/28/2022), Contusion of chest (06/11/2024), Diabetes mellitus (Multi) (Unknown), Dysuria (05/07/2018), Gall stone (11/27/2019), GERD (gastroesophageal reflux disease), Hypertension (Unknown), Injury of head (06/11/2024), Nausea (06/27/2022), Pain of foot (07/06/2020), and Urinary tract infection.    She has no past medical history of Personal history of irradiation.    Social History:  She reports that she has never smoked. She has never used smokeless tobacco. She reports that she does not drink alcohol and does not use drugs.    Allergies:  Penicillin; Vitamins a,c,e-zinc-copper; Aluminum aspirin; Amlodipine; Aspirin; Atenolol; Cefaclor; Cefuroxime axetil; Cephalexin; Codeine; Covid-19 vaccine, mrna, [id number], lnp-s (pfizer); Doxazosin; Doxycycline; Erythromycin; Escitalopram oxalate; Ezetimibe; Latex; Levofloxacin; Lisinopril; Metformin; Metoprolol; Metronidazole; Hrhxw-cypvm-lxtrkbf-pramoxine; Nitrofurantoin monohyd/m-cryst; Other; Simvastatin; Streptomycin; Sulfamethoxazole-trimethoprim; Tetracycline; Trimethoprim; Tropicamide; Vancomycin; Zolpidem tartrate; and  "Hydrochlorothiazide    CURRENT PHARMACOTHERAPY   Current Outpatient Medications   Medication Instructions    acetaminophen (TYLENOL ARTHRITIS PAIN) 1,300 mg, 2 times daily    blood-glucose sensor (Dexcom G7 Sensor) device Change sensor every 10 days as directed    insulin glargine (Lantus Solostar U-100 Insulin) 100 unit/mL (3 mL) pen Inject 10 units under the skin once daily or as directed    meclizine (ANTIVERT) 25 mg, oral, 3 times daily PRN    naproxen sodium (Aleve) 220 mg tablet 1 tablet with food or milk as needed Orally every 12 hrs    omeprazole OTC (PRILOSEC OTC) 20 mg, oral, Daily before breakfast, Do not crush, chew, or split.    pen needle, diabetic 32 gauge x 5/32\" needle Use daily with insulin injection.     Pt denies SE/intolerances  Reviewed all medications by prescribing practitioner (such as prescriptions, OTCs, herbal therapies, supplements) and documented in the medical record.     Reviewed need for secondary prevention: Statins, ACE-I/ARB, Aspirin      Glucose Monitoring  Patient is wearing Dexcom Stelo.  Report reviewed with patient.    -----------------------------  Dexcom Clarity  -----------------------------  Beryl Rigo    YOB: 1950    Generated at: Thu, Jul 24, 2025 3:57 PM EDT    Reporting period: Fri Jul 11, 2025 - Thu Jul 24, 2025  -----------------------------  Glucose Details    Average glucose: 196 mg/dL    GMI: 8.0%    Standard deviation: 59 mg/dL    Coefficient of Variation: 30.1%  -----------------------------  Time in Range    Very High: 19%    High: 39%    In Range: 42%    Low: 0%    Very Low: 0%    Target Range   mg/dL    -----------------------------  Sensor usage    Days with data: 14/14    Time active: 97%    Avg. calibrations per day: 0.0      Lifestyle:  Current diet: improving, trying to eat smaller portions and improving, trying to limit CHO foods  Current exercise: 30 minutes 1-2 days per week    Last Labs/Vitals/Meds  HEMOGLOBIN A1c   Date " Value   06/17/2025 7.1 % (H)   03/18/2025 8.6 % of total Hgb (H)     Hemoglobin A1C (%)   Date Value   06/03/2024 8.0 (H)   02/07/2024 7.8 (H)   10/03/2023 7.1 (H)   02/17/2023 6.9 (H)   08/16/2022 6.5 (H)   01/24/2022 7.1 (H)   07/30/2021 7.1 (H)   05/21/2021 7.0 (H)   11/21/2020 7.0 (H)     GLUCOSE (mg/dL)   Date Value   06/17/2025 122 (H)     CREATININE (mg/dL)   Date Value   06/17/2025 0.77     EGFR (mL/min/1.73m2)   Date Value   06/17/2025 79       BP Readings from Last 3 Encounters:   06/25/25 142/80   06/20/25 136/76   05/28/25 136/88        Wt Readings from Last 6 Encounters:   07/23/25 74.6 kg (164 lb 8 oz)   06/25/25 74.8 kg (165 lb)   06/20/25 74.8 kg (165 lb)   05/28/25 74.8 kg (165 lb)   06/07/25 75.3 kg (166 lb)   03/25/25 77.1 kg (170 lb)     BMI Readings from Last 6 Encounters:   07/23/25 30.08 kg/m²   06/25/25 30.18 kg/m²   06/20/25 30.18 kg/m²   05/28/25 30.18 kg/m²   06/07/25 30.36 kg/m²   03/25/25 31.09 kg/m²       Assessment:  Patients diabetes is improved with most recent A1c of 7.1% (Goal < 7%).  Was 8.6% 3 months ago  Compliance at present is estimated to be good  Discussed dose, basal insulin.  Pt tolerating.  No hypoglycemia on current CGM report.  Keep current dose, Lantus 10 units daily.  Pt needs appeal for Dexcom sensors via insurance.        Plan:  1.  Continue all medications at current dosages  2.  Check Dexcom ben often - when you wake up, before/after meals, bedtime, etc. Follow CGM closely, learning from glucose readings which meals/snacks cause higher blood sugars. Call Dexcom Support at [phone] for any problems with sensor readings or adhesion.  3.  Follow up in 3 months with clinical pharmacist       Data reviewed and evaluated by SHERRELL Vásquez RP, [facility]  Treatment and plan changes discussed with Rox Govea MD

## 2025-07-25 ENCOUNTER — TELEMEDICINE (OUTPATIENT)
Dept: PHARMACY | Facility: HOSPITAL | Age: 75
End: 2025-07-25
Payer: MEDICARE

## 2025-07-25 DIAGNOSIS — E11.9 TYPE 2 DIABETES MELLITUS WITHOUT COMPLICATION, WITHOUT LONG-TERM CURRENT USE OF INSULIN: ICD-10-CM

## 2025-07-27 RX ORDER — INSULIN GLARGINE 100 [IU]/ML
INJECTION, SOLUTION SUBCUTANEOUS
Qty: 15 ML | Refills: 11 | Status: SHIPPED | OUTPATIENT
Start: 2025-07-27

## 2025-07-27 NOTE — PROGRESS NOTES
MEDICATION MANAGEMENT    Beryl Sierra is a 75 y.o. female who was referred by Rox Govea MD to complete medication reconciliation and review with the clinical pharmacist.  A comprehensive medication review was completed with the patient via telephone today.  With patient's permission, this is a Telemedicine visit with audio. Provider located at office address. Patient located at their home address. All issues as documented below were discussed and addressed but limited physical exam was performed. If it was felt that the patient should be evaluated via face-to-face office appointment(s) they were directed to appropriate location.  Patient reports financial barrier with current medication.      MEDICATION HISTORY  Allergies[1]  Medications Ordered Prior to Encounter[2]     Patient Assistance Screening (VAF)  Patient verbally reports monthly or yearly income which is less than 400% federal poverty level.  Application for program has been submitted for the following medications:     Lantus     Patient has been informed that program team will be reaching out to them to discuss necessary documentation, instructed to answer phone/return voicemail.   Patient aware this process may take up to 6 weeks.   If approved medication must [facility] through [facility] and may be picked up or mailed to patient.       LABS  There were no vitals taken for this visit.   Lab Results   Component Value Date    HGBA1C 7.1 (H) 06/17/2025    HGBA1C 8.6 (H) 03/18/2025    HGBA1C 8.0 (H) 06/03/2024     Lab Results   Component Value Date    BILITOT 0.5 06/17/2025    CALCIUM 9.9 06/17/2025    CO2 26 06/17/2025     06/17/2025    CREATININE 0.77 06/17/2025    GLUCOSE 122 (H) 06/17/2025    ALKPHOS 79 06/17/2025    K 4.4 06/17/2025    PROT 6.4 06/17/2025     06/17/2025    AST 13 06/17/2025    ALT 8 06/17/2025    BUN 17 06/17/2025    ANIONGAP 10 06/17/2025    MG 1.75 03/25/2025    ALBUMIN 4.4 06/17/2025    LIPASE 9  03/23/2025     Lab Results   Component Value Date    TRIG 157 (H) 03/18/2025    CHOL 218 (H) 03/18/2025    LDLCALC 131 (H) 03/18/2025    HDL 58 03/18/2025         Assessment/Plan    Comments/Recommendations to PCP:  Reconciled medications with patient via telephone today.  Reviewed instructions, MOA, SE and dose for Lantus.  Reviewed directions and hypoglycemia treatment with patient.  Patient verbalizes understanding regarding plan of care and all questions answered   4.   Pt will follow up with PCP as scheduled          [facility]    Verbal consent to manage patient's drug therapy was obtained from the patient and/or an individual authorized to act on behalf of a patient. They were informed they may decline to participate or withdraw from participation in pharmacy services at any time.       [1]   Allergies  Allergen Reactions    Penicillin Other     STOPS HEART (other)    Vitamins A,C,E-Zinc-Copper Other     Bladder spasms    Aluminum Aspirin Unknown    Amlodipine Dizziness    Aspirin Unknown    Atenolol Dizziness    Cefaclor Unknown    Cefuroxime Axetil Unknown    Cephalexin Unknown    Codeine Unknown    Covid-19 Vaccine, Mrna, [id number], Lnp-S (Pfizer) Unknown    Doxazosin Unknown    Doxycycline Unknown    Erythromycin Unknown    Escitalopram Oxalate Unknown    Ezetimibe Unknown    Latex Unknown    Levofloxacin Unknown    Lisinopril Unknown    Metformin Unknown    Metoprolol Unknown    Metronidazole Unknown    [place]kown-Jeabl-[place]spwi-Pramoxine Unknown    Nitrofurantoin Monohyd/M-Cryst Unknown    Other Unknown     Vitamins    Simvastatin Unknown    Streptomycin Unknown    Sulfamethoxazole-Trimethoprim Unknown    Tetracycline Unknown    Trimethoprim Unknown    Tropicamide Unknown    Vancomycin Unknown    Zolpidem Tartrate Unknown    Hydrochlorothiazide Rash   [2]   Current Outpatient Medications on File Prior to Visit   Medication Sig Dispense Refill    acetaminophen (Tylenol Arthritis Pain) 650 mg ER  "tablet Take 2 tablets (1,300 mg) by mouth 2 times a day.      blood-glucose sensor (Dexcom G7 Sensor) device Change sensor every 10 days as directed 9 each 3    meclizine (Antivert) 25 mg tablet Take 1 tablet (25 mg) by mouth 3 times a day as needed for dizziness. 30 tablet 0    naproxen sodium (Aleve) 220 mg tablet 1 tablet with food or milk as needed Orally every 12 hrs      omeprazole OTC (PriLOSEC OTC) 20 mg EC tablet Take 1 tablet (20 mg) by mouth once daily in the morning. Take before meals. Do not crush, chew, or split. 90 tablet 1    pen needle, diabetic 32 gauge x 5/32\" needle Use daily with insulin injection. 100 each 2    [DISCONTINUED] insulin glargine (Lantus Solostar U-100 Insulin) 100 unit/mL (3 mL) pen Inject 10 units under the skin once daily or as directed 15 mL 11     No current facility-administered medications on file prior to visit.     "

## 2025-07-27 NOTE — PROGRESS NOTES
Please review for internal [employer] (VAF) eligibility. Prescriptions have been sent to [facility].     Beryl Sierra  1950   [id number]  [phone]   [email or web address]  Delivery Preference (if known): MAIL  Priority:  Hold Medication until [facility] approved/denied  Filing Status: Patient does file taxes  Household size: 2  Financial Documents To Be Collected By: Documents attached to email  Medication(s):    Lantus    *I have confirmed the above medications are listed on the current VAF formulary: [email or web address]    I acknowledge the [facility] staff will further reach out to the patient to confirm additional details as needed, including but not limited to collecting financial documentation, confirming delivery information, obtaining payment method for non-VAF medications.      Stella Vásquez, [facility]

## 2025-08-24 ENCOUNTER — HOSPITAL ENCOUNTER (EMERGENCY)
Facility: HOSPITAL | Age: 75
Discharge: HOME | End: 2025-08-24
Attending: STUDENT IN AN ORGANIZED HEALTH CARE EDUCATION/TRAINING PROGRAM
Payer: MEDICARE

## 2025-08-24 VITALS
TEMPERATURE: 97.9 F | BODY MASS INDEX: 28.71 KG/M2 | RESPIRATION RATE: 17 BRPM | SYSTOLIC BLOOD PRESSURE: 179 MMHG | OXYGEN SATURATION: 99 % | HEART RATE: 83 BPM | WEIGHT: 157 LBS | DIASTOLIC BLOOD PRESSURE: 95 MMHG

## 2025-08-24 DIAGNOSIS — R30.0 DYSURIA: ICD-10-CM

## 2025-08-24 DIAGNOSIS — R35.0 URINARY FREQUENCY: Primary | ICD-10-CM

## 2025-08-24 DIAGNOSIS — N39.0 URINARY TRACT INFECTION WITHOUT HEMATURIA, SITE UNSPECIFIED: ICD-10-CM

## 2025-08-24 LAB
APPEARANCE UR: ABNORMAL
BACTERIA #/AREA URNS AUTO: ABNORMAL /HPF
BILIRUB UR STRIP.AUTO-MCNC: NEGATIVE MG/DL
COLOR UR: ABNORMAL
GLUCOSE UR STRIP.AUTO-MCNC: ABNORMAL MG/DL
KETONES UR STRIP.AUTO-MCNC: NEGATIVE MG/DL
LEUKOCYTE ESTERASE UR QL STRIP.AUTO: ABNORMAL
MUCOUS THREADS #/AREA URNS AUTO: ABNORMAL /LPF
NITRITE UR QL STRIP.AUTO: NEGATIVE
PH UR STRIP.AUTO: 5 [PH]
PROT UR STRIP.AUTO-MCNC: NEGATIVE MG/DL
RBC # UR STRIP.AUTO: NEGATIVE MG/DL
RBC #/AREA URNS AUTO: ABNORMAL /HPF
SP GR UR STRIP.AUTO: 1.02
SQUAMOUS #/AREA URNS AUTO: ABNORMAL /HPF
UROBILINOGEN UR STRIP.AUTO-MCNC: NORMAL MG/DL
WBC #/AREA URNS AUTO: ABNORMAL /HPF

## 2025-08-24 PROCEDURE — 87086 URINE CULTURE/COLONY COUNT: CPT | Mod: TRILAB | Performed by: STUDENT IN AN ORGANIZED HEALTH CARE EDUCATION/TRAINING PROGRAM

## 2025-08-24 PROCEDURE — 2500000001 HC RX 250 WO HCPCS SELF ADMINISTERED DRUGS (ALT 637 FOR MEDICARE OP): Performed by: STUDENT IN AN ORGANIZED HEALTH CARE EDUCATION/TRAINING PROGRAM

## 2025-08-24 PROCEDURE — 99283 EMERGENCY DEPT VISIT LOW MDM: CPT | Performed by: STUDENT IN AN ORGANIZED HEALTH CARE EDUCATION/TRAINING PROGRAM

## 2025-08-24 PROCEDURE — 81001 URINALYSIS AUTO W/SCOPE: CPT | Performed by: STUDENT IN AN ORGANIZED HEALTH CARE EDUCATION/TRAINING PROGRAM

## 2025-08-24 RX ORDER — CIPROFLOXACIN 250 MG/1
250 TABLET, FILM COATED ORAL EVERY 12 HOURS
Qty: 10 TABLET | Refills: 0 | Status: SHIPPED | OUTPATIENT
Start: 2025-08-24 | End: 2025-08-29

## 2025-08-24 RX ORDER — CIPROFLOXACIN 500 MG/1
250 TABLET, FILM COATED ORAL ONCE
Status: COMPLETED | OUTPATIENT
Start: 2025-08-24 | End: 2025-08-24

## 2025-08-24 RX ADMIN — CIPROFLOXACIN 250 MG: 500 TABLET, FILM COATED ORAL at 08:45

## 2025-08-24 ASSESSMENT — PAIN SCALES - GENERAL: PAINLEVEL_OUTOF10: 0 - NO PAIN

## 2025-08-24 ASSESSMENT — PAIN - FUNCTIONAL ASSESSMENT: PAIN_FUNCTIONAL_ASSESSMENT: 0-10

## 2025-08-25 LAB — BACTERIA UR CULT: NORMAL

## 2025-08-26 DIAGNOSIS — E11.9 TYPE 2 DIABETES MELLITUS WITHOUT COMPLICATION, WITHOUT LONG-TERM CURRENT USE OF INSULIN: ICD-10-CM

## 2025-08-26 DIAGNOSIS — I10 PRIMARY HYPERTENSION: ICD-10-CM

## 2025-09-26 ENCOUNTER — APPOINTMENT (OUTPATIENT)
Dept: PRIMARY CARE | Facility: CLINIC | Age: 75
End: 2025-09-26
Payer: MEDICARE

## 2025-12-22 ENCOUNTER — APPOINTMENT (OUTPATIENT)
Dept: PRIMARY CARE | Facility: CLINIC | Age: 75
End: 2025-12-22
Payer: MEDICARE

## 2026-03-20 ENCOUNTER — APPOINTMENT (OUTPATIENT)
Dept: PRIMARY CARE | Facility: CLINIC | Age: 76
End: 2026-03-20
Payer: MEDICARE